# Patient Record
Sex: FEMALE | Race: BLACK OR AFRICAN AMERICAN | Employment: OTHER | URBAN - METROPOLITAN AREA
[De-identification: names, ages, dates, MRNs, and addresses within clinical notes are randomized per-mention and may not be internally consistent; named-entity substitution may affect disease eponyms.]

---

## 2017-01-08 ENCOUNTER — HOSPITAL ENCOUNTER (EMERGENCY)
Age: 72
Discharge: HOME | End: 2017-01-08
Attending: EMERGENCY MEDICINE
Payer: MEDICARE

## 2017-01-08 VITALS
WEIGHT: 209 LBS | SYSTOLIC BLOOD PRESSURE: 196 MMHG | HEART RATE: 77 BPM | DIASTOLIC BLOOD PRESSURE: 86 MMHG | BODY MASS INDEX: 32.8 KG/M2 | OXYGEN SATURATION: 100 % | HEIGHT: 67 IN | RESPIRATION RATE: 16 BRPM | TEMPERATURE: 98.7 F
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
## 2017-01-08 PROCEDURE — 99282 EMERGENCY DEPT VISIT SF MDM: CPT

## 2017-01-08 RX ORDER — TRIAMCINOLONE ACETONIDE 1 MG/ML
LOTION TOPICAL 3 TIMES DAILY
Qty: 60 ML | Refills: 0 | Status: SHIPPED | OUTPATIENT
Start: 2017-01-08 | End: 2017-01-24

## 2017-01-08 RX ORDER — PERMETHRIN 50 MG/G
CREAM TOPICAL
Qty: 60 G | Refills: 0 | Status: SHIPPED | OUTPATIENT
Start: 2017-01-08 | End: 2017-01-24 | Stop reason: ALTCHOICE

## 2017-01-08 NOTE — ED NOTES
Emergency Department Nursing Plan of Care       The Nursing Plan of Care is developed from the Nursing assessment and Emergency Department Attending provider initial evaluation. The plan of care may be reviewed in the ED Provider note. The Plan of Care was developed with the following considerations:   Patient / Family readiness to learn indicated by:verbalized understanding  Persons(s) to be included in education: patient and family  Barriers to Learning/Limitations:No    Signed     [facility]    1/8/2017   4:10 PM    Patient presents with potential insect bites to right posterior wrist and right breast since Saturday. There is redness and swelling to wrist with pain radiating up arm. Redness noted to right breast with itching. Patient states, \"it's just itching. \" States she thinks it was a spider but did not see the insect. Patient is alert and oriented x 4 and in no acute distress at this time. Respirations are at a regular rate, depth, and pattern. Patient updated on plan of care and has no questions or concerns at this time.

## 2017-01-08 NOTE — ED NOTES
Discharge instructions were given to the patient by KAVON Jefferson, Registered Nurse. .     The patient left the Emergency Department ambulatory, alert and oriented and in no acute distress with 2 prescription(s). The patient was encouraged to call or return to the ED for worsening symptoms or problems and was encouraged to schedule a follow up appointment for continuing care. Patient leaving ED accompanied by friend. The patient verbalized understanding of discharge instructions and prescriptions, all questions were answered. The patient has no further concerns at this time.

## 2017-01-08 NOTE — ED TRIAGE NOTES
Pt reports she was bit by an insect on right wrist and right breast yesterday. Right wrist is red, swollen and pruritic.   Pt BP is 225/117 in triage

## 2017-01-08 NOTE — ED PROVIDER NOTES
Patient is a 66 y.o. female presenting with Insect Bite. The history is provided by the patient. Insect Bite   This is a new (Pt reports a \"bite\" to R wrist yesterday. Denies seeing an insect. Endorses pruritus and multiple bites to R wrist and R breast. Denies fever, chills, abd pain, n/v, numbness, tingling.) problem. The current episode started yesterday. The problem occurs constantly. The problem has not changed since onset. Pertinent negatives include no chest pain, no abdominal pain, no headaches and no shortness of breath. Nothing aggravates the symptoms. Nothing relieves the symptoms. Treatments tried: Pt put alcohol on wrist. The treatment provided no relief. Past Medical History:   Diagnosis Date    Arthritis     Asthma     Essential hypertension     GERD (gastroesophageal reflux disease)     High cholesterol     HTN (hypertension)     Ill-defined condition      hyperlipidemia    Left ventricular hypertrophy due to hypertensive disease     Renal artery stenosis (HCC)        Past Surgical History:   Procedure Laterality Date    Hx gi       open inquinal hernia repair    Hx gyn       tubal ligation    Hx urological       Bilateral Renal stents. Family History:   Problem Relation Age of Onset    Cancer Mother     Heart Disease Mother     Heart Disease Father     Hypertension Father     Lung Disease Father     Hypertension Brother     Diabetes Brother     Heart Disease Brother     Kidney Disease Brother     Hypertension Brother        Social History     Social History    Marital status:      Spouse name: N/A    Number of children: N/A    Years of education: N/A     Occupational History    Not on file.      Social History Main Topics    Smoking status: Never Smoker    Smokeless tobacco: Never Used    Alcohol use 0.6 oz/week     1 Cans of beer per week      Comment: Drinks [employer] Drug use: No    Sexual activity: Yes     Partners: Male     Other Topics Concern    Not on file     Social History Narrative         ALLERGIES: Amlodipine; Clonidine; Ibuprofen; Levaquin [levofloxacin]; Lisinopril; Pcn [penicillins]; Pravastatin; and Sulfa (sulfonamide antibiotics)    Review of Systems   Constitutional: Negative. Negative for activity change, chills, fatigue and fever. HENT: Negative. Eyes: Negative. Negative for pain. Respiratory: Negative. Negative for cough and shortness of breath. Cardiovascular: Negative. Negative for chest pain. Gastrointestinal: Negative. Negative for abdominal pain, diarrhea, nausea and vomiting. Genitourinary: Negative. Negative for difficulty urinating and dysuria. Musculoskeletal: Negative. Negative for arthralgias and joint swelling. Skin: Positive for rash. Negative for color change, pallor and wound. Neurological: Negative. Negative for headaches. Psychiatric/Behavioral: Negative. Vitals:    01/08/17 1604 01/08/17 1607 01/08/17 1617   BP: (!) 225/116 (!) 228/117 196/86   Pulse: 77     Resp: 16     Temp: 98.7 °F (37.1 °C)     SpO2: 100%     Weight: 94.8 kg (209 lb)     Height: 5' 7\" (1.702 m)              Physical Exam   Constitutional: She is oriented to person, place, and time. She appears well-developed and well-nourished. No distress. HENT:   Head: Normocephalic and atraumatic. Right Ear: Hearing and external ear normal.   Left Ear: Hearing and external ear normal.   Nose: Nose normal.   Eyes: Conjunctivae and EOM are normal. Pupils are equal, round, and reactive to light. Neck: Normal range of motion. Cardiovascular: Normal rate, regular rhythm, normal heart sounds and intact distal pulses. Pulmonary/Chest: Effort normal. No respiratory distress. Musculoskeletal: Normal range of motion. She exhibits no edema, tenderness or deformity. Neurological: She is alert and oriented to person, place, and time. No cranial nerve deficit. Skin: Skin is warm and dry. Rash noted.  Rash is maculopapular. She is not diaphoretic. Multiple 1-2mm maculopapular erythematous \"bites\" to R anterior wrist and R breast above nipple. Scratch marks indicative of pruritus. Neg warmth, TTP, drainage, fluctuance, LROM. Psychiatric: She has a normal mood and affect. Her behavior is normal. Judgment and thought content normal.   Nursing note and vitals reviewed. MDM  Number of Diagnoses or Management Options  Bug bite, initial encounter:   Diagnosis management comments: DDx: scabies, bed bugs, spider bite    LABORATORY TESTS:  No results found for this or any previous visit (from the past 12 hour(s)). IMAGING RESULTS:  No orders to display    MEDICATIONS GIVEN:  Medications - No data to display    IMPRESSION:  Bug bite, initial encounter  (primary encounter diagnosis)    PLAN:  1. Current Discharge Medication List    START taking these medications    permethrin (ACTICIN) 5 % topical cream  Apply cream thoroughly over entire body from the neck down. Leave on for 12 hours overnight and repeat in 1 week. Qty: 60 g Refills: 0    triamcinolone (KENALOG) 0.1 % lotion  Apply  to affected area three (3) times daily.  use thin layer  Qty: 60 mL Refills: 0      CONTINUE these medications which have NOT CHANGED    isosorbide mononitrate ER (IMDUR) 60 mg CR tablet  TAKE 1 TABLET BY MOUTH EVERY DAY  Qty: 30 Tab Refills: 6    valsartan (DIOVAN) 320 mg tablet  TAKE 1 TABLET BY MOUTH EVERY DAY  Qty: 30 Tab Refills: 6    metoprolol succinate (TOPROL-XL) 25 mg XL tablet  TAKE 1 TABLET BY MOUTH ONCE DAILY  Qty: 30 Tab Refills: 3  Associated Diagnoses:Essential hypertension, malignant    hydrALAZINE (APRESOLINE) 50 mg tablet  TAKE 1 TABLET BY MOUTH THREE TIMES DAILY  Qty: 90 Tab Refills: 1    methyldopa (ALDOMET) 250 mg tablet  TAKE ONE-HALF TABLET BY MOUTH TWICE DAILY  Qty: 60 Tab Refills: 6    furosemide (LASIX) 40 mg tablet  TAKE 1 TABLET BY MOUTH ONCE DAILY  Qty: 30 Tab Refills: 6    potassium chloride SR (KLOR-CON 10) 10 mEq tablet  TAKE 1 TABLET BY MOUTH TWICE DAILY  Qty: 60 Tab Refills: 6    acetaminophen (TYLENOL) 500 mg tablet  Take 500 mg by mouth every six (6) hours as needed for Pain. albuterol-ipratropium (DUONEB) 2.5 mg-0.5 mg/3 ml nebulizer solution  3 mL by Nebulization route every six (6) hours as needed for Wheezing. Qty: 30 Vial Refills: 0    aspirin delayed-release 81 mg tablet  Take 81 mg by mouth daily. ranitidine (ZANTAC) 150 mg tablet  Take 150 mg by mouth two (2) times daily as needed. beclomethasone (QVAR) 80 mcg/actuation inhaler  Take 1 Puff by inhalation daily as needed. 2. Follow-up Information     Follow up With Details Comments [address] [place] [address] Drive, [address], [place] Schedule an appointment as soon as possible for a   visit in 1 week As needed, If symptoms worsen [address]  [phone]        Return to ED if worse               Patient Progress  Patient progress: stable    ED Course       Procedures      4:29 PM  I have discussed with patient their diagnosis, treatment, and follow up plan. The patient agrees to follow up as outlined in discharge paperwork and also to return to the ED with any worsening.  Amie Amezcua PA-C

## 2017-01-08 NOTE — DISCHARGE INSTRUCTIONS
Scabies: Care Instructions  Your Care Instructions  Scabies is a skin problem that can cause intense itching. It is caused by very tiny bugs called mites that dig just under the skin and lay eggs. An allergic reaction to the mites causes the itching. Scabies is usually spread by person-to-person contact. It is also possible, but not common, for scabies to spread through towels, clothes, and bedding. Everyone in your household should be treated. Scabies is treated with medicine. Itching may last for several weeks after treatment. Follow-up care is a key part of your treatment and safety. Be sure to make and go to all appointments, and call your doctor if you are having problems. It's also a good idea to know your test results and keep a list of the medicines you take. How can you care for yourself at home? · Use the lotion or cream your doctor recommends or prescribes. One treatment usually cures scabies. Do not use the cream again unless your doctor tells you to. · Wash all clothes, bedding, and towels that you used in the 3 days before you started treatment. Use hot water, and use the hot cycle in the dryer. Another option is to dry-clean these items. Or seal them in a plastic bag for 3 to 7 days. · Take an oral antihistamine, such as loratadine (Claritin) or diphenhydramine (Benadryl), to help stop itching. You also can use a nonprescription anti-itch cream. Read and follow all instructions on the label. · Do not have physical contact with other people or let anyone use your personal items until you have finished treatment. Do not use other people's personal items until your treatment is done. Tell people with whom you have close contact that they will need treatment if they have symptoms. · Take an oatmeal bath to help relieve itching. Add a handful of oatmeal (ground to a powder) to your bath. Or you can try an oatmeal bath product, such as Aveeno. When should you call for help?   Call your doctor now or seek immediate medical care if:  · You have signs of infection, such as:  ¨ Increased pain, swelling, warmth, or redness. ¨ Red streaks leading from the mite bites. ¨ Pus draining from a bite area. ¨ A fever. Watch closely for changes in your health, and be sure to contact your doctor if:  · Anyone else in your family has itching. · You do not get better within 2 weeks. Where can you learn more? Go to http://ondina-tariq.info/. Enter M741 in the search box to learn more about \"Scabies: Care Instructions. \"  Current as of: February 5, 2016  Content Version: 11.1  © 9247-4643 [employer]. Care instructions adapted under license by [employer] (which disclaims liability or warranty for this information). If you have questions about a medical condition or this instruction, always ask your healthcare professional. [address] any warranty or liability for your use of this information.

## 2017-01-09 ENCOUNTER — OFFICE VISIT (OUTPATIENT)
Dept: FAMILY MEDICINE CLINIC | Age: 72
End: 2017-01-09

## 2017-01-09 VITALS
HEIGHT: 67 IN | HEART RATE: 70 BPM | BODY MASS INDEX: 32.36 KG/M2 | TEMPERATURE: 97.5 F | SYSTOLIC BLOOD PRESSURE: 194 MMHG | OXYGEN SATURATION: 97 % | RESPIRATION RATE: 18 BRPM | WEIGHT: 206.2 LBS | DIASTOLIC BLOOD PRESSURE: 97 MMHG

## 2017-01-09 DIAGNOSIS — I10 ESSENTIAL HYPERTENSION: Primary | ICD-10-CM
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
## 2017-01-09 RX ORDER — CYCLOBENZAPRINE HCL 5 MG
TABLET ORAL
Refills: 0 | COMMUNITY
Start: 2016-11-20 | End: 2017-01-24 | Stop reason: ALTCHOICE

## 2017-01-09 RX ORDER — TRAMADOL HYDROCHLORIDE 50 MG/1
TABLET ORAL
Refills: 0 | COMMUNITY
Start: 2016-11-20 | End: 2017-01-24 | Stop reason: ALTCHOICE
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
## 2017-01-09 NOTE — MR AVS SNAPSHOT
Visit Information Date & Time Provider Department Dept. Phone Encounter #  
 1/9/2017  4:00 PM Shanique Doll MD [address] [facility] [phone] [id number] Your Appointments 1/24/2017  9:20 AM  
ESTABLISHED PATIENT with Willy Martins MD  
[address] [facility]) Appt Note: 6 MO. F/U  
 [address]  
[phone] [address] Upcoming Health Maintenance Date Due  
 GLAUCOMA SCREENING Q2Y 1/31/2009 Pneumococcal 65+ Low/Medium Risk (2 of 2 - PPSV23) 9/29/2017 MEDICARE YEARLY EXAM 9/30/2017 BREAST CANCER SCRN MAMMOGRAM 9/29/2018 COLONOSCOPY 9/14/2025 DTaP/Tdap/Td series (2 - Td) 9/29/2026 Allergies as of 1/9/2017  Review Complete On: 1/9/2017 By: Chema Bolanos LPN Severity Noted Reaction Type Reactions Amlodipine Medium 01/25/2016    Other (comments) Itching, Muscle cramps Clonidine  08/10/2015    Swelling Ibuprofen  01/11/2015    Swelling Levaquin [Levofloxacin]  08/10/2015    Unknown (comments) Lisinopril  04/09/2015    Angioedema Pcn [Penicillins]  01/11/2015    Swelling Pravastatin  08/10/2015    Myalgia Sulfa (Sulfonamide Antibiotics)  09/04/2013    Hives Current Immunizations  Reviewed on 9/29/2016 Name Date Influenza High Dose Vaccine PF 9/29/2016, 10/15/2015 Not reviewed this visit You Were Diagnosed With   
  
 Codes Comments Essential hypertension    -  Primary ICD-10-CM: I10 
ICD-9-CM: 401.9 Bug bite, subsequent encounter     ICD-10-CM: W57. [place] Harness ICD-9-CM: E906.4 Vitals BP Pulse Temp Resp Height(growth percentile) Weight(growth percentile) (!) 194/97 (BP 1 Location: Left arm, BP Patient Position: Sitting) 70 97.5 °F (36.4 °C) (Oral) 18 5' 7\" (1.702 m) 206 lb 3.2 oz (93.5 kg) SpO2 BMI OB Status Smoking Status 97% 32.3 kg/m2 Menopause Never Smoker Vitals History BMI and BSA Data Body Mass Index Body Surface Area  
 32.3 kg/m 2 2.1 m 2 Preferred Pharmacy Pharmacy Name Phone [facility], [address] [facility] [phone] Your Updated Medication List  
  
   
This list is accurate as of: 1/9/17  4:52 PM.  Always use your most recent med list.  
  
  
  
  
 acetaminophen 500 mg tablet Commonly known as:  TYLENOL Take 500 mg by mouth every six (6) hours as needed for Pain. albuterol-ipratropium 2.5 mg-0.5 mg/3 ml Nebu Commonly known as:  DUONEB  
3 mL by Nebulization route every six (6) hours as needed for Wheezing. aspirin delayed-release 81 mg tablet Take 81 mg by mouth daily. cyclobenzaprine 5 mg tablet Commonly known as:  FLEXERIL TK 1 T PO TID WC  
  
 furosemide 40 mg tablet Commonly known as:  LASIX TAKE 1 TABLET BY MOUTH ONCE DAILY  
  
 hydrALAZINE 50 mg tablet Commonly known as:  APRESOLINE  
TAKE 1 TABLET BY MOUTH THREE TIMES DAILY  
  
 isosorbide mononitrate ER 60 mg CR tablet Commonly known as:  IMDUR  
TAKE 1 TABLET BY MOUTH EVERY DAY  
  
 methyldopa 250 mg tablet Commonly known as:  ALDOMET  
TAKE ONE-HALF TABLET BY MOUTH TWICE DAILY  
  
 metoprolol succinate 25 mg XL tablet Commonly known as:  TOPROL-XL  
TAKE 1 TABLET BY MOUTH ONCE DAILY permethrin 5 % topical cream  
Commonly known as:  ACTICIN Apply cream thoroughly over entire body from the neck down. Leave on for 12 hours overnight and repeat in 1 week. potassium chloride SR 10 mEq tablet Commonly known as:  KLOR-CON 10  
TAKE 1 TABLET BY MOUTH TWICE DAILY QVAR 80 mcg/actuation inhaler Generic drug:  beclomethasone Take 1 Puff by inhalation daily as needed. raNITIdine 150 mg tablet Commonly known as:  ZANTAC Take 150 mg by mouth two (2) times daily as needed. traMADol 50 mg tablet Commonly known as:  ULTRAM  
  
 triamcinolone 0.1 % lotion Commonly known as:  KENALOG Apply  to affected area three (3) times daily. use thin layer  
  
 valsartan 320 mg tablet Commonly known as:  DIOVAN  
TAKE 1 TABLET BY MOUTH EVERY DAY Introducing [facility]! Latesha Noel introduces [employer] patient portal. Now you can access parts of your medical record, email your doctor's office, and request medication refills online. 1. In your internet browser, go to [email or web address]"[employer]. [employer]/"[employer] 2. Click on the First Time User? Click Here link in the Sign In box. You will see the New Member Sign Up page. 3. Enter your [employer] Access Code exactly as it appears below. You will not need to use this code after youve completed the sign-up process. If you do not sign up before the expiration date, you must request a new code. · [employer] Access Code: [email or web address] Expires: 4/8/2017  4:19 PM 
 
4. Enter the last four digits of your Social Security Number (xxxx) and Date of Birth (mm/dd/yyyy) as indicated and click Submit. You will be taken to the next sign-up page. 5. Create a [employer] ID. This will be your [employer] login ID and cannot be changed, so think of one that is secure and easy to remember. 6. Create a [employer] password. You can change your password at any time. 7. Enter your Password Reset Question and Answer. This can be used at a later time if you forget your password. 8. Enter your e-mail address. You will receive e-mail notification when new information is available in [address] 9. Click Sign Up. You can now view and download portions of your medical record. 10. Click the Download Summary menu link to download a portable copy of your medical information. If you have questions, please visit the Frequently Asked Questions section of the [employer] website. Remember, [employer] is NOT to be used for urgent needs. For medical emergencies, dial 911. Now available from your iPhone and Android! Please provide this summary of care documentation to your next provider. Your primary care clinician is listed as Marilyn Marks. If you have any questions after today's visit, please call [phone].

## 2017-01-13 RX ORDER — HYDRALAZINE HYDROCHLORIDE 50 MG/1
TABLET, FILM COATED ORAL
Qty: 90 TAB | Refills: 0 | Status: SHIPPED | OUTPATIENT
Start: 2017-01-13 | End: 2017-02-18 | Stop reason: SDUPTHER

## 2017-01-24 ENCOUNTER — OFFICE VISIT (OUTPATIENT)
Dept: CARDIOLOGY CLINIC | Age: 72
End: 2017-01-24

## 2017-01-24 VITALS
HEIGHT: 67 IN | BODY MASS INDEX: 32.24 KG/M2 | OXYGEN SATURATION: 100 % | DIASTOLIC BLOOD PRESSURE: 97 MMHG | SYSTOLIC BLOOD PRESSURE: 190 MMHG | WEIGHT: 205.4 LBS | HEART RATE: 59 BPM

## 2017-01-24 DIAGNOSIS — I10 ESSENTIAL HYPERTENSION, MALIGNANT: Primary | ICD-10-CM

## 2017-01-24 DIAGNOSIS — I70.1 BILATERAL RENAL ARTERY STENOSIS (HCC): ICD-10-CM

## 2017-01-24 DIAGNOSIS — I11.9 LEFT VENTRICULAR HYPERTROPHY DUE TO HYPERTENSIVE DISEASE, WITHOUT HEART FAILURE: ICD-10-CM

## 2017-01-24 RX ORDER — NIFEDIPINE 30 MG/1
30 TABLET, FILM COATED, EXTENDED RELEASE ORAL DAILY
Qty: 30 TAB | Refills: 6 | Status: SHIPPED | OUTPATIENT
Start: 2017-01-24 | End: 2017-07-25 | Stop reason: SDUPTHER

## 2017-01-24 NOTE — PROGRESS NOTES
[address] [facility]        [phone]                             NEW PATIENT HPI/FOLLOW-UP    NAME:  Charlotte Fonseca   :      MRN:   [id number]   PCP:  Heriberto Kennedy MD           Subjective: The patient is a 66y.o. year old female  who returns for a routine follow-up. Since the last visit, patient reports stopping metoprolol due to rash. BP running high. Feels contributed to by  who is invalid and difficult to manage. Has tried to find quiet time for herself. Denies change in exercise tolerance, chest pain, edema, medication intolerance, palpitations, shortness of breath, PND/orthopnea wheezing, sputum, syncope, dizziness or light headedness. Doing satisfactorily. Review of Systems  General: Pt denies excessive weight gain or loss. Pt is able to conduct ADL's. Respiratory: Denies shortness of breath, EGAN, wheezing or stridor.   Cardiovascular: Denies precordial pain, palpitations, edema or PND  Gastrointestinal: Denies poor appetite, indigestion, abdominal pain or blood in stool  Peripheral vascular: Denies claudication, leg cramps  Neuropsychiatric: Denies paresthesias,tingling,numbness,anxiety,depression,fatigue  Musculoskeletal: Denies pain,tenderness, soreness,swelling      Past Medical History   Diagnosis Date    Arthritis     Asthma     Essential hypertension     GERD (gastroesophageal reflux disease)     High cholesterol     HTN (hypertension)     Ill-defined condition      hyperlipidemia    Left ventricular hypertrophy due to hypertensive disease     Renal artery stenosis [facility])      Patient Active Problem List    Diagnosis Date Noted    History of tubal ligation 2015    Bilateral renal artery stenosis (HCC)--s/p PTA/stenting 7/10/15 [address] 2015    Stress at home 2015    Essential hypertension, malignant 2015     Class: Present on Admission    H/O gastroesophageal reflux (GERD) 2015    Obesity 09/04/2013    Edema of both legs--chronic venous insufficiency,amlodipine 09/04/2013    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 09/04/2013    High cholesterol     Left ventricular hypertrophy due to hypertensive disease       Past Surgical History   Procedure Laterality Date    Hx gi       open inquinal hernia repair    Hx gyn       tubal ligation    Hx urological       Bilateral Renal stents. Allergies   Allergen Reactions    Amlodipine Other (comments)     Itching,  Muscle cramps    Clonidine Swelling    Ibuprofen Swelling    Levaquin [Levofloxacin] Unknown (comments)    Lisinopril Angioedema    Pcn [Penicillins] Swelling    Pravastatin Myalgia    Sulfa (Sulfonamide Antibiotics) Hives      Family History   Problem Relation Age of Onset    Cancer Mother     Heart Disease Mother     Heart Disease Father     Hypertension Father     Lung Disease Father     Hypertension Brother     Diabetes Brother     Heart Disease Brother     Kidney Disease Brother     Hypertension Brother       Social History     Social History    Marital status:      Spouse name: N/A    Number of children: N/A    Years of education: N/A     Occupational History    Not on file.      Social History Main Topics    Smoking status: Never Smoker    Smokeless tobacco: Never Used    Alcohol use 0.6 oz/week     1 Cans of beer per week      Comment: Drinks [employer] Drug use: No    Sexual activity: Yes     Partners: Male     Other Topics Concern    Not on file     Social History Narrative      Current Outpatient Prescriptions   Medication Sig    hydrALAZINE (APRESOLINE) 50 mg tablet TAKE 1 TABLET BY MOUTH THREE TIMES DAILY    isosorbide mononitrate ER (IMDUR) 60 mg CR tablet TAKE 1 TABLET BY MOUTH EVERY DAY    valsartan (DIOVAN) 320 mg tablet TAKE 1 TABLET BY MOUTH EVERY DAY    metoprolol succinate (TOPROL-XL) 25 mg XL tablet TAKE 1 TABLET BY MOUTH ONCE DAILY    methyldopa (ALDOMET) 250 mg tablet TAKE ONE-HALF TABLET BY MOUTH TWICE DAILY    furosemide (LASIX) 40 mg tablet TAKE 1 TABLET BY MOUTH ONCE DAILY    potassium chloride SR (KLOR-CON 10) 10 mEq tablet TAKE 1 TABLET BY MOUTH TWICE DAILY    acetaminophen (TYLENOL) 500 mg tablet Take 500 mg by mouth every six (6) hours as needed for Pain.  albuterol-ipratropium (DUONEB) 2.5 mg-0.5 mg/3 ml nebulizer solution 3 mL by Nebulization route every six (6) hours as needed for Wheezing.  aspirin delayed-release 81 mg tablet Take 81 mg by mouth daily.  ranitidine (ZANTAC) 150 mg tablet Take 150 mg by mouth two (2) times daily as needed.  beclomethasone (QVAR) 80 mcg/actuation inhaler Take 1 Puff by inhalation daily as needed. No current facility-administered medications for this visit. I have reviewed the nurses notes, vitals, problem list, allergy list, medical history, family medical, social history and medications. Objective:     Physical Exam:     Vitals:    01/24/17 0934   BP: (!) 190/97   Pulse: (!) 59   SpO2: 100%   Weight: 205 lb 6.4 oz (93.2 kg)   Height: 5' 7\" (1.702 m)    Body mass index is 32.17 kg/(m^2). General: Well developed, in no acute distress. HEENT: No carotid bruits, no JVD, trach is midline. Heart:  Normal S1/S2 negative S3 or S4. Regular, no murmur, gallop or rub.   Respiratory: Clear bilaterally, no wheezing or rales  Abdomen:   Soft, non-tender, bowel sounds are active.   Extremities:  No edema, normal cap refill, no cyanosis. Neuro: A&Ox3, speech clear, gait stable. Skin: Skin color is normal. No rashes or lesions. No diaphoresis.   Vascular: 2+ pulses symmetric in all extremities        Data Review:       Cardiographics:    EKG: SB,minor non-specific ST-T wave changes    Cardiology Labs:    Results for orders placed or performed during the hospital encounter of 04/25/15   EKG, 12 LEAD, INITIAL   Result Value Ref Range    Ventricular Rate 84 BPM    Atrial Rate 84 BPM    P-R Interval 178 ms    QRS Duration 76 ms    Q-T Interval 358 ms    QTC Calculation (Bezet) 423 ms    Calculated P Axis 35 degrees    Calculated R Axis 7 degrees    Calculated T Axis 22 degrees    Diagnosis       Normal sinus rhythm  Nonspecific T wave abnormality  When compared with ECG of 03-FEB-2010 18:45,  No significant change  Confirmed by Sharon Arriaza ([id number]) on 4/26/2015 9:49:46 AM         Lab Results   Component Value Date/Time    Cholesterol, total 222 04/02/2015 09:06 AM    HDL Cholesterol 53 04/02/2015 09:06 AM    LDL, calculated 146 04/02/2015 09:06 AM    Triglyceride 114 04/02/2015 09:06 AM       Lab Results   Component Value Date/Time    Sodium 141 11/01/2016 11:22 AM    Potassium 4.2 11/01/2016 11:22 AM    Chloride 101 11/01/2016 11:22 AM    CO2 24 11/01/2016 11:22 AM    Anion gap 9 08/11/2015 04:37 AM    Glucose 91 11/01/2016 11:22 AM    BUN 17 11/01/2016 11:22 AM    Creatinine 0.93 11/01/2016 11:22 AM    BUN/Creatinine ratio 18 11/01/2016 11:22 AM    GFR est AA 71 11/01/2016 11:22 AM    GFR est non-AA 62 11/01/2016 11:22 AM    Calcium 9.7 11/01/2016 11:22 AM    Bilirubin, total 0.3 09/29/2016 12:10 PM    ALT 11 09/29/2016 12:10 PM    AST 17 09/29/2016 12:10 PM    Alk. phosphatase 260 09/29/2016 12:10 PM    Protein, total 7.0 09/29/2016 12:10 PM    Albumin 4.3 09/29/2016 12:10 PM    Globulin 3.8 04/25/2015 09:58 PM    A-G Ratio 1.6 09/29/2016 12:10 PM          Assessment:       ICD-10-CM ICD-9-CM    1. Essential hypertension, malignant I10 401.0 AMB POC EKG ROUTINE W/ 12 LEADS, INTER & REP   2. Left ventricular hypertrophy due to hypertensive disease, without heart failure I11.9 402.90    3. Bilateral renal artery stenosis (HCC)--s/p PTA/stenting 7/10/15 [facility] I70.1 440.1          Discussion: Patient presents at this time with uncontrolled BP in part due to stopping BB due to rash side effect and stressors within family. Will add Nifedipine XL 30 mg every day and have call in 2 weeks with response.       Plan: 1.Continue same meds. Patient stopped BB due to rash. Will start Nifedipine XL 30 mgs every day. Call with BP 2 weeks. Lipid profile and labs followed by PCP. 2.Encouraged to exercise to tolerance, lose weight and follow low fat, low cholesterol, low sodium predominantly Plant-based (consider Mediterranean) diet. Call with questions or concerns. Will follow up any test results by phone and/or f/u here in office if needed. Asa Dejesus 3.Follow up: 6 months    I have discussed the diagnosis with the patient and the intended plan as seen in the above orders. The patient has received an after-visit summary and questions were answered concerning future plans. I have discussed any concerning medication side effects and warnings with the patient as well.     Vazquez Bar MD  1/24/2017

## 2017-01-24 NOTE — MR AVS SNAPSHOT
Visit Information Date & Time Provider Department Dept. Phone Encounter #  
 1/24/2017  9:20 AM MD Marita [facility] [id number] Your Appointments 7/25/2017  9:20 AM  
ESTABLISHED PATIENT with MD Marita Castro [facility]) Appt Note: 6 Mo. F/U  
 [address]  
[phone] [address] Upcoming Health Maintenance Date Due  
 GLAUCOMA SCREENING Q2Y 1/31/2009 Pneumococcal 65+ Low/Medium Risk (2 of 2 - PPSV23) 9/29/2017 MEDICARE YEARLY EXAM 9/30/2017 BREAST CANCER SCRN MAMMOGRAM 9/29/2018 COLONOSCOPY 9/14/2025 DTaP/Tdap/Td series (2 - Td) 9/29/2026 Allergies as of 1/24/2017  Review Complete On: 1/9/2017 By: Enzo Madison LPN Severity Noted Reaction Type Reactions Amlodipine Medium 01/25/2016    Other (comments) Itching, Muscle cramps Clonidine  08/10/2015    Swelling Ibuprofen  01/11/2015    Swelling Levaquin [Levofloxacin]  08/10/2015    Unknown (comments) Lisinopril  04/09/2015    Angioedema Pcn [Penicillins]  01/11/2015    Swelling Pravastatin  08/10/2015    Myalgia Sulfa (Sulfonamide Antibiotics)  09/04/2013    Hives Current Immunizations  Reviewed on 9/29/2016 Name Date Influenza High Dose Vaccine PF 9/29/2016, 10/15/2015 Not reviewed this visit You Were Diagnosed With   
  
 Codes Comments Essential hypertension, malignant    -  Primary ICD-10-CM: I10 
ICD-9-CM: 401.0 Left ventricular hypertrophy due to hypertensive disease, without heart failure     ICD-10-CM: I11.9 ICD-9-CM: 402.90 Bilateral renal artery stenosis (HCC)     ICD-10-CM: I70.1 ICD-9-CM: 440. 1 Vitals BP Pulse Height(growth percentile) Weight(growth percentile) SpO2 BMI (!) 190/97 (!) 59 5' 7\" (1.702 m) 205 lb 6.4 oz (93.2 kg) 100% 32.17 kg/m2 OB Status Smoking Status Menopause Never Smoker Vitals History BMI and BSA Data Body Mass Index Body Surface Area  
 32.17 kg/m 2 2.1 m 2 Preferred Pharmacy Pharmacy Name Phone [address] [facility] [address] [facility] [phone] Your Updated Medication List  
  
   
This list is accurate as of: 1/24/17 10:37 AM.  Always use your most recent med list.  
  
  
  
  
 acetaminophen 500 mg tablet Commonly known as:  TYLENOL Take 500 mg by mouth every six (6) hours as needed for Pain. albuterol-ipratropium 2.5 mg-0.5 mg/3 ml Nebu Commonly known as:  DUONEB  
3 mL by Nebulization route every six (6) hours as needed for Wheezing. aspirin delayed-release 81 mg tablet Take 81 mg by mouth daily. furosemide 40 mg tablet Commonly known as:  LASIX TAKE 1 TABLET BY MOUTH ONCE DAILY  
  
 hydrALAZINE 50 mg tablet Commonly known as:  APRESOLINE  
TAKE 1 TABLET BY MOUTH THREE TIMES DAILY  
  
 isosorbide mononitrate ER 60 mg CR tablet Commonly known as:  IMDUR  
TAKE 1 TABLET BY MOUTH EVERY DAY  
  
 methyldopa 250 mg tablet Commonly known as:  ALDOMET  
TAKE ONE-HALF TABLET BY MOUTH TWICE DAILY potassium chloride SR 10 mEq tablet Commonly known as:  KLOR-CON 10  
TAKE 1 TABLET BY MOUTH TWICE DAILY QVAR 80 mcg/actuation inhaler Generic drug:  beclomethasone Take 1 Puff by inhalation daily as needed. raNITIdine 150 mg tablet Commonly known as:  ZANTAC Take 150 mg by mouth two (2) times daily as needed. valsartan 320 mg tablet Commonly known as:  DIOVAN  
TAKE 1 TABLET BY MOUTH EVERY DAY We Performed the Following AMB POC EKG ROUTINE W/ 12 LEADS, INTER & REP [87340 CPT(R)] Introducing \[facility] [place] [facility]\"" & HEALTH SERVICES! Gabrielle Fuentes introduces [employer] patient portal. Now you can access parts of your medical record, email your doctor's office, and request medication refills online. 1. In your internet browser, go to [email or web address] 2. Click on the First Time User? Click Here link in the Sign In box. You will see the New Member Sign Up page. 3. Enter your [employer] Access Code exactly as it appears below. You will not need to use this code after youve completed the sign-up process. If you do not sign up before the expiration date, you must request a new code. · [employer] Access Code: [email or web address] Expires: 4/8/2017  4:19 PM 
 
4. Enter the last four digits of your Social Security Number (xxxx) and Date of Birth (mm/dd/yyyy) as indicated and click Submit. You will be taken to the next sign-up page. 5. Create a [employer] ID. This will be your [employer] login ID and cannot be changed, so think of one that is secure and easy to remember. 6. Create a [employer] password. You can change your password at any time. 7. Enter your Password Reset Question and Answer. This can be used at a later time if you forget your password. 8. Enter your e-mail address. You will receive e-mail notification when new information is available in [address] 9. Click Sign Up. You can now view and download portions of your medical record. 10. Click the Download Summary menu link to download a portable copy of your medical information. If you have questions, please visit the Frequently Asked Questions section of the [employer] website. Remember, [employer] is NOT to be used for urgent needs. For medical emergencies, dial 911. Now available from your iPhone and Android! Please provide this summary of care documentation to your next provider. Your primary care clinician is listed as Mo Barros. If you have any questions after today's visit, please call [phone].

## 2017-02-09 RX ORDER — FUROSEMIDE 40 MG/1
TABLET ORAL
Qty: 30 TAB | Refills: 0 | Status: SHIPPED | OUTPATIENT
Start: 2017-02-09 | End: 2017-03-20 | Stop reason: SDUPTHER

## 2017-03-20 RX ORDER — FUROSEMIDE 40 MG/1
TABLET ORAL
Qty: 30 TAB | Refills: 4 | Status: SHIPPED | OUTPATIENT
Start: 2017-03-20 | End: 2017-11-14 | Stop reason: SDUPTHER

## 2017-04-17 RX ORDER — POTASSIUM CHLORIDE 750 MG/1
TABLET, FILM COATED, EXTENDED RELEASE ORAL
Qty: 60 TAB | Refills: 2 | Status: SHIPPED | OUTPATIENT
Start: 2017-04-17 | End: 2018-04-15 | Stop reason: SDUPTHER

## 2017-06-19 RX ORDER — VALSARTAN 320 MG/1
TABLET ORAL
Qty: 30 TAB | Refills: 2 | Status: SHIPPED | OUTPATIENT
Start: 2017-06-19 | End: 2017-09-06 | Stop reason: SDUPTHER

## 2017-07-13 RX ORDER — ISOSORBIDE MONONITRATE 60 MG/1
TABLET, EXTENDED RELEASE ORAL
Qty: 30 TAB | Refills: 0 | Status: SHIPPED | OUTPATIENT
Start: 2017-07-13 | End: 2017-08-14 | Stop reason: SDUPTHER

## 2017-07-25 ENCOUNTER — OFFICE VISIT (OUTPATIENT)
Dept: CARDIOLOGY CLINIC | Age: 72
End: 2017-07-25

## 2017-07-25 VITALS
DIASTOLIC BLOOD PRESSURE: 100 MMHG | TEMPERATURE: 98.2 F | RESPIRATION RATE: 15 BRPM | BODY MASS INDEX: 31.27 KG/M2 | OXYGEN SATURATION: 99 % | SYSTOLIC BLOOD PRESSURE: 160 MMHG | WEIGHT: 199.2 LBS | HEIGHT: 67 IN | HEART RATE: 67 BPM

## 2017-07-25 DIAGNOSIS — I70.1 BILATERAL RENAL ARTERY STENOSIS (HCC): ICD-10-CM

## 2017-07-25 DIAGNOSIS — R06.83 SNORES: ICD-10-CM

## 2017-07-25 DIAGNOSIS — R60.0 EDEMA OF BOTH LEGS: ICD-10-CM

## 2017-07-25 DIAGNOSIS — Z87.19 H/O GASTROESOPHAGEAL REFLUX (GERD): ICD-10-CM

## 2017-07-25 DIAGNOSIS — I10 ESSENTIAL HYPERTENSION, MALIGNANT: Primary | ICD-10-CM

## 2017-07-25 RX ORDER — NIFEDIPINE 30 MG/1
30 TABLET, FILM COATED, EXTENDED RELEASE ORAL DAILY
Qty: 30 TAB | Refills: 6 | Status: SHIPPED | OUTPATIENT
Start: 2017-07-25 | End: 2018-04-15 | Stop reason: SDUPTHER

## 2017-07-25 NOTE — MR AVS SNAPSHOT
Visit Information Date & Time Provider Department Dept. Phone Encounter #  
 7/25/2017  9:20 AM Noemi Castillo MD [facility] at [facility] 1 [id number] Upcoming Health Maintenance Date Due  
 GLAUCOMA SCREENING Q2Y 1/31/2009 INFLUENZA AGE 9 TO ADULT 8/1/2017 Pneumococcal 65+ Low/Medium Risk (2 of 2 - PPSV23) 9/29/2017 MEDICARE YEARLY EXAM 9/30/2017 BREAST CANCER SCRN MAMMOGRAM 9/29/2018 COLONOSCOPY 9/14/2025 DTaP/Tdap/Td series (2 - Td) 9/29/2026 Allergies as of 7/25/2017  Review Complete On: 7/25/2017 By: Anastasia Jones LPN Severity Noted Reaction Type Reactions Amlodipine Medium 01/25/2016    Other (comments) Itching, Muscle cramps Clonidine  08/10/2015    Swelling Ibuprofen  01/11/2015    Swelling Levaquin [Levofloxacin]  08/10/2015    Unknown (comments) Lisinopril  04/09/2015    Angioedema Pcn [Penicillins]  01/11/2015    Swelling Pravastatin  08/10/2015    Myalgia Sulfa (Sulfonamide Antibiotics)  09/04/2013    Hives Current Immunizations  Reviewed on 9/29/2016 Name Date Influenza High Dose Vaccine PF 9/29/2016, 10/15/2015 Not reviewed this visit You Were Diagnosed With   
  
 Codes Comments Essential hypertension, malignant    -  Primary ICD-10-CM: I10 
ICD-9-CM: 401.0 Edema of both legs     ICD-10-CM: R60.0 ICD-9-CM: 953. 3 Bilateral renal artery stenosis (HCC)     ICD-10-CM: I70.1 ICD-9-CM: 440.1 Snores     ICD-10-CM: R06.83 
ICD-9-CM: 786.09   
 H/O gastroesophageal reflux (GERD)     ICD-10-CM: X66.53 ICD-9-CM: V12.79 Vitals BP Pulse Temp Resp Height(growth percentile) Weight(growth percentile) (!) 160/100 (BP 1 Location: Right arm, BP Patient Position: Sitting) 67 98.2 °F (36.8 °C) (Oral) 15 5' 7\" (1.702 m) 199 lb 3.2 oz (90.4 kg) SpO2 BMI OB Status Smoking Status 99% 31.2 kg/m2 Menopause Never Smoker Vitals History BMI and BSA Data Body Mass Index Body Surface Area  
 31.2 kg/m 2 2.07 m 2 Preferred Pharmacy Pharmacy Name Phone Ricardo Hirsch [facility] [address] [facility] [phone] Your Updated Medication List  
  
   
This list is accurate as of: 7/25/17 10:52 AM.  Always use your most recent med list.  
  
  
  
  
 acetaminophen 500 mg tablet Commonly known as:  TYLENOL Take 500 mg by mouth every six (6) hours as needed for Pain. albuterol-ipratropium 2.5 mg-0.5 mg/3 ml Nebu Commonly known as:  DUONEB  
3 mL by Nebulization route every six (6) hours as needed for Wheezing. aspirin delayed-release 81 mg tablet Take 81 mg by mouth daily. furosemide 40 mg tablet Commonly known as:  LASIX TAKE 1 TABLET BY MOUTH EVERY DAY  
  
 hydrALAZINE 50 mg tablet Commonly known as:  APRESOLINE  
TAKE 1 TABLET BY MOUTH THREE TIMES DAILY  
  
 isosorbide mononitrate ER 60 mg CR tablet Commonly known as:  IMDUR  
TAKE 1 TABLET BY MOUTH EVERY DAY  
  
 methyldopa 250 mg tablet Commonly known as:  ALDOMET  
TAKE ONE-HALF TABLET BY MOUTH TWICE DAILY  
  
 NIFEdipine ER 30 mg ER tablet Commonly known as:  ADALAT CC Take 1 Tab by mouth daily. potassium chloride SR 10 mEq tablet Commonly known as:  KLOR-CON 10  
TAKE 1 TABLET BY MOUTH TWICE DAILY QVAR 80 mcg/actuation [employer] Generic drug:  beclomethasone Take 1 Puff by inhalation daily as needed. raNITIdine 150 mg tablet Commonly known as:  ZANTAC Take 150 mg by mouth two (2) times daily as needed. valsartan 320 mg tablet Commonly known as:  DIOVAN  
TAKE 1 TABLET BY MOUTH EVERY DAY We Performed the Following AMB POC EKG ROUTINE W/ 12 LEADS, INTER & REP [47602 CPT(R)] Patient Instructions 1. Dr. Bharathi Grayson [address] [place] [address], [address] [facility] [phone] For Primary Care 
 -- follow up on breathing/wheezing Low Sodium Diet (2,000 Milligram): Care Instructions Your Care Instructions Too much sodium causes your body to hold on to extra water. This can raise your blood pressure and force your heart and kidneys to work harder. In very serious cases, this could cause you to be put in the hospital. It might even be life-threatening. By limiting sodium, you will feel better and lower your risk of serious problems. The most common source of sodium is salt. People get most of the salt in their diet from canned, prepared, and packaged foods. Fast food and restaurant meals also are very high in sodium. Your doctor will probably limit your sodium to less than 2,000 milligrams (mg) a day. This limit counts all the sodium in prepared and packaged foods and any salt you add to your food. Follow-up care is a key part of your treatment and safety. Be sure to make and go to all appointments, and call your doctor if you are having problems. It's also a good idea to know your test results and keep a list of the medicines you take. How can you care for yourself at home? Read food labels · Read labels on cans and food packages. The labels tell you how much sodium is in each serving. Make sure that you look at the serving size. If you eat more than the serving size, you have eaten more sodium. · Food labels also tell you the Percent Daily Value for sodium. Choose products with low Percent Daily Values for sodium. · Be aware that sodium can come in forms other than salt, including monosodium glutamate (MSG), sodium citrate, and sodium bicarbonate (baking soda). MSG is often added to Asian food. When you eat out, you can sometimes ask for food without MSG or added salt. Buy low-sodium foods · Buy foods that are labeled \"unsalted\" (no salt added), \"sodium-free\" (less than 5 mg of sodium per serving), or \"low-sodium\" (less than 140 mg of sodium per serving). Foods labeled \"reduced-sodium\" and \"light sodium\" may still have too much sodium. Be sure to read the label to see how much sodium you are getting. · Buy fresh vegetables, or frozen vegetables without added sauces. Buy low-sodium versions of canned vegetables, soups, and other canned goods. Prepare low-sodium meals · Cut back on the amount of salt you use in cooking. This will help you adjust to the taste. Do not add salt after cooking. One teaspoon of salt has about 2,300 mg of sodium. · Take the salt shaker off the table. · Flavor your food with garlic, lemon juice, onion, vinegar, herbs, and spices. Do not use soy sauce, lite soy sauce, steak sauce, onion salt, garlic salt, celery salt, mustard, or ketchup on your food. · Use low-sodium salad dressings, sauces, and ketchup. Or make your own salad dressings and sauces without adding salt. · Use less salt (or none) when recipes call for it. You can often use half the salt a recipe calls for without losing flavor. Other foods such as rice, pasta, and grains do not need added salt. · Rinse canned vegetables, and cook them in fresh water. This removes somebut not allof the salt. · Avoid water that is naturally high in sodium or that has been treated with water softeners, which add sodium. Call your local water company to find out the sodium content of your water supply. If you buy bottled water, read the label and choose a sodium-free brand. Avoid high-sodium foods · Avoid eating: ¨ Smoked, cured, salted, and canned meat, fish, and poultry. ¨ Ham, larios, hot dogs, and luncheon meats. ¨ Regular, hard, and processed cheese and regular peanut butter. ¨ Crackers with salted tops, and other salted snack foods such as pretzels, chips, and salted popcorn. ¨ Frozen prepared meals, unless labeled low-sodium. ¨ Canned and dried soups, broths, and bouillon, unless labeled sodium-free or low-sodium. ¨ Canned vegetables, unless labeled sodium-free or low-sodium. ¨ [place] fries, pizza, tacos, and other fast foods. ¨ Pickles, olives, ketchup, and other condiments, especially soy sauce, unless labeled sodium-free or low-sodium. Where can you learn more? Go to http://ondina-tariq.info/. Enter O373 in the search box to learn more about \"Low Sodium Diet (2,000 Milligram): Care Instructions. \" Current as of: July 26, 2016 Content Version: 11.3 © 5436-2690 [employer]. Care instructions adapted under license by [employer] (which disclaims liability or warranty for this information). If you have questions about a medical condition or this instruction, always ask your healthcare professional. [address] any warranty or liability for your use of this information. Introducing [facility]! Crista Puri introduces YR Free patient portal. Now you can access parts of your medical record, email your doctor's office, and request medication refills online. 1. In your internet browser, go to [email or web address] 2. Click on the First Time User? Click Here link in the Sign In box. You will see the New Member Sign Up page. 3. Enter your YR Free Access Code exactly as it appears below. You will not need to use this code after youve completed the sign-up process. If you do not sign up before the expiration date, you must request a new code. · YR Free Access Code: [email or web address] Expires: 10/23/2017 10:47 AM 
 
4. Enter the last four digits of your Social Security Number (xxxx) and Date of Birth (mm/dd/yyyy) as indicated and click Submit. You will be taken to the next sign-up page. 5. Create a [employer] ID. This will be your YR Free login ID and cannot be changed, so think of one that is secure and easy to remember. 6. Create a [employer] password. You can change your password at any time. 7. Enter your Password Reset Question and Answer. This can be used at a later time if you forget your password. 8. Enter your e-mail address. You will receive e-mail notification when new information is available in [address] 9. Click Sign Up. You can now view and download portions of your medical record. 10. Click the Download Summary menu link to download a portable copy of your medical information. If you have questions, please visit the Frequently Asked Questions section of the [employer] website. Remember, [employer] is NOT to be used for urgent needs. For medical emergencies, dial 911. Now available from your iPhone and Android! Please provide this summary of care documentation to your next provider. Your primary care clinician is listed as Nasir Robles. If you have any questions after today's visit, please call [phone].

## 2017-07-25 NOTE — PROGRESS NOTES
[facility]   [address] [facility], [address] [place] [address]                                          NEW PATIENT HPI/FOLLOW-UP      NAME:  Naif Gallegos   :   1945   MRN:   [id number]   PCP:  Kendell Powers MD           Subjective: The patient is a 67y.o. year old female  who returns for a routine follow-up for HTN. Since the last visit, patient reports not taking her BP meds as prescribed. She stopped Methyldopa in 2017 d/t nightmares. She stopped taking Nifedipine last February because: \"The pharmacy didn't have it in stock\" -- and it was apparently a hassle to deal with them to refill it, per pt. She is taking hydralazine at most twice a day. She states it is hard to remember the afternoon dose because she is caring for her invalid . However, she states her BPs at home are \"fine\"; she does not have recordings with her today. She mostly watches her salt, though she reports binges with family BBQs, etc.     She is asking for refills on QVar and Vit D replacement because she does not have a current PCP. She has been to many providers but continues to change them. She has not seen a pulmonologist in a couple years. She received a letter from Dr. Tayo Parrish office requesting that she follow up on the bilateral renal artery stents but she seems confused about how to set up this appointment. Denies change in exercise tolerance, chest pain, edema, medication intolerance, palpitations, shortness of breath, PND/orthopnea wheezing, sputum, syncope, dizziness or light headedness. Doing satisfactorily. Review of Systems  General: Pt denies excessive weight gain or loss. Pt is able to conduct ADL's. Respiratory: Denies shortness of breath, EGAN, wheezing or stridor.   Cardiovascular: Denies precordial pain, palpitations, edema or PND  Gastrointestinal: Denies poor appetite, indigestion, abdominal pain or blood in stool  Peripheral vascular: Denies claudication, leg cramps  Neuropsychiatric: Denies paresthesias,tingling,numbness,anxiety,depression,fatigue  Musculoskeletal: Denies pain,tenderness, soreness,swelling      Past Medical History:   Diagnosis Date    Arthritis     Asthma     Essential hypertension     GERD (gastroesophageal reflux disease)     High cholesterol     HTN (hypertension)     Ill-defined condition     hyperlipidemia    Left ventricular hypertrophy due to hypertensive disease     Renal artery stenosis [facility])      Patient Active Problem List    Diagnosis Date Noted    History of tubal ligation 09/14/2015    Bilateral renal artery stenosis (HCC)--s/p PTA/stenting 7/10/15 [facility] 08/09/2015    Stress at home 06/13/2015    Essential hypertension, malignant 04/26/2015     Class: Present on Admission    H/O gastroesophageal reflux (GERD) 03/12/2015    Obesity 09/04/2013    Edema of both legs--chronic venous insufficiency,amlodipine 09/04/2013    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      Allergies   Allergen Reactions    Amlodipine Other (comments)     Itching,  Muscle cramps    Clonidine Swelling    Ibuprofen Swelling    Levaquin [Levofloxacin] Unknown (comments)    Lisinopril Angioedema    Pcn [Penicillins] Swelling    Pravastatin Myalgia    Sulfa (Sulfonamide Antibiotics) Hives      Family History   Problem Relation Age of Onset    Cancer Mother     Heart Disease Mother     Heart Disease Father     Hypertension Father     Lung Disease Father     Hypertension Brother     Diabetes Brother     Heart Disease Brother     Kidney Disease Brother     Hypertension Brother       Social History     Social History    Marital status:      Spouse name: N/A    Number of children: N/A    Years of education: N/A     Occupational History    Not on file. Social History Main Topics    Smoking status: Never Smoker    Smokeless tobacco: Never Used    Alcohol use 0.6 oz/week     1 Cans of beer per week      Comment: Drinks [employer] Drug use: No    Sexual activity: Yes     Partners: Male     Other Topics Concern    Not on file     Social History Narrative      Current Outpatient Prescriptions   Medication Sig    isosorbide mononitrate ER (IMDUR) 60 mg CR tablet TAKE 1 TABLET BY MOUTH EVERY DAY    valsartan (DIOVAN) 320 mg tablet TAKE 1 TABLET BY MOUTH EVERY DAY    potassium chloride SR (KLOR-CON 10) 10 mEq tablet TAKE 1 TABLET BY MOUTH TWICE DAILY    furosemide (LASIX) 40 mg tablet TAKE 1 TABLET BY MOUTH EVERY DAY    hydrALAZINE (APRESOLINE) 50 mg tablet TAKE 1 TABLET BY MOUTH THREE TIMES DAILY (Patient taking differently: Taking 1 a day)    acetaminophen (TYLENOL) 500 mg tablet Take 500 mg by mouth every six (6) hours as needed for Pain.  aspirin delayed-release 81 mg tablet Take 81 mg by mouth daily.  ranitidine (ZANTAC) 150 mg tablet Take 150 mg by mouth two (2) times daily as needed.  beclomethasone (QVAR) 80 mcg/actuation inhaler Take 1 Puff by inhalation daily as needed.  NIFEdipine ER (ADALAT CC) 30 mg ER tablet Take 1 Tab by mouth daily.  methyldopa (ALDOMET) 250 mg tablet TAKE ONE-HALF TABLET BY MOUTH TWICE DAILY    albuterol-ipratropium (DUONEB) 2.5 mg-0.5 mg/3 ml nebulizer solution 3 mL by Nebulization route every six (6) hours as needed for Wheezing. No current facility-administered medications for this visit. I have reviewed the nurses notes, vitals, problem list, allergy list, medical history, family medical, social history and medications.         Objective:     Physical Exam:     Vitals:    07/25/17 1000   BP: (!) 160/100   Pulse: 67   Resp: 15   Temp: 98.2 °F (36.8 °C)   TempSrc: Oral   SpO2: 99% Weight: 199 lb 3.2 oz (90.4 kg)   Height: 5' 7\" (1.702 m)    Body mass index is 31.2 kg/(m^2). General: Well developed, in no acute distress. Full affect. HEENT: No carotid bruits, no JVD, trach is midline. Heart:  Normal S1/S2 negative S3 or S4. Regular, no murmur, gallop or rub.   Respiratory: Clear bilaterally, no wheezing or rales  Abdomen:   Soft, non-tender, bowel sounds are active.   Extremities:  No edema, normal cap refill, no cyanosis. Neuro: A&Ox3, speech clear, gait stable. Skin: Skin color is normal. No rashes or lesions. No diaphoresis.   Vascular: 2+ pulses symmetric in all extremities        Data Review:       Cardiographics:    EKG: NSR though ectopic atrial rhythm not excluded,minor non-specific   ST-T wave changes     Cardiology Labs:    Results for orders placed or performed during the hospital encounter of 04/25/15   EKG, 12 LEAD, INITIAL   Result Value Ref Range    Ventricular Rate 84 BPM    Atrial Rate 84 BPM    P-R Interval 178 ms    QRS Duration 76 ms    Q-T Interval 358 ms    QTC Calculation (Bezet) 423 ms    Calculated P Axis 35 degrees    Calculated R Axis 7 degrees    Calculated T Axis 22 degrees    Diagnosis       Normal sinus rhythm  Nonspecific T wave abnormality  When compared with ECG of 03-FEB-2010 18:45,  No significant change  Confirmed by Ai Robert ([id number]) on 4/26/2015 9:49:46 AM         Lab Results   Component Value Date/Time    Cholesterol, total 222 04/02/2015 09:06 AM    HDL Cholesterol 53 04/02/2015 09:06 AM    LDL, calculated 146 04/02/2015 09:06 AM    Triglyceride 114 04/02/2015 09:06 AM       Lab Results   Component Value Date/Time    Sodium 141 11/01/2016 11:22 AM    Potassium 4.2 11/01/2016 11:22 AM    Chloride 101 11/01/2016 11:22 AM    CO2 24 11/01/2016 11:22 AM    Anion gap 9 08/11/2015 04:37 AM    Glucose 91 11/01/2016 11:22 AM    BUN 17 11/01/2016 11:22 AM    Creatinine 0.93 11/01/2016 11:22 AM    BUN/Creatinine ratio 18 11/01/2016 11:22 AM GFR est AA 71 11/01/2016 11:22 AM    GFR est non-AA 62 11/01/2016 11:22 AM    Calcium 9.7 11/01/2016 11:22 AM    Bilirubin, total 0.3 09/29/2016 12:10 PM    AST (SGOT) 17 09/29/2016 12:10 PM    Alk. phosphatase 260 09/29/2016 12:10 PM    Protein, total 7.0 09/29/2016 12:10 PM    Albumin 4.3 09/29/2016 12:10 PM    Globulin 3.8 04/25/2015 09:58 PM    A-G Ratio 1.6 09/29/2016 12:10 PM    ALT (SGPT) 11 09/29/2016 12:10 PM          Assessment:       ICD-10-CM ICD-9-CM    1. Essential hypertension, malignant I10 401.0 AMB POC EKG ROUTINE W/ 12 LEADS, INTER & REP      NIFEdipine ER (ADALAT CC) 30 mg ER tablet   2. Edema of both legs--chronic venous insufficiency,amlodipine R60.0 782.3 NIFEdipine ER (ADALAT CC) 30 mg ER tablet   3. Bilateral renal artery stenosis (HCC)--s/p PTA/stenting 7/10/15 [facility] I70.1 440.1 NIFEdipine ER (ADALAT CC) 30 mg ER tablet   4. 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 H/O gastroesophageal reflux (GERD) Z87.19 V12.79 NIFEdipine ER (ADALAT CC) 30 mg ER tablet         Discussion: Patient presents at this time stable from a cardiac perspective but with uncontrolled hypertension d/t poor medication compliance and dietary indiscretion. She is obviously overwhelmed as caregiver to her . Seems to have difficulty establishing care with PCP primarily d/t personality differences with multiple doctors. All of these factors point to poor long term outcome with her chronic diseases. Plan: 1. Stop Methyldopa. -- Pt encouraged to take hydralazine TID but if she can only managed BID that would be better than nothing   -- Nifedipine reordered    2. Encouraged to exercise to tolerance, continue to lose weight, and follow low fat, low cholesterol, low sodium! predominantly Plant-based (consider Mediterranean) diet. 3.Follow up: 2 weeks by phone; then 6 months   -- Call with questions or concerns.     -- Will follow up any test results by phone and/or f/u here in office if needed. .      I have discussed the diagnosis with the patient and the intended plan as seen in the above orders. The patient has received an after-visit summary and questions were answered concerning future plans. I have discussed any concerning medication side effects and warnings with the patient as well. Patient seen and examined. All pertinent data reviewed. I have reviewed detailed note as outlined by Marian Topete. Case discussed with Nursing/medical assistant staff and Marian Topete. Patient with poorly controlled BP due to diet and med non-compliance. Plans as outlined.       Sonia Prater PA-C  7/25/2017     PRIMO [facility]

## 2017-07-25 NOTE — PROGRESS NOTES
Chief Complaint   Patient presents with    Follow-up     6 mos     1. Have you been to the ER, urgent care clinic since your last visit? Hospitalized since your last visit? No    2. Have you seen or consulted any other health care providers outside of the [address] [place] [address] since your last visit? Include any pap smears or colon screening.  No

## 2017-07-25 NOTE — PATIENT INSTRUCTIONS
1. Dr. Laura Rivas  [address]  [phone]     For Primary Care   -- follow up on breathing/wheezing           Low Sodium Diet (2,000 Milligram): Care Instructions  Your Care Instructions  Too much sodium causes your body to hold on to extra water. This can raise your blood pressure and force your heart and kidneys to work harder. In very serious cases, this could cause you to be put in the hospital. It might even be life-threatening. By limiting sodium, you will feel better and lower your risk of serious problems. The most common source of sodium is salt. People get most of the salt in their diet from canned, prepared, and packaged foods. Fast food and restaurant meals also are very high in sodium. Your doctor will probably limit your sodium to less than 2,000 milligrams (mg) a day. This limit counts all the sodium in prepared and packaged foods and any salt you add to your food. Follow-up care is a key part of your treatment and safety. Be sure to make and go to all appointments, and call your doctor if you are having problems. It's also a good idea to know your test results and keep a list of the medicines you take. How can you care for yourself at home? Read food labels  · Read labels on cans and food packages. The labels tell you how much sodium is in each serving. Make sure that you look at the serving size. If you eat more than the serving size, you have eaten more sodium. · Food labels also tell you the Percent Daily Value for sodium. Choose products with low Percent Daily Values for sodium. · Be aware that sodium can come in forms other than salt, including monosodium glutamate (MSG), sodium citrate, and sodium bicarbonate (baking soda). MSG is often added to Asian food. When you eat out, you can sometimes ask for food without MSG or added salt.   Buy low-sodium foods  · Buy foods that are labeled \"unsalted\" (no salt added), \"sodium-free\" (less than 5 mg of sodium per serving), or \"low-sodium\" (less than 140 mg of sodium per serving). Foods labeled \"reduced-sodium\" and \"light sodium\" may still have too much sodium. Be sure to read the label to see how much sodium you are getting. · Buy fresh vegetables, or frozen vegetables without added sauces. Buy low-sodium versions of canned vegetables, soups, and other canned goods. Prepare low-sodium meals  · Cut back on the amount of salt you use in cooking. This will help you adjust to the taste. Do not add salt after cooking. One teaspoon of salt has about 2,300 mg of sodium. · Take the salt shaker off the table. · Flavor your food with garlic, lemon juice, onion, vinegar, herbs, and spices. Do not use soy sauce, lite soy sauce, steak sauce, onion salt, garlic salt, celery salt, mustard, or ketchup on your food. · Use low-sodium salad dressings, sauces, and ketchup. Or make your own salad dressings and sauces without adding salt. · Use less salt (or none) when recipes call for it. You can often use half the salt a recipe calls for without losing flavor. Other foods such as rice, pasta, and grains do not need added salt. · Rinse canned vegetables, and cook them in fresh water. This removes some--but not all--of the salt. · Avoid water that is naturally high in sodium or that has been treated with water softeners, which add sodium. Call your local water company to find out the sodium content of your water supply. If you buy bottled water, read the label and choose a sodium-free brand. Avoid high-sodium foods  · Avoid eating:  ¨ Smoked, cured, salted, and canned meat, fish, and poultry. ¨ Ham, larios, hot dogs, and luncheon meats. ¨ Regular, hard, and processed cheese and regular peanut butter. ¨ Crackers with salted tops, and other salted snack foods such as pretzels, chips, and salted popcorn. ¨ Frozen prepared meals, unless labeled low-sodium.   ¨ Canned and dried soups, broths, and bouillon, unless labeled sodium-free or low-sodium. ¨ Canned vegetables, unless labeled sodium-free or low-sodium. ¨ [place] fries, pizza, tacos, and other fast foods. ¨ Pickles, olives, ketchup, and other condiments, especially soy sauce, unless labeled sodium-free or low-sodium. Where can you learn more? Go to http://ondina-tariq.info/. Enter C436 in the search box to learn more about \"Low Sodium Diet (2,000 Milligram): Care Instructions. \"  Current as of: July 26, 2016  Content Version: 11.3  © 9841-6887 [employer]. Care instructions adapted under license by [employer] (which disclaims liability or warranty for this information). If you have questions about a medical condition or this instruction, always ask your healthcare professional. [address] any warranty or liability for your use of this information.

## 2017-08-15 RX ORDER — ISOSORBIDE MONONITRATE 60 MG/1
TABLET, EXTENDED RELEASE ORAL
Qty: 30 TAB | Refills: 6 | Status: SHIPPED | OUTPATIENT
Start: 2017-08-15 | End: 2018-03-14 | Stop reason: SDUPTHER

## 2017-09-06 RX ORDER — VALSARTAN 320 MG/1
TABLET ORAL
Qty: 30 TAB | Refills: 11 | Status: SHIPPED | OUTPATIENT
Start: 2017-09-06 | End: 2018-07-17 | Stop reason: SINTOL

## 2017-11-14 RX ORDER — FUROSEMIDE 40 MG/1
TABLET ORAL
Qty: 30 TAB | Refills: 0 | Status: SHIPPED | OUTPATIENT
Start: 2017-11-14 | End: 2017-12-14 | Stop reason: SDUPTHER

## 2017-11-30 ENCOUNTER — OFFICE VISIT (OUTPATIENT)
Dept: INTERNAL MEDICINE CLINIC | Age: 72
End: 2017-11-30

## 2017-11-30 VITALS
TEMPERATURE: 97.9 F | WEIGHT: 196 LBS | DIASTOLIC BLOOD PRESSURE: 74 MMHG | SYSTOLIC BLOOD PRESSURE: 167 MMHG | HEIGHT: 67 IN | OXYGEN SATURATION: 100 % | BODY MASS INDEX: 30.76 KG/M2 | HEART RATE: 73 BPM

## 2017-11-30 DIAGNOSIS — J45.909 MODERATE ASTHMA, UNSPECIFIED WHETHER COMPLICATED, UNSPECIFIED WHETHER PERSISTENT: ICD-10-CM

## 2017-11-30 DIAGNOSIS — Z12.39 BREAST SCREENING: ICD-10-CM

## 2017-11-30 DIAGNOSIS — E78.00 PURE HYPERCHOLESTEROLEMIA: ICD-10-CM

## 2017-11-30 DIAGNOSIS — I10 ESSENTIAL HYPERTENSION: Primary | ICD-10-CM

## 2017-11-30 DIAGNOSIS — R09.89 BRUIT OF RIGHT CAROTID ARTERY: ICD-10-CM

## 2017-11-30 DIAGNOSIS — Z12.11 COLON CANCER SCREENING: ICD-10-CM

## 2017-11-30 DIAGNOSIS — Z23 ENCOUNTER FOR IMMUNIZATION: ICD-10-CM

## 2017-11-30 NOTE — PROGRESS NOTES
HISTORY OF PRESENT ILLNESS  Vincent Hilton is a 67 y.o. female. HPI     Pt here to establish care--former PCP Dr Seema Castro. Cardiologist is Dr Dee Navarrete for HTN  BP has been difficult to control  Saw Dr Xavi Handley s/p placement b/l PTA renal artery stents--8/15  Intolerant of statins  ? If had pneumonia shots in the past  Has not had screening colonoscopy unable to complete d/t anatomy of colon per pt--will consider cologuiard  Lives with  who is on [de-identified]  Grandson live with her      Patient Active Problem List    Diagnosis Date Noted    History of tubal ligation 09/14/2015    Bilateral renal artery stenosis (HCC)--s/p PTA/stenting 7/10/15 [facility] 08/09/2015    Stress at home 06/13/2015    Essential hypertension, malignant 04/26/2015     Class: Present on Admission    H/O gastroesophageal reflux (GERD) 03/12/2015    Obesity 09/04/2013    Edema of both legs--chronic venous insufficiency,amlodipine 09/04/2013    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 1 Inhaler 11    furosemide (LASIX) 40 mg tablet TAKE 1 TABLET BY MOUTH ONCE EVERY DAY 30 Tab 0    valsartan (DIOVAN) 320 mg tablet TAKE 1 TABLET BY MOUTH EVERY DAY 30 Tab 11    isosorbide mononitrate ER (IMDUR) 60 mg CR tablet TAKE 1 TABLET BY MOUTH EVERY DAY 30 Tab 6    NIFEdipine ER (ADALAT CC) 30 mg ER tablet Take 1 Tab by mouth daily.  30 Tab 6    potassium chloride SR (KLOR-CON 10) 10 mEq tablet TAKE 1 TABLET BY MOUTH TWICE DAILY 60 Tab 2    hydrALAZINE (APRESOLINE) 50 mg tablet TAKE 1 TABLET BY MOUTH THREE TIMES DAILY (Patient taking differently: Taking 1 a day) 90 Tab 3    albuterol-ipratropium (DUONEB) 2.5 mg-0.5 mg/3 ml nebulizer solution 3 mL by Nebulization route every six (6) hours as needed for Wheezing. 30 Vial 0    aspirin delayed-release 81 mg tablet Take 81 mg by mouth daily.  ranitidine (ZANTAC) 150 mg tablet Take 150 mg by mouth two (2) times daily as needed.  acetaminophen (TYLENOL) 500 mg tablet Take 500 mg by mouth every six (6) hours as needed for Pain. Allergies   Allergen Reactions    Amlodipine Other (comments)     Itching,  Muscle cramps    Clonidine Swelling    Ibuprofen Swelling    Levaquin [Levofloxacin] Unknown (comments)    Lisinopril Angioedema    Pcn [Penicillins] Swelling    Pravastatin Myalgia    Sulfa (Sulfonamide Antibiotics) Hives      Lab Results  Component Value Date/Time   Cholesterol, total 222 04/02/2015 09:06 AM   HDL Cholesterol 53 04/02/2015 09:06 AM   LDL, calculated 146 04/02/2015 09:06 AM   Triglyceride 114 04/02/2015 09:06 AM     Lab Results  Component Value Date/Time   GFR est non-AA 62 11/01/2016 11:22 AM   GFR est AA 71 11/01/2016 11:22 AM   Creatinine 0.93 11/01/2016 11:22 AM   BUN 17 11/01/2016 11:22 AM   Sodium 141 11/01/2016 11:22 AM   Potassium 4.2 11/01/2016 11:22 AM   Chloride 101 11/01/2016 11:22 AM   CO2 24 11/01/2016 11:22 AM   Magnesium 1.7 02/07/2010 03:30 AM        Review of Systems   Constitutional: Negative for chills, fever, malaise/fatigue and weight loss. Eyes: Negative for blurred vision and double vision. Respiratory: Negative for cough and shortness of breath. Cardiovascular: Negative for chest pain and palpitations. Gastrointestinal: Negative for abdominal pain, blood in stool, constipation, diarrhea, melena, nausea and vomiting. Genitourinary: Negative for dysuria, frequency, hematuria and urgency. Musculoskeletal: Negative for back pain, falls, joint pain and myalgias. Neurological: Negative for dizziness, tremors and headaches. Physical Exam   Constitutional: She appears well-developed and well-nourished.    Appears stated age   HENT:   Mouth/Throat: Oropharynx is clear and moist. Eyes: Pupils are equal, round, and reactive to light. Neck: No JVD present. No tracheal deviation present. No thyromegaly present. right carotid bruit   Cardiovascular: Normal rate, regular rhythm and normal heart sounds. Exam reveals no gallop and no friction rub. No murmur heard. Pulmonary/Chest: Effort normal and breath sounds normal. No respiratory distress. She has no wheezes. She has no rales. She exhibits no tenderness. Abdominal: Soft. Bowel sounds are normal. She exhibits no distension and no mass. There is no tenderness. There is no rebound and no guarding. Musculoskeletal: She exhibits no edema. Lymphadenopathy:     She has no cervical adenopathy. Neurological: She is alert. No cranial nerve deficit. Skin: Skin is warm. Psychiatric: She has a normal mood and affect. Nursing note and vitals reviewed. ASSESSMENT and PLAN  Diagnoses and all orders for this visit:    1. Essential hypertension  -     METABOLIC PANEL, BASIC  -     CBC W/O DIFF  -     LIPID PANEL  -     CBC W/O DIFF  -     METABOLIC PANEL, BASIC   Mod sbp elevation--continue medicines, low sodium , fu cardiologist    2. Encounter for immunization  -     [place] IMMUNIZ ADMIN,1 SINGLE/COMB VAC/TOXOID  -     Influenza virus vaccine (FLUZONE HIGH-DOSE) 65 years and older    3. Moderate asthma, unspecified whether complicated, unspecified whether persistent   Refilled qvar, use nebs prn  4. Pure hypercholesterolemia  -     LIPID PANEL  -     LIPID PANEL  -     METABOLIC PANEL, BASIC   May need another trial of statin especially if has carotid stenosis--discussed    5. Breast screening  -     TIFFANY MAMMO BI SCREENING INCL CAD; Future    6. Colon cancer screening   cologuard ordered today  7. Bruit of right carotid artery  -     DUPLEX CAROTID BILATERAL; Future   Continue aspirin  Other orders  -     beclomethasone (QVAR) 80 mcg/actuation aero; Take 1 Puff by inhalation daily as needed.     8. Preventive   High dose flu shot and prevnar 13 administered today  Follow-up Disposition:  Return in about 4 months (around 3/30/2018) for htn hld carotid bruit wellness.

## 2017-11-30 NOTE — MR AVS SNAPSHOT
Visit Information Date & Time Provider Department Dept. Phone Encounter #  
 11/30/2017 10:00 AM Katherin Gooden, [address] [place] [address],[address] [phone] [id number] Follow-up Instructions Return in about 4 months (around 3/30/2018) for htn hld carotid bruit wellness. Your Appointments 1/24/2018  9:15 AM  
ESTABLISHED PATIENT with Ann Koengi MD  
[facility]) Appt Note: 6 MO. F/U  
 [address]  
[phone] [address] Upcoming Health Maintenance Date Due  
 GLAUCOMA SCREENING Q2Y 1/31/2009 Influenza Age 5 to Adult 8/1/2017 Pneumococcal 65+ Low/Medium Risk (2 of 2 - PPSV23) 9/29/2017 MEDICARE YEARLY EXAM 9/30/2017 BREAST CANCER SCRN MAMMOGRAM 9/29/2018 COLONOSCOPY 9/14/2025 DTaP/Tdap/Td series (2 - Td) 9/29/2026 Allergies as of 11/30/2017  Review Complete On: 11/30/2017 By: Katherin Gooden MD  
  
 Severity Noted Reaction Type Reactions Amlodipine Medium 01/25/2016    Other (comments) Itching, Muscle cramps Clonidine  08/10/2015    Swelling Ibuprofen  01/11/2015    Swelling Levaquin [Levofloxacin]  08/10/2015    Unknown (comments) Lisinopril  04/09/2015    Angioedema Pcn [Penicillins]  01/11/2015    Swelling Pravastatin  08/10/2015    Myalgia Sulfa (Sulfonamide Antibiotics)  09/04/2013    Hives Current Immunizations  Reviewed on 9/29/2016 Name Date Influenza High Dose Vaccine PF  Incomplete, 9/29/2016, 10/15/2015 Not reviewed this visit You Were Diagnosed With   
  
 Codes Comments Encounter for immunization    -  Primary ICD-10-CM: H05 ICD-9-CM: V03.89 Essential hypertension     ICD-10-CM: I10 
ICD-9-CM: 401.9 Moderate asthma, unspecified whether complicated, unspecified whether persistent     ICD-10-CM: J45.909 ICD-9-CM: 493.90   
 Pure hypercholesterolemia     ICD-10-CM: E78.00 ICD-9-CM: 272.0 Breast screening     ICD-10-CM: Z12.31 
ICD-9-CM: V76.10 Colon cancer screening     ICD-10-CM: Z12.11 ICD-9-CM: V76.51 Bruit of right carotid artery     ICD-10-CM: R09.89 ICD-9-CM: 983. 9 Vitals BP Pulse Temp Height(growth percentile) Weight(growth percentile) SpO2  
 167/74 (BP 1 Location: Left arm, BP Patient Position: Sitting) 73 97.9 °F (36.6 °C) (Oral) 5' 7\" (1.702 m) 196 lb (88.9 kg) 100% BMI OB Status Smoking Status 30.7 kg/m2 Menopause Never Smoker BMI and BSA Data Body Mass Index Body Surface Area 30.7 kg/m 2 2.05 m 2 [facility], [address] [facility] [[email or web address]] [phone] Your Updated Medication List  
  
   
This list is accurate as of: 11/30/17 11:10 AM.  Always use your most recent med list.  
  
  
  
  
 acetaminophen 500 mg tablet Commonly known as:  TYLENOL Take 500 mg by mouth every six (6) hours as needed for Pain. albuterol-ipratropium 2.5 mg-0.5 mg/3 ml Nebu Commonly known as:  DUONEB  
3 mL by Nebulization route every six (6) hours as needed for Wheezing. aspirin delayed-release 81 mg tablet Take 81 mg by mouth daily. beclomethasone 80 mcg/actuation [place] Commonly known as:  QVAR Take 1 Puff by inhalation daily as needed. furosemide 40 mg tablet Commonly known as:  LASIX TAKE 1 TABLET BY MOUTH ONCE EVERY DAY  
  
 hydrALAZINE 50 mg tablet Commonly known as:  APRESOLINE  
TAKE 1 TABLET BY MOUTH THREE TIMES DAILY  
  
 isosorbide mononitrate ER 60 mg CR tablet Commonly known as:  IMDUR  
TAKE 1 TABLET BY MOUTH EVERY DAY  
  
 NIFEdipine ER 30 mg ER tablet Commonly known as:  ADALAT CC Take 1 Tab by mouth daily. potassium chloride SR 10 mEq tablet Commonly known as:  KLOR-CON 10  
 TAKE 1 TABLET BY MOUTH TWICE DAILY  
  
 raNITIdine 150 mg tablet Commonly known as:  ZANTAC Take 150 mg by mouth two (2) times daily as needed. valsartan 320 mg tablet Commonly known as:  DIOVAN  
TAKE 1 TABLET BY MOUTH EVERY DAY Prescriptions Sent to Pharmacy Refills  
 beclomethasone (QVAR) 80 mcg/actuation aero 11 Sig: Take 1 Puff by inhalation daily as needed. Class: Normal  
 Pharmacy: [address] [place] [address] [address] AT [address] [facility] #: [phone] Route: Inhalation We Performed the Following CBC W/O DIFF [87949 CPT(R)] INFLUENZA VIRUS VACCINE, HIGH DOSE SEASONAL, PRESERVATIVE FREE [06367 CPT(R)] LIPID PANEL [42180 CPT(R)] METABOLIC PANEL, BASIC [59115 CPT(R)] [place] IMMUNIZ ADMIN,1 SINGLE/COMB VAC/TOXOID [id number] CPT(R)] Follow-up Instructions Return in about 4 months (around 3/30/2018) for htn hld carotid bruit wellness. To-Do List   
 12/04/2017 Imaging:  DUPLEX CAROTID BILATERAL   
  
 12/08/2017 Imaging:  TIFFANY MAMMO BI SCREENING INCL CAD Introducing [facility]! [employer] introduces [employer]form patient portal. Now you can access parts of your medical record, email your doctor's office, and request medication refills online. 1. In your internet browser, go to [email or web address] 2. Click on the First Time User? Click Here link in the Sign In box. You will see the New Member Sign Up page. 3. Enter your [employer]form Access Code exactly as it appears below. You will not need to use this code after youve completed the sign-up process. If you do not sign up before the expiration date, you must request a new code. · [employer]form Access Code: [email or web address] Expires: 2/28/2018 10:40 AM 
 
4. Enter the last four digits of your Social Security Number (xxxx) and Date of Birth (mm/dd/yyyy) as indicated and click Submit.  You will be taken to the next sign-up page. 5. Create a PandoDaily ID. This will be your PandoDaily login ID and cannot be changed, so think of one that is secure and easy to remember. 6. Create a PandoDaily password. You can change your password at any time. 7. Enter your Password Reset Question and Answer. This can be used at a later time if you forget your password. 8. Enter your e-mail address. You will receive e-mail notification when new information is available in [address]. 9. Click Sign Up. You can now view and download portions of your medical record. 10. Click the Download Summary menu link to download a portable copy of your medical information. If you have questions, please visit the Frequently Asked Questions section of the PandoDaily website. Remember, PandoDaily is NOT to be used for urgent needs. For medical emergencies, dial 911. Now available from your iPhone and Android! Please provide this summary of care documentation to your next provider. Your primary care clinician is listed as Siddhartha SCOTT. If you have any questions after today's visit, please call [phone].

## 2017-12-05 ENCOUNTER — HOSPITAL ENCOUNTER (OUTPATIENT)
Dept: VASCULAR SURGERY | Age: 72
Discharge: HOME | End: 2017-12-05
Attending: INTERNAL MEDICINE
Payer: MEDICARE

## 2017-12-05 ENCOUNTER — HOSPITAL ENCOUNTER (OUTPATIENT)
Dept: MAMMOGRAPHY | Age: 72
Discharge: HOME | End: 2017-12-05
Attending: INTERNAL MEDICINE
Payer: MEDICARE

## 2017-12-05 DIAGNOSIS — Z12.39 BREAST SCREENING: ICD-10-CM

## 2017-12-05 DIAGNOSIS — I65.23 BILATERAL CAROTID ARTERY STENOSIS: ICD-10-CM

## 2017-12-05 DIAGNOSIS — R09.89 BRUIT OF RIGHT CAROTID ARTERY: ICD-10-CM

## 2017-12-05 PROCEDURE — 93880 EXTRACRANIAL BILAT STUDY: CPT

## 2017-12-05 PROCEDURE — 77067 SCR MAMMO BI INCL CAD: CPT

## 2017-12-05 NOTE — PROCEDURES
[facility]  *** FINAL REPORT ***    Name: Jonn Zendejas  MRN: [id number]    Outpatient  : 1945  HIS Order #: [id number]  [address] [facility] Visit #: [id number]  Date: 05 Dec 2017    TYPE OF TEST: Cerebrovascular Duplex    REASON FOR TEST  Carotid bruit    Right Carotid:-             Proximal               Mid                 Distal  cm/s  Systolic  Diastolic  Systolic  Diastolic  Systolic  Diastolic  CCA:     52.9       8.0                            45.0       7.0  Bulb:  ECA:     50.0       3.0  ICA:     47.0      13.0       54.0      10.0       45.0      11.0  ICA/CCA:  1.0       1.9    ICA Stenosis: <50%    Right Vertebral:-  Finding: Antegrade  Sys:       28.0  Dolores:        5.0    Right Subclavian: Normal    Left Carotid:-            Proximal                Mid                 Distal  cm/s  Systolic  Diastolic  Systolic  Diastolic  Systolic  Diastolic  CCA:     20.6       8.0                            53.0      11.0  Bulb:  ECA:     44.0       0.0  ICA:     42.0      10.0       53.0      15.0       48.0      13.0  ICA/CCA:  0.8       0.9    ICA Stenosis: <50%    Left Vertebral:-  Finding: Antegrade  Sys:       42.0  Dolores:        8.0    Left Subclavian: Normal    INTERPRETATION/FINDINGS  PROCEDURE:  Carotid Duplex Examination using B-mode, color and  spectral Doppler of the extracranial cerebrovascular arteries. 1. Bilateral <50% stenosis of the internal carotid arteries. 2. No significant stenosis in the external carotid arteries  bilaterally. 3. Antegrade flow in both vertebral arteries. 4. Normal flow in both subclavian arteries. Plaque Morphology:  1. Heterogeneous plaque in the bulb and right ICA. 2. Homogeneous plaque in the bulb and left ICA. ADDITIONAL COMMENTS    I have personally reviewed the data relevant to the interpretation of  this  study. TECHNOLOGIST: Yaquelin Haro RVT  Signed: 2017 01:22 PM    PHYSICIAN: Edgardo Cross.  Stefany Bond MD  Signed: 2017 05:46 PM

## 2017-12-06 NOTE — PROGRESS NOTES
stephany zapata has mild carotid stenosis b/l/ had audible bruit but only mild dz. Take aspirin daily. Had mild HLD in 2015.    I recommend starting statin such as pravastatin 20 mg every day--call in if wiling to take take and can get repeat lipids at next visit

## 2017-12-08 RX ORDER — ROSUVASTATIN CALCIUM 5 MG/1
5 TABLET, COATED ORAL EVERY OTHER DAY
Qty: 15 TAB | Refills: 3 | Status: SHIPPED | OUTPATIENT
Start: 2017-12-08 | End: 2018-04-15 | Stop reason: SDUPTHER

## 2017-12-08 NOTE — PROGRESS NOTES
Discussed results--try crestor 5 mg every other day--escribed. Get labs in 4-6 weeks.  Pt verbalized understanding and agreement

## 2017-12-11 NOTE — PROGRESS NOTES
Spoke with patient after 2 patient identifiers being note and advised per Dr. Nehemiah Mckinney pnly has mild carotid stenosis b/l/ had audible bruit but only mild dz. Take aspirin daily. Had mild HLD in 2015. I recommend starting statin such as pravastatin 20 mg every day--call in if wiling to take take and can get repeat lipids at next visit Patient expressed understanding and has no further questions at this time.

## 2017-12-21 ENCOUNTER — TELEPHONE (OUTPATIENT)
Dept: INTERNAL MEDICINE CLINIC | Age: 72
End: 2017-12-21

## 2017-12-21 NOTE — TELEPHONE ENCOUNTER
Unable to reach patient LVM to return call. Want to tell patient pr Dr. Matt Black that colo guard test is negative and to repeat in 3 years.

## 2018-01-16 RX ORDER — HYDRALAZINE HYDROCHLORIDE 50 MG/1
TABLET, FILM COATED ORAL
Qty: 90 TAB | Refills: 3 | Status: SHIPPED | OUTPATIENT
Start: 2018-01-16 | End: 2019-02-09 | Stop reason: SDUPTHER

## 2018-01-24 ENCOUNTER — OFFICE VISIT (OUTPATIENT)
Dept: CARDIOLOGY CLINIC | Age: 73
End: 2018-01-24

## 2018-01-24 VITALS
DIASTOLIC BLOOD PRESSURE: 79 MMHG | SYSTOLIC BLOOD PRESSURE: 141 MMHG | HEART RATE: 72 BPM | WEIGHT: 194 LBS | OXYGEN SATURATION: 96 % | RESPIRATION RATE: 12 BRPM | HEIGHT: 67 IN | BODY MASS INDEX: 30.45 KG/M2

## 2018-01-24 DIAGNOSIS — E78.00 HIGH CHOLESTEROL: ICD-10-CM

## 2018-01-24 DIAGNOSIS — I70.1 BILATERAL RENAL ARTERY STENOSIS (HCC): ICD-10-CM

## 2018-01-24 DIAGNOSIS — I10 ESSENTIAL HYPERTENSION, MALIGNANT: Primary | ICD-10-CM

## 2018-01-24 DIAGNOSIS — I11.9 HYPERTENSIVE LEFT VENTRICULAR HYPERTROPHY, WITHOUT HEART FAILURE: ICD-10-CM

## 2018-01-24 DIAGNOSIS — I65.23 BILATERAL CAROTID ARTERY STENOSIS: ICD-10-CM

## 2018-01-24 NOTE — PATIENT INSTRUCTIONS
-- Very pleased with your current progress  -- Keep up the good work! -- watch your home blood pressures, call with readings over 140/90    -- Please make a 6 month follow up appointment     Heart-Healthy Diet: Care Instructions  Your Care Instructions    A heart-healthy diet has lots of vegetables, fruits, nuts, beans, and whole grains, and is low in salt. It limits foods that are high in saturated fat, such as meats, cheeses, and fried foods. It may be hard to change your diet, but even small changes can lower your risk of heart attack and heart disease. Follow-up care is a key part of your treatment and safety. Be sure to make and go to all appointments, and call your doctor if you are having problems. It's also a good idea to know your test results and keep a list of the medicines you take. How can you care for yourself at home? Watch your portions  · Learn what a serving is. A \"serving\" and a \"portion\" are not always the same thing. Make sure that you are not eating larger portions than are recommended. For example, a serving of pasta is ½ cup. A serving size of meat is 2 to 3 ounces. A 3-ounce serving is about the size of a deck of cards. Measure serving sizes until you are good at [place]" them. Keep in mind that restaurants often serve portions that are 2 or 3 times the size of one serving. · To keep your energy level up and keep you from feeling hungry, eat often but in smaller portions. · Eat only the number of calories you need to stay at a healthy weight. If you need to lose weight, eat fewer calories than your body burns (through exercise and other physical activity). Eat more fruits and vegetables  · Eat a variety of fruit and vegetables every day. Dark green, deep orange, red, or yellow fruits and vegetables are especially good for you. Examples include spinach, carrots, peaches, and berries. · Keep carrots, celery, and other veggies handy for snacks.  Buy fruit that is in season and store it where you can see it so that you will be tempted to eat it. · Cook dishes that have a lot of veggies in them, such as stir-fries and soups. Limit saturated and trans fat  · Read food labels, and try to avoid saturated and trans fats. They increase your risk of heart disease. Trans fat is found in many processed foods such as cookies and crackers. · Use olive or canola oil when you cook. Try cholesterol-lowering spreads, such as Benecol or Take Control. · Bake, broil, grill, or steam foods instead of frying them. · Choose lean meats instead of high-fat meats such as hot dogs and sausages. Cut off all visible fat when you prepare meat. · Eat fish, skinless poultry, and meat alternatives such as soy products instead of high-fat meats. Soy products, such as tofu, may be especially good for your heart. · Choose low-fat or fat-free milk and dairy products. Eat fish  · Eat at least two servings of fish a week. Certain fish, such as salmon and tuna, contain omega-3 fatty acids, which may help reduce your risk of heart attack. Eat foods high in fiber  · Eat a variety of grain products every day. Include whole-grain foods that have lots of fiber and nutrients. Examples of whole-grain foods include oats, whole wheat bread, and brown rice. · Buy whole-grain breads and cereals, instead of white bread or pastries. Limit salt and sodium  · Limit how much salt and sodium you eat to help lower your blood pressure. · Taste food before you salt it. Add only a little salt when you think you need it. With time, your taste buds will adjust to less salt. · Eat fewer snack items, fast foods, and other high-salt, processed foods. Check food labels for the amount of sodium in packaged foods. · Choose low-sodium versions of canned goods (such as soups, vegetables, and beans). Limit sugar  · Limit drinks and foods with added sugar. These include candy, desserts, and soda pop.   Limit alcohol  · Limit alcohol to no more than 2 drinks a day for men and 1 drink a day for women. Too much alcohol can cause health problems. When should you call for help? Watch closely for changes in your health, and be sure to contact your doctor if:  ? · You would like help planning heart-healthy meals. Where can you learn more? Go to http://ondina-tariq.info/. Enter V137 in the search box to learn more about \"Heart-Healthy Diet: Care Instructions. \"  Current as of: September 21, 2016  Content Version: 11.4  © 2735-3507 [employer]. Care instructions adapted under license by [employer] (which disclaims liability or warranty for this information). If you have questions about a medical condition or this instruction, always ask your healthcare professional. [address] any warranty or liability for your use of this information.

## 2018-01-24 NOTE — MR AVS SNAPSHOT
[facility] 
 
 
 [address] 
[phone] Patient: Amy Schultz MRN: [id number] DWJ:[id number] Visit Information Date & Time Provider Department Dept. Phone Encounter #  
 1/24/2018  9:15 AM Juani Whiteside MD [facility] [place] [address] [id number] Your Appointments 3/29/2018  9:15 AM  
ROUTINE CARE with Alysha Aquino, [address] [facility]) Appt Note: 4 month follow up for htn hld carotid bruit wellness [phone]  
[address] [place] [address] Upcoming Health Maintenance Date Due  
 GLAUCOMA SCREENING Q2Y 1/31/2009 MEDICARE YEARLY EXAM 9/30/2017 BREAST CANCER SCRN MAMMOGRAM 12/5/2019 DTaP/Tdap/Td series (2 - Td) 9/29/2026 Allergies as of 1/24/2018  Review Complete On: 12/5/2017 By: Viridiana President Severity Noted Reaction Type Reactions Amlodipine Medium 01/25/2016    Other (comments) Itching, Muscle cramps Clonidine  08/10/2015    Swelling Ibuprofen  01/11/2015    Swelling Levaquin [Levofloxacin]  08/10/2015    Unknown (comments) Lisinopril  04/09/2015    Angioedema Pcn [Penicillins]  01/11/2015    Swelling Pravastatin  08/10/2015    Myalgia Sulfa (Sulfonamide Antibiotics)  09/04/2013    Hives Current Immunizations  Reviewed on 9/29/2016 Name Date Influenza High Dose Vaccine PF 11/30/2017, 9/29/2016, 10/15/2015 Not reviewed this visit You Were Diagnosed With   
  
 Codes Comments Essential hypertension, malignant    -  Primary ICD-10-CM: I10 
ICD-9-CM: 401.0 High cholesterol     ICD-10-CM: E78.00 ICD-9-CM: 272.0 Bilateral carotid artery stenosis     ICD-10-CM: I65.23 ICD-9-CM: 433.10, 433.30 Bilateral renal artery stenosis (HCC)     ICD-10-CM: I70.1 ICD-9-CM: 440.1 Hypertensive left ventricular hypertrophy, without heart failure     ICD-10-CM: I11.9 ICD-9-CM: 402.90 Vitals BP Pulse Resp Height(growth percentile) Weight(growth percentile) SpO2  
 141/79 (BP 1 Location: Right arm, BP Patient Position: Sitting) 72 12 5' 7\" (1.702 m) 194 lb (88 kg) 96% BMI OB Status Smoking Status 30.38 kg/m2 Menopause Never Smoker Vitals History BMI and BSA Data Body Mass Index Body Surface Area  
 30.38 kg/m 2 2.04 m 2 Preferred Pharmacy Pharmacy Name Phone [address] [facility], [address] [phone] Your Updated Medication List  
  
   
This list is accurate as of: 1/24/18 10:38 AM.  Always use your most recent med list.  
  
  
  
  
 acetaminophen 500 mg tablet Commonly known as:  TYLENOL Take 500 mg by mouth every six (6) hours as needed for Pain. albuterol-ipratropium 2.5 mg-0.5 mg/3 ml Nebu Commonly known as:  DUONEB  
3 mL by Nebulization route every six (6) hours as needed for Wheezing. aspirin delayed-release 81 mg tablet Take 81 mg by mouth daily. beclomethasone 80 mcg/actuation [employer] Commonly known as:  QVAR Take 1 Puff by inhalation daily as needed. furosemide 40 mg tablet Commonly known as:  LASIX TAKE 1 TABLET BY MOUTH ONCE EVERY DAY  
  
 hydrALAZINE 50 mg tablet Commonly known as:  APRESOLINE  
TAKE 1 TABLET BY MOUTH THREE TIMES DAILY  
  
 isosorbide mononitrate ER 60 mg CR tablet Commonly known as:  IMDUR  
TAKE 1 TABLET BY MOUTH EVERY DAY  
  
 NIFEdipine ER 30 mg ER tablet Commonly known as:  ADALAT CC Take 1 Tab by mouth daily. potassium chloride SR 10 mEq tablet Commonly known as:  KLOR-CON 10  
TAKE 1 TABLET BY MOUTH TWICE DAILY  
  
 raNITIdine 150 mg tablet Commonly known as:  ZANTAC Take 150 mg by mouth two (2) times daily as needed. rosuvastatin 5 mg tablet Commonly known as:  CRESTOR Take 1 Tab by mouth every other day. valsartan 320 mg tablet Commonly known as:  DIOVAN  
TAKE 1 TABLET BY MOUTH EVERY DAY We Performed the Following AMB POC EKG ROUTINE W/ 12 LEADS, INTER & REP [11109 CPT(R)] Patient Instructions -- Very pleased with your current progress -- Keep up the good work! -- watch your home blood pressures, call with readings over 140/90 
 
-- Please make a 6 month follow up appointment Heart-Healthy Diet: Care Instructions Your Care Instructions A heart-healthy diet has lots of vegetables, fruits, nuts, beans, and whole grains, and is low in salt. It limits foods that are high in saturated fat, such as meats, cheeses, and fried foods. It may be hard to change your diet, but even small changes can lower your risk of heart attack and heart disease. Follow-up care is a key part of your treatment and safety. Be sure to make and go to all appointments, and call your doctor if you are having problems. It's also a good idea to know your test results and keep a list of the medicines you take. How can you care for yourself at home? Watch your portions · Learn what a serving is. A \"serving\" and a \"portion\" are not always the same thing. Make sure that you are not eating larger portions than are recommended. For example, a serving of pasta is ½ cup. A serving size of meat is 2 to 3 ounces. A 3-ounce serving is about the size of a deck of cards. Measure serving sizes until you are good at [place]" them. Keep in mind that restaurants often serve portions that are 2 or 3 times the size of one serving. · To keep your energy level up and keep you from feeling hungry, eat often but in smaller portions. · Eat only the number of calories you need to stay at a healthy weight. If you need to lose weight, eat fewer calories than your body burns (through exercise and other physical activity). Eat more fruits and vegetables · Eat a variety of fruit and vegetables every day. Dark green, deep orange, red, or yellow fruits and vegetables are especially good for you. Examples include spinach, carrots, peaches, and berries. · Keep carrots, celery, and other veggies handy for snacks. Buy fruit that is in season and store it where you can see it so that you will be tempted to eat it. · Cook dishes that have a lot of veggies in them, such as stir-fries and soups. Limit saturated and trans fat · Read food labels, and try to avoid saturated and trans fats. They increase your risk of heart disease. Trans fat is found in many processed foods such as cookies and crackers. · Use olive or canola oil when you cook. Try cholesterol-lowering spreads, such as Benecol or Take Control. · Bake, broil, grill, or steam foods instead of frying them. · Choose lean meats instead of high-fat meats such as hot dogs and sausages. Cut off all visible fat when you prepare meat. · Eat fish, skinless poultry, and meat alternatives such as soy products instead of high-fat meats. Soy products, such as tofu, may be especially good for your heart. · Choose low-fat or fat-free milk and dairy products. Eat fish · Eat at least two servings of fish a week. Certain fish, such as salmon and tuna, contain omega-3 fatty acids, which may help reduce your risk of heart attack. Eat foods high in fiber · Eat a variety of grain products every day. Include whole-grain foods that have lots of fiber and nutrients. Examples of whole-grain foods include oats, whole wheat bread, and brown rice. · Buy whole-grain breads and cereals, instead of white bread or pastries. Limit salt and sodium · Limit how much salt and sodium you eat to help lower your blood pressure. · Taste food before you salt it. Add only a little salt when you think you need it. With time, your taste buds will adjust to less salt. · Eat fewer snack items, fast foods, and other high-salt, processed foods. Check food labels for the amount of sodium in packaged foods. · Choose low-sodium versions of canned goods (such as soups, vegetables, and beans). Limit sugar · Limit drinks and foods with added sugar. These include candy, desserts, and soda pop. Limit alcohol · Limit alcohol to no more than 2 drinks a day for men and 1 drink a day for women. Too much alcohol can cause health problems. When should you call for help? Watch closely for changes in your health, and be sure to contact your doctor if: 
? · You would like help planning heart-healthy meals. Where can you learn more? Go to http://ondina-tariq.info/. Enter V137 in the search box to learn more about \"Heart-Healthy Diet: Care Instructions. \" Current as of: September 21, 2016 Content Version: 11.4 © 0424-4708 [employer]. Care instructions adapted under license by [employer] (which disclaims liability or warranty for this information). If you have questions about a medical condition or this instruction, always ask your healthcare professional. [place] [address] any warranty or liability for your use of this information. Introducing [facility]! [employer] introduces Digitwhiz patient portal. Now you can access parts of your medical record, email your doctor's office, and request medication refills online. 1. In your internet browser, go to [email or web address] 2. Click on the First Time User? Click Here link in the Sign In box. You will see the New Member Sign Up page. 3. Enter your Digitwhiz Access Code exactly as it appears below. You will not need to use this code after youve completed the sign-up process. If you do not sign up before the expiration date, you must request a new code. · Digitwhiz Access Code: [email or web address] Expires: 2/28/2018 10:40 AM 
 
 4. Enter the last four digits of your Social Security Number (xxxx) and Date of Birth (mm/dd/yyyy) as indicated and click Submit. You will be taken to the next sign-up page. 5. Create a [employer]te ID. This will be your [employer] login ID and cannot be changed, so think of one that is secure and easy to remember. 6. Create a [employer]te password. You can change your password at any time. 7. Enter your Password Reset Question and Answer. This can be used at a later time if you forget your password. 8. Enter your e-mail address. You will receive e-mail notification when new information is available in [address] 9. Click Sign Up. You can now view and download portions of your medical record. 10. Click the Download Summary menu link to download a portable copy of your medical information. If you have questions, please visit the Frequently Asked Questions section of the [employer] website. Remember, Max[employer]te is NOT to be used for urgent needs. For medical emergencies, dial 911. Now available from your iPhone and Android! Please provide this summary of care documentation to your next provider. Your primary care clinician is listed as Mando SCOTT. If you have any questions after today's visit, please call [phone].

## 2018-01-24 NOTE — PROGRESS NOTES
Chief Complaint   Patient presents with   [address]     no cardiac concerns. 1. Have you been to the ER, urgent care clinic since your last visit? Hospitalized since your last visit? No    2. Have you seen or consulted any other health care providers outside of the [address] [place] [address] since your last visit? Include any pap smears or colon screening.  No

## 2018-01-24 NOTE — PROGRESS NOTES
[facility]   [address] [facility], [address] [place] [address]                                          NEW PATIENT HPI/FOLLOW-UP      NAME:  Nlia Pace   :   1945   MRN:   [id number]   PCP:  Ritesh Taylor MD           Subjective: The patient is a 67y.o. year old female with h/o hypertensive heart disease, LE edema, bilateral carotid artery disease/stenosis, bilateral renal artery stenosis s/p stenting who returns for a routine follow-up. Since the last visit, patient reports a change in lifestyle. She is walking and doing light upper body weight training, has removed salt from her diet and is compliant on her medications. Reports she is on statin every other day for carotid artery disease. Her LE edema has improved. She feels more energetic. 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 She reports no new symptoms. Denies change in exercise tolerance, chest pain, edema, medication intolerance, palpitations, shortness of breath, PND/orthopnea, wheezing, sputum, syncope, dizziness or light headedness. Doing satisfactorily. Review of Systems  General: Pt denies excessive weight gain or loss. Pt is able to conduct ADL's. Respiratory: Denies shortness of breath, EGAN, wheezing or stridor.   Cardiovascular: Denies precordial pain, palpitations, edema or PND  Gastrointestinal: Denies poor appetite, indigestion, abdominal pain or blood in stool  Peripheral vascular: Denies claudication, leg cramps  Neuropsychiatric: Denies paresthesias,tingling,numbness,anxiety,depression,fatigue  Musculoskeletal: Denies pain,tenderness, soreness,swelling      Past Medical History:   Diagnosis Date    Arthritis     Asthma     Essential hypertension     GERD (gastroesophageal reflux disease)     High cholesterol     HTN (hypertension)     Ill-defined condition     hyperlipidemia    Left ventricular hypertrophy due to hypertensive disease     Renal artery stenosis [facility])      Patient Active Problem List    Diagnosis Date Noted    Carotid bruit present 12/05/2017    Bilateral carotid artery stenosis 12/05/2017    History of tubal ligation 09/14/2015    Bilateral renal artery stenosis (HCC)--s/p PTA/stenting 7/10/15 [facility] 08/09/2015    Stress at home 06/13/2015    Essential hypertension, malignant 04/26/2015     Class: Present on Admission    H/O gastroesophageal reflux (GERD) 03/12/2015    Obesity 09/04/2013    Edema of both legs--chronic venous insufficiency,amlodipine 09/04/2013    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 09/04/2013    High cholesterol     Left ventricular hypertrophy due to hypertensive disease       Past Surgical History:   Procedure Laterality Date    HX BREAST BIOPSY Left 20 years ago    negative    HX GI      open inquinal hernia repair    HX GYN      tubal ligation    HX UROLOGICAL      Bilateral Renal stents. Allergies   Allergen Reactions    Amlodipine Other (comments)     Itching,  Muscle cramps    Clonidine Swelling    Ibuprofen Swelling    Levaquin [Levofloxacin] Unknown (comments)    Lisinopril Angioedema    Pcn [Penicillins] Swelling    Pravastatin Myalgia    Sulfa (Sulfonamide Antibiotics) Hives      Family History   Problem Relation Age of Onset    Cancer Mother     Heart Disease Mother     Heart Disease Father     Hypertension Father     Lung Disease Father     Hypertension Brother     Diabetes Brother     Heart Disease Brother     Kidney Disease Brother     Hypertension Brother       Social History     Social History    Marital status:      Spouse name: N/A    Number of children: N/A    Years of education: N/A     Occupational History    Not on file.      Social History Main Topics    Smoking status: Never Smoker    Smokeless tobacco: Never Used    Alcohol use 0.6 oz/week     1 Cans of beer per week      Comment: Drinks [employer] Drug use: Yes     Special: Prescription, OTC    Sexual activity: Not Currently     Partners: Male     Other Topics Concern    Not on file     Social History Narrative      Current Outpatient Prescriptions   Medication Sig    hydrALAZINE (APRESOLINE) 50 mg tablet TAKE 1 TABLET BY MOUTH THREE TIMES DAILY    furosemide (LASIX) 40 mg tablet TAKE 1 TABLET BY MOUTH ONCE EVERY DAY    rosuvastatin (CRESTOR) 5 mg tablet Take 1 Tab by mouth every other day.  beclomethasone (QVAR) 80 mcg/actuation aero Take 1 Puff by inhalation daily as needed.  valsartan (DIOVAN) 320 mg tablet TAKE 1 TABLET BY MOUTH EVERY DAY    isosorbide mononitrate ER (IMDUR) 60 mg CR tablet TAKE 1 TABLET BY MOUTH EVERY DAY    NIFEdipine ER (ADALAT CC) 30 mg ER tablet Take 1 Tab by mouth daily.  potassium chloride SR (KLOR-CON 10) 10 mEq tablet TAKE 1 TABLET BY MOUTH TWICE DAILY    acetaminophen (TYLENOL) 500 mg tablet Take 500 mg by mouth every six (6) hours as needed for Pain.  aspirin delayed-release 81 mg tablet Take 81 mg by mouth daily.  ranitidine (ZANTAC) 150 mg tablet Take 150 mg by mouth two (2) times daily as needed.  albuterol-ipratropium (DUONEB) 2.5 mg-0.5 mg/3 ml nebulizer solution 3 mL by Nebulization route every six (6) hours as needed for Wheezing. No current facility-administered medications for this visit. I have reviewed the nurses notes, vitals, problem list, allergy list, medical history, family medical, social history and medications. Objective:     Physical Exam:     Vitals:    01/24/18 0947 01/24/18 0951   BP: (!) 179/95 141/79   Pulse: 72    Resp: 12    SpO2: 96%    Weight: 194 lb (88 kg)    Height: 5' 7\" (1.702 m)     Body mass index is 30.38 kg/(m^2). General: WDWN adult female, in no acute distress. Pleasant affect. HEENT: No carotid bruits, no JVD, trach is midline. Heart:  Normal S1/S2 negative S3 or S4.  Regular, no murmur, gallop or rub.   Respiratory: Clear bilaterally, no wheezing or rales  Abdomen:   Soft, non-tender, bowel sounds are active.   Extremities:  No edema, normal cap refill, no cyanosis. Neuro: A&Ox3, speech clear, gait stable. Skin: Skin color is normal. No rashes or lesions. No diaphoresis. Vascular: 2+ pulses symmetric in all extremities        Data Review:       Cardiographics:    EKG interpretation:  Rhythm: normal sinus rhythm; and regular . Rate (approx.): 63; Axis: normal; P wave: abnormal P axis in inferior leads; QRS interval: normal ; ST/T wave: diffuse non-specific changes; This EKG was interpreted by Kesha Resendiz PA-C.     Cardiology Labs:    Results for orders placed or performed during the hospital encounter of 04/25/15   EKG, 12 LEAD, INITIAL   Result Value Ref Range    Ventricular Rate 84 BPM    Atrial Rate 84 BPM    P-R Interval 178 ms    QRS Duration 76 ms    Q-T Interval 358 ms    QTC Calculation (Bezet) 423 ms    Calculated P Axis 35 degrees    Calculated R Axis 7 degrees    Calculated T Axis 22 degrees    Diagnosis       Normal sinus rhythm  Nonspecific T wave abnormality  When compared with ECG of 03-FEB-2010 18:45,  No significant change  Confirmed by Isma Edwards ([id number]) on 4/26/2015 9:49:46 AM         Lab Results   Component Value Date/Time    Cholesterol, total 222 04/02/2015 09:06 AM    HDL Cholesterol 53 04/02/2015 09:06 AM    LDL, calculated 146 04/02/2015 09:06 AM    Triglyceride 114 04/02/2015 09:06 AM       Lab Results   Component Value Date/Time    Sodium 141 11/01/2016 11:22 AM    Potassium 4.2 11/01/2016 11:22 AM    Chloride 101 11/01/2016 11:22 AM    CO2 24 11/01/2016 11:22 AM    Anion gap 9 08/11/2015 04:37 AM    Glucose 91 11/01/2016 11:22 AM    BUN 17 11/01/2016 11:22 AM    Creatinine 0.93 11/01/2016 11:22 AM    BUN/Creatinine ratio 18 11/01/2016 11:22 AM    GFR est AA 71 11/01/2016 11:22 AM    GFR est non-AA 62 11/01/2016 11:22 AM    Calcium 9.7 11/01/2016 11:22 AM    Bilirubin, total 0.3 09/29/2016 12:10 PM    AST (SGOT) 17 2016 12:10 PM    Alk. phosphatase 260 2016 12:10 PM    Protein, total 7.0 2016 12:10 PM    Albumin 4.3 2016 12:10 PM    Globulin 3.8 2015 09:58 PM    A-G Ratio 1.6 2016 12:10 PM    ALT (SGPT) 11 2016 12:10 PM          Assessment:       ICD-10-CM ICD-9-CM    1. Essential hypertension, malignant I10 401.0    2. High cholesterol E78.00 272.0 AMB POC EKG ROUTINE W/ 12 LEADS, INTER & REP   3. Bilateral carotid artery stenosis I65.23 433.10      433.30    4. Bilateral renal [facility]    5. Hypertensive left ventricular hypertrophy, without heart failure I11.9 402.90          Discussion: Patient presents at this time stable from a cardiac perspective. BP was well controlled. Pt has made a great effort in adhering to lifestyle changes and medication compliance. Very pleased with present status. Discussed/seen with Dr. Josee Graves: 1. Continue same meds. -- Needs to get labs drawn   -- Call with BP > 140/90    2. Lipid profile and labs followed by PCP    2. Encouraged to exercise to tolerance, lose weight, and follow low fat, low cholesterol, low sodium predominantly Plant-based (consider Mediterranean) diet. 3.Follow up: 6 months   --  Call with questions or concerns. -- Will follow up any test results by phone and/or f/u here in office if needed. .        I have discussed the diagnosis with the patient and the intended plan as seen in the above orders. The patient has received an after-visit summary and questions were answered concerning future plans. I have discussed any concerning medication side effects and warnings with the patient as well. Patient seen and examined. All pertinent data reviewed. I have reviewed detailed note as outlined by Kaley Flores. Case discussed with Nursing/medical assistant staff and Kaley Flores. Patient presents cardiac stable.  BP at home and as outpatient at goal <140/90 due to lifestyle changes. Plans as outlined.       Jazmin Lemus PA-C  1/24/2018     ARCHER Severo Spry

## 2018-02-16 ENCOUNTER — HOSPITAL ENCOUNTER (OUTPATIENT)
Dept: LAB | Age: 73
Discharge: HOME | End: 2018-02-16
Payer: MEDICARE

## 2018-02-16 PROCEDURE — 85027 COMPLETE CBC AUTOMATED: CPT

## 2018-02-16 PROCEDURE — 80048 BASIC METABOLIC PNL TOTAL CA: CPT

## 2018-02-16 PROCEDURE — 80061 LIPID PANEL: CPT

## 2018-02-16 PROCEDURE — 36415 COLL VENOUS BLD VENIPUNCTURE: CPT

## 2018-02-17 LAB
BUN SERPL-MCNC: 24 MG/DL (ref 8–27)
BUN/CREAT SERPL: 21 (ref 12–28)
CALCIUM SERPL-MCNC: 9.5 MG/DL (ref 8.7–10.3)
CHLORIDE SERPL-SCNC: 101 MMOL/L (ref 96–106)
CHOLEST SERPL-MCNC: 185 MG/DL (ref 100–199)
CO2 SERPL-SCNC: 24 MMOL/L (ref 18–29)
CREAT SERPL-MCNC: 1.15 MG/DL (ref 0.57–1)
ERYTHROCYTE [DISTWIDTH] IN BLOOD BY AUTOMATED COUNT: 13 % (ref 12.3–15.4)
GFR SERPLBLD CREATININE-BSD FMLA CKD-EPI: 47 ML/MIN/1.73
GFR SERPLBLD CREATININE-BSD FMLA CKD-EPI: 54 ML/MIN/1.73
GLUCOSE SERPL-MCNC: 103 MG/DL (ref 65–99)
HCT VFR BLD AUTO: 36.1 % (ref 34–46.6)
HDLC SERPL-MCNC: 68 MG/DL
HGB BLD-MCNC: 11.8 G/DL (ref 11.1–15.9)
LDLC SERPL CALC-MCNC: 101 MG/DL (ref 0–99)
MCH RBC QN AUTO: 30.5 PG (ref 26.6–33)
MCHC RBC AUTO-ENTMCNC: 32.7 G/DL (ref 31.5–35.7)
MCV RBC AUTO: 93 FL (ref 79–97)
PLATELET # BLD AUTO: 301 X10E3/UL (ref 150–379)
POTASSIUM SERPL-SCNC: 4.6 MMOL/L (ref 3.5–5.2)
RBC # BLD AUTO: 3.87 X10E6/UL (ref 3.77–5.28)
SODIUM SERPL-SCNC: 143 MMOL/L (ref 134–144)
TRIGL SERPL-MCNC: 79 MG/DL (ref 0–149)
VLDLC SERPL CALC-MCNC: 16 MG/DL (ref 5–40)
WBC # BLD AUTO: 4.3 X10E3/UL (ref 3.4–10.8)

## 2018-02-17 NOTE — PROGRESS NOTES
Discussed labs--continue crestor every other day-tolerating.   Pt inquiring about cologuard results from a few weeks ago---will ask staff to get the cologuard results and let me know

## 2018-02-19 NOTE — PROGRESS NOTES
Spoke with brandi and they advised the test was negative. Spoke with patient after 2 patient identifiers being note and advised per Dr. Gomez Langley that cologuanicholas test was negative. Patient expressed understanding and has no further questions at this time.

## 2018-03-14 RX ORDER — ISOSORBIDE MONONITRATE 60 MG/1
TABLET, EXTENDED RELEASE ORAL
Qty: 30 TAB | Refills: 6 | Status: SHIPPED | OUTPATIENT
Start: 2018-03-14 | End: 2018-12-11

## 2018-03-29 ENCOUNTER — OFFICE VISIT (OUTPATIENT)
Dept: INTERNAL MEDICINE CLINIC | Age: 73
End: 2018-03-29

## 2018-03-29 VITALS
TEMPERATURE: 97.8 F | HEIGHT: 67 IN | HEART RATE: 63 BPM | BODY MASS INDEX: 30.13 KG/M2 | SYSTOLIC BLOOD PRESSURE: 180 MMHG | OXYGEN SATURATION: 99 % | DIASTOLIC BLOOD PRESSURE: 72 MMHG | WEIGHT: 192 LBS

## 2018-03-29 DIAGNOSIS — E78.00 HIGH CHOLESTEROL: ICD-10-CM

## 2018-03-29 DIAGNOSIS — I65.23 BILATERAL CAROTID ARTERY STENOSIS: ICD-10-CM

## 2018-03-29 DIAGNOSIS — Z00.00 MEDICARE ANNUAL WELLNESS VISIT, SUBSEQUENT: ICD-10-CM

## 2018-03-29 DIAGNOSIS — I10 ESSENTIAL HYPERTENSION, MALIGNANT: Primary | ICD-10-CM

## 2018-03-29 DIAGNOSIS — Z13.5 GLAUCOMA SCREENING: ICD-10-CM

## 2018-03-29 NOTE — PATIENT INSTRUCTIONS

## 2018-03-29 NOTE — MR AVS SNAPSHOT
[address] [facility] 
[phone] Patient: Kevin Parks MRN: [id number] [id number] Visit Information Date & Time Provider Department Dept. Phone Encounter #  
 3/29/2018  9:15 AM Samantha Canales, [address] [phone] [id number] Follow-up Instructions Return in about 6 months (around 9/29/2018) for htn. Your Appointments 7/25/2018 10:00 AM  
ESTABLISHED PATIENT with Nandini Ashley MD  
[facility] [place]) Appt Note: 6 MO. F/U  
 [address]  
[phone] [address] [facility] Upcoming Health Maintenance Date Due  
 GLAUCOMA SCREENING Q2Y 1/31/2009 MEDICARE YEARLY EXAM 3/14/2018 BREAST CANCER SCRN MAMMOGRAM 12/5/2019 DTaP/Tdap/Td series (2 - Td) 9/29/2026 Allergies as of 3/29/2018  Review Complete On: 1/24/2018 By: Nandini Ashley MD  
  
 Severity Noted Reaction Type Reactions Amlodipine Medium 01/25/2016    Other (comments) Itching, Muscle cramps Clonidine  08/10/2015    Swelling Ibuprofen  01/11/2015    Swelling Levaquin [Levofloxacin]  08/10/2015    Unknown (comments) Lisinopril  04/09/2015    Angioedema Pcn [Penicillins]  01/11/2015    Swelling Pravastatin  08/10/2015    Myalgia Sulfa (Sulfonamide Antibiotics)  09/04/2013    Hives Current Immunizations  Reviewed on 9/29/2016 Name Date Influenza High Dose Vaccine PF 11/30/2017, 9/29/2016, 10/15/2015 Not reviewed this visit You Were Diagnosed With   
  
 Codes Comments Essential hypertension, malignant    -  Primary ICD-10-CM: I10 
ICD-9-CM: 401.0 Bilateral carotid artery stenosis     ICD-10-CM: I65.23 ICD-9-CM: 433.10, 433.30 High cholesterol     ICD-10-CM: E78.00 ICD-9-CM: 272.0 Glaucoma screening     ICD-10-CM: Z13.5 ICD-9-CM: V80.1 Vitals BP Pulse Temp Height(growth percentile) Weight(growth percentile) SpO2  
 180/72 (BP 1 Location: Left arm, BP Patient Position: Sitting) 63 97.8 °F (36.6 °C) (Oral) 5' 7\" (1.702 m) 192 lb (87.1 kg) 99% BMI OB Status Smoking Status 30.07 kg/m2 Menopause Never Smoker BMI and BSA Data Body Mass Index Body Surface Area 30.07 kg/m 2 2.03 m 2 Preferred Pharmacy Pharmacy Name Phone [address] [facility], [address] [phone] Your Updated Medication List  
  
   
This list is accurate as of 3/29/18  9:59 AM.  Always use your most recent med list.  
  
  
  
  
 acetaminophen 500 mg tablet Commonly known as:  TYLENOL Take 500 mg by mouth every six (6) hours as needed for Pain. albuterol-ipratropium 2.5 mg-0.5 mg/3 ml Nebu Commonly known as:  DUONEB  
3 mL by Nebulization route every six (6) hours as needed for Wheezing. aspirin delayed-release 81 mg tablet Take 81 mg by mouth daily. beclomethasone 80 mcg/actuation [employer] Commonly known as:  QVAR Take 1 Puff by inhalation daily as needed. furosemide 40 mg tablet Commonly known as:  LASIX TAKE 1 TABLET BY MOUTH ONCE EVERY DAY  
  
 hydrALAZINE 50 mg tablet Commonly known as:  APRESOLINE  
TAKE 1 TABLET BY MOUTH THREE TIMES DAILY  
  
 isosorbide mononitrate ER 60 mg CR tablet Commonly known as:  IMDUR  
TAKE 1 TABLET BY MOUTH EVERY DAY  
  
 NIFEdipine ER 30 mg ER tablet Commonly known as:  ADALAT CC Take 1 Tab by mouth daily. potassium chloride SR 10 mEq tablet Commonly known as:  KLOR-CON 10  
TAKE 1 TABLET BY MOUTH TWICE DAILY  
  
 raNITIdine 150 mg tablet Commonly known as:  ZANTAC Take 150 mg by mouth two (2) times daily as needed. rosuvastatin 5 mg tablet Commonly known as:  CRESTOR  
 Take 1 Tab by mouth every other day. valsartan 320 mg tablet Commonly known as:  DIOVAN  
TAKE 1 TABLET BY MOUTH EVERY DAY We Performed the Following REFERRAL TO OPHTHALMOLOGY [REF57 Custom] Follow-up Instructions Return in about 6 months (around 9/29/2018) for htn. Referral Information Referral ID Referred By Referred To  
  
 [id number] Keiko Cole [facility] [address] [place] Sh[address] Brice [address] [place], [address] [facility] Visits Status Start Date End Date 1 New Request 3/29/18 3/29/19 If your referral has a status of pending review or denied, additional information will be sent to support the outcome of this decision. Patient Instructions Medicare Wellness Visit, Female The best way to live healthy is to have a healthy lifestyle by eating a well-balanced diet, exercising regularly, limiting alcohol and stopping smoking. Regular physical exams and screening tests are another way to keep healthy. Preventive exams provided by your health care provider can find health problems before they become diseases or illnesses. Preventive services including immunizations, screening tests, monitoring and exams can help you take care of your own health. All people over age 72 should have a pneumovax  and and a prevnar shot to prevent pneumonia. These are once in a lifetime unless you and your provider decide differently. All people over 65 should have a yearly flu shot and a tetanus vaccine every 10 years. A bone mass density to screen for osteoporosis or thinning of the bones should be done every 2 years after 65. Screening for diabetes mellitus with a blood sugar test should be done every year.  
 
Glaucoma is a disease of the eye due to increased ocular pressure that can lead to blindness and it should be done every year by an eye professional. 
 
Cardiovascular screening tests that check for elevated lipids (fatty part of blood) which can lead to heart disease and strokes should be done every 5 years. Colorectal screening that evaluates for blood or polyps in your colon should be done yearly as a stool test or every five years as a flexible sigmoidoscope or every 10 years as a colonoscopy up to age 76. Breast cancer screening with a mammogram is recommended biennially  for women age 54-69. Screening for cervical cancer with a pap smear and pelvic exam is recommended for women after age 72 years every 2 years up to age 79 or when the provider and patient decide to stop. If there is a history of cervical abnormalities or other increased risk for cancer then the test is recommended yearly. Hepatitis C screening is also recommended for anyone born between [birth year] through [address]. A shingles vaccine is also recommended once in a lifetime after age 61. Your Medicare Wellness Exam is recommended annually. Here is a list of your current Health Maintenance items with a due date: 
Health Maintenance Due Topic Date Due  Glaucoma Screening   01/31/2009 Camila Esquivel Annual Well Visit  03/14/2018 Introducing [facility]! [facility] introduces [employer] patient portal. Now you can access parts of your medical record, email your doctor's office, and request medication refills online. 1. In your internet browser, go to [email or web address] 2. Click on the First Time User? Click Here link in the Sign In box. You will see the New Member Sign Up page. 3. Enter your [employer] Access Code exactly as it appears below. You will not need to use this code after youve completed the sign-up process. If you do not sign up before the expiration date, you must request a new code. · [employer] Access Code: [email or web address] Expires: 6/27/2018  9:55 AM 
 
4. Enter the last four digits of your Social Security Number (xxxx) and Date of Birth (mm/dd/yyyy) as indicated and click Submit.  You will be taken to the next sign-up page. 5. Create a [employer] ID. This will be your [employer] login ID and cannot be changed, so think of one that is secure and easy to remember. 6. Create a [employer] password. You can change your password at any time. 7. Enter your Password Reset Question and Answer. This can be used at a later time if you forget your password. 8. Enter your e-mail address. You will receive e-mail notification when new information is available in [address] 9. Click Sign Up. You can now view and download portions of your medical record. 10. Click the Download Summary menu link to download a portable copy of your medical information. If you have questions, please visit the Frequently Asked Questions section of the [employer] website. Remember, [employer] is NOT to be used for urgent needs. For medical emergencies, dial 911. Now available from your iPhone and Android! Please provide this summary of care documentation to your next provider. Your primary care clinician is listed as Rollene Leyden LEE. If you have any questions after today's visit, please call [phone].

## 2018-03-29 NOTE — PROGRESS NOTES
Chief Complaint   Patient presents with   [facility] Annual Wellness Visit     yearly    Hypertension     4 month follow up    Cholesterol Problem     4 month follow up    Labs     fasting     Health Maintenance   Topic Date Due    GLAUCOMA SCREENING Q2Y  01/31/2009    MEDICARE YEARLY EXAM  03/14/2018    BREAST CANCER SCRN MAMMOGRAM  12/05/2019    DTaP/Tdap/Td series (2 - Td) 09/29/2026    Bone Densitometry (Dexa) Screening  Completed    ZOSTER VACCINE AGE 60>  Addressed    Pneumococcal 65+ Low/Medium Risk  Addressed    Influenza Age 5 to Adult  Completed     Health Maintenance Review

## 2018-03-29 NOTE — PROGRESS NOTES
HISTORY OF PRESENT ILLNESS  Matt Mancilla is a 75 y.o. female. HPI    f/u htn hld carotid artery stenosis-mild   Now on crestor 5mg every other day, tolerating- last month  Taking hydralazine only 1 every day  bp was6/76 today at home per pt  cologuard was negative    Last visit:  Pt here to establish care--former PCP Dr Doreen Anguiano. Cardiologist is Dr Garcia for HTN  BP has been difficult to control  Saw Dr Yuliya Cooper s/p placement b/l PTA renal artery stents--8/15  Intolerant of statins  ? If had pneumonia shots in the past  Has not had screening colonoscopy unable to complete d/t anatomy of colon per pt--will consider cologuiard  Lives with  who is on [de-identified]  Grandson live with her       Patient Active Problem List    Diagnosis Date Noted    Carotid bruit present 12/05/2017    Bilateral carotid artery stenosis 12/05/2017    History of tubal ligation 09/14/2015    Bilateral renal artery stenosis (HCC)--s/p PTA/stenting 7/10/15 [facility] 08/09/2015    Stress at home 06/13/2015    Essential hypertension, malignant 04/26/2015     Class: Present on Admission    H/O gastroesophageal reflux (GERD) 03/12/2015    Obesity 09/04/2013    Edema of both legs--chronic venous insufficiency,amlodipine 09/04/2013    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 09/04/2013    High cholesterol     Left ventricular hypertrophy due to hypertensive disease      Current Outpatient Prescriptions   Medication Sig Dispense Refill    isosorbide mononitrate ER (IMDUR) 60 mg CR tablet TAKE 1 TABLET BY MOUTH EVERY DAY 30 Tab 6    hydrALAZINE (APRESOLINE) 50 mg tablet TAKE 1 TABLET BY MOUTH THREE TIMES DAILY 90 Tab 3    furosemide (LASIX) 40 mg tablet TAKE 1 TABLET BY MOUTH ONCE EVERY DAY 30 Tab 6    rosuvastatin (CRESTOR) 5 mg tablet Take 1 Tab by mouth every other day. 15 Tab 3    beclomethasone (QVAR) 80 mcg/actuation aero Take 1 Puff by inhalation daily as needed.  1 Inhaler 11    valsartan (DIOVAN) 320 mg tablet TAKE 1 TABLET BY MOUTH EVERY DAY 30 Tab 11    NIFEdipine ER (ADALAT CC) 30 mg ER tablet Take 1 Tab by mouth daily. 30 Tab 6    potassium chloride SR (KLOR-CON 10) 10 mEq tablet TAKE 1 TABLET BY MOUTH TWICE DAILY 60 Tab 2    acetaminophen (TYLENOL) 500 mg tablet Take 500 mg by mouth every six (6) hours as needed for Pain.  albuterol-ipratropium (DUONEB) 2.5 mg-0.5 mg/3 ml nebulizer solution 3 mL by Nebulization route every six (6) hours as needed for Wheezing. 30 Vial 0    aspirin delayed-release 81 mg tablet Take 81 mg by mouth daily.  ranitidine (ZANTAC) 150 mg tablet Take 150 mg by mouth two (2) times daily as needed. Allergies   Allergen Reactions    Amlodipine Other (comments)     Itching,  Muscle cramps    Clonidine Swelling    Ibuprofen Swelling    Levaquin [Levofloxacin] Unknown (comments)    Lisinopril Angioedema    Pcn [Penicillins] Swelling    Pravastatin Myalgia    Sulfa (Sulfonamide Antibiotics) Hives      Lab Results  Component Value Date/Time   Glucose 103 (H) 02/16/2018 09:29 AM   Glucose (POC) 96 04/27/2015 09:57 PM   Microalb/Creat ratio (ug/mg creat.) 62.5 (H) 04/02/2015 09:06 AM   LDL, calculated 101 (H) 02/16/2018 09:29 AM   Creatinine 1.15 (H) 02/16/2018 09:29 AM      Lab Results  Component Value Date/Time   Cholesterol, total 185 02/16/2018 09:29 AM   HDL Cholesterol 68 02/16/2018 09:29 AM   LDL, calculated 101 (H) 02/16/2018 09:29 AM   Triglyceride 79 02/16/2018 09:29 AM     Lab Results  Component Value Date/Time   GFR est non-AA 47 (L) 02/16/2018 09:29 AM   GFR est AA 54 (L) 02/16/2018 09:29 AM   Creatinine 1.15 (H) 02/16/2018 09:29 AM   BUN 24 02/16/2018 09:29 AM   Sodium 143 02/16/2018 09:29 AM   Potassium 4.6 02/16/2018 09:29 AM   Chloride 101 02/16/2018 09:29 AM   CO2 24 02/16/2018 09:29 AM   Magnesium 1.7 02/07/2010 03:30 AM        ROS    Physical Exam   Constitutional: She appears well-developed and well-nourished.    Appears stated age Cardiovascular: Normal rate, regular rhythm and normal heart sounds. Exam reveals no gallop and no friction rub. No murmur heard. Pulmonary/Chest: Effort normal and breath sounds normal. No respiratory distress. She has no wheezes. Abdominal: Soft. Bowel sounds are normal.   Musculoskeletal: She exhibits no edema. Neurological: She is alert. Psychiatric: She has a normal mood and affect. Nursing note and vitals reviewed. ASSESSMENT and PLAN  Diagnoses and all orders for this visit:    1. Essential hypertension, malignant    2. Bilateral carotid artery stenosis    3. High cholesterol    4. Glaucoma screening  -     REFERRAL TO OPHTHALMOLOGY    5. Medicare annual wellness visit, subsequent      Follow-up Disposition:  Return in about 6 months (around 9/29/2018) for htn. This is the Subsequent Medicare Annual Wellness Exam, performed 12 months or more after the Initial AWV or the last Subsequent AWV    I have reviewed the patient's medical history in detail and updated the computerized patient record. History     Past Medical History:   Diagnosis Date    Arthritis     Asthma     Essential hypertension     GERD (gastroesophageal reflux disease)     High cholesterol     HTN (hypertension)     Ill-defined condition     hyperlipidemia    Left ventricular hypertrophy due to hypertensive disease     Renal artery stenosis (HCC)       Past Surgical History:   Procedure Laterality Date    HX BREAST BIOPSY Left 20 years ago    negative    HX GI      open inquinal hernia repair    HX GYN      tubal ligation    HX UROLOGICAL      Bilateral Renal stents.      Current Outpatient Prescriptions   Medication Sig Dispense Refill    isosorbide mononitrate ER (IMDUR) 60 mg CR tablet TAKE 1 TABLET BY MOUTH EVERY DAY 30 Tab 6    hydrALAZINE (APRESOLINE) 50 mg tablet TAKE 1 TABLET BY MOUTH THREE TIMES DAILY 90 Tab 3    furosemide (LASIX) 40 mg tablet TAKE 1 TABLET BY MOUTH ONCE EVERY DAY 30 Tab 6    rosuvastatin (CRESTOR) 5 mg tablet Take 1 Tab by mouth every other day. 15 Tab 3    beclomethasone (QVAR) 80 mcg/actuation aero Take 1 Puff by inhalation daily as needed. 1 Inhaler 11    valsartan (DIOVAN) 320 mg tablet TAKE 1 TABLET BY MOUTH EVERY DAY 30 Tab 11    NIFEdipine ER (ADALAT CC) 30 mg ER tablet Take 1 Tab by mouth daily. 30 Tab 6    potassium chloride SR (KLOR-CON 10) 10 mEq tablet TAKE 1 TABLET BY MOUTH TWICE DAILY 60 Tab 2    acetaminophen (TYLENOL) 500 mg tablet Take 500 mg by mouth every six (6) hours as needed for Pain.  albuterol-ipratropium (DUONEB) 2.5 mg-0.5 mg/3 ml nebulizer solution 3 mL by Nebulization route every six (6) hours as needed for Wheezing. 30 Vial 0    aspirin delayed-release 81 mg tablet Take 81 mg by mouth daily.  ranitidine (ZANTAC) 150 mg tablet Take 150 mg by mouth two (2) times daily as needed.        Allergies   Allergen Reactions    Amlodipine Other (comments)     Itching,  Muscle cramps    Clonidine Swelling    Ibuprofen Swelling    Levaquin [Levofloxacin] Unknown (comments)    Lisinopril Angioedema    Pcn [Penicillins] Swelling    Pravastatin Myalgia    Sulfa (Sulfonamide Antibiotics) Hives     Family History   Problem Relation Age of Onset    Cancer Mother     Heart Disease Mother     Heart Disease Father     Hypertension Father     Lung Disease Father     Hypertension Brother     Diabetes Brother     Heart Disease Brother     Kidney Disease Brother     Hypertension Brother      Social History   Substance Use Topics    Smoking status: Never Smoker    Smokeless tobacco: Never Used    Alcohol use 0.6 oz/week     1 Cans of beer per week      Comment: Drinks [employer]     Patient Active Problem List   Diagnosis Code    High cholesterol E78.00    Left ventricular hypertrophy due to hypertensive disease I11.9    Obesity E66.9    Edema of both legs--chronic venous insufficiency,amlodipine R60.0    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 R06.83    H/O gastroesophageal reflux (GERD) Z87.19    Essential hypertension, malignant I10    Stress at home F43.9    Bilateral renal artery stenosis (HCC)--s/p PTA/stenting 7/10/15 [facility] I70.1    History of tubal ligation Z98.51    Carotid bruit present R09.89    Bilateral carotid artery stenosis I65.23       Depression Risk Factor Screening:     PHQ over the last two weeks 11/30/2017   Little interest or pleasure in doing things Not at all   Feeling down, depressed or hopeless Not at all   Total Score PHQ 2 0     Alcohol Risk Factor Screening: You do not drink alcohol or very rarely. Functional Ability and Level of Safety:   Hearing Loss  Hearing is good. Activities of Daily Living  The home contains: handrails  Patient does total self care    Fall Risk  Fall Risk Assessment, last 12 mths 11/30/2017   Able to walk? Yes   Fall in past 12 months? No   Fall with injury? -   Number of falls in past 12 months -   Fall Risk Score -       Abuse Screen  Patient is not abused    Cognitive Screening   Evaluation of Cognitive Function:  Has your family/caregiver stated any concerns about your memory: no  Normal    Patient Care Team   Patient Care Team:  Abdulaziz Brown MD as PCP - General (Internal Medicine)  Bam Dwyer MD as Physician (Cardiology)  Ruth Malone RN as Nurse Navigator  KALIA Ramos as Nurse Practitioner (Nurse Practitioner)    Assessment/Plan   Education and counseling provided:  Are appropriate based on today's review and evaluation  End-of-Life planning (with patient's consent)-see ACP note  Screening for glaucoma-refer to eye MD    Diagnoses and all orders for this visit:    1. Essential hypertension, malignant   Moderate elevation   Lower home readings   Needs to take hydralazine tid-discussed   Nurse visit in 3-4 weeks for bp check  2. Bilateral carotid artery stenosis    3.  High cholesterol   Improved on crestor 5 mg qod      Health Maintenance Due   Topic Date Due    GLAUCOMA SCREENING Q2Y  01/31/2009    MEDICARE YEARLY EXAM  03/14/2018

## 2018-04-15 DIAGNOSIS — I70.1 BILATERAL RENAL ARTERY STENOSIS (HCC): ICD-10-CM

## 2018-04-15 DIAGNOSIS — R60.0 EDEMA OF BOTH LEGS: ICD-10-CM

## 2018-04-15 DIAGNOSIS — R06.83 SNORES: ICD-10-CM

## 2018-04-15 DIAGNOSIS — I10 ESSENTIAL HYPERTENSION, MALIGNANT: ICD-10-CM

## 2018-04-15 DIAGNOSIS — Z87.19 H/O GASTROESOPHAGEAL REFLUX (GERD): ICD-10-CM

## 2018-04-15 RX ORDER — POTASSIUM CHLORIDE 750 MG/1
TABLET, FILM COATED, EXTENDED RELEASE ORAL
Qty: 60 TAB | Refills: 0 | Status: SHIPPED | OUTPATIENT
Start: 2018-04-15 | End: 2018-07-10 | Stop reason: SDUPTHER

## 2018-04-16 RX ORDER — NIFEDIPINE 30 MG/1
TABLET, FILM COATED, EXTENDED RELEASE ORAL
Qty: 30 TAB | Refills: 6 | Status: SHIPPED | OUTPATIENT
Start: 2018-04-16 | End: 2018-12-11

## 2018-04-26 ENCOUNTER — CLINICAL SUPPORT (OUTPATIENT)
Dept: INTERNAL MEDICINE CLINIC | Age: 73
End: 2018-04-26

## 2018-04-26 DIAGNOSIS — Z01.30 BLOOD PRESSURE CHECK: Primary | ICD-10-CM

## 2018-04-26 NOTE — PROGRESS NOTES
Patient is here today for nurse visit only blood pressure check. Patient was seen 3/29/18 office visit and her BP was  180/72. Per Dr. True Knowles patient will need to follow up in month for  BP check which is today. Left arm 151/71 and Right arm  143/68. Patient was also advised to take Apresoline 5 mg Bid on her last visit. Patient shared with me she sometimes just   take 1 daily  of the Apresoline 5 mg not BID. Patient BP is better but advised to follow up in September.

## 2018-05-21 ENCOUNTER — TELEPHONE (OUTPATIENT)
Dept: INTERNAL MEDICINE CLINIC | Age: 73
End: 2018-05-21

## 2018-05-21 NOTE — TELEPHONE ENCOUNTER
Spoke with patient after 2 patient identifiers being note and advised that all info on the referral was correct, we went over the form and found that it is correct. Patient expressed understanding and has no further questions at this time.

## 2018-05-21 NOTE — TELEPHONE ENCOUNTER
Patient said her 's  is listed on her referral form. She need to see a eye doctor for glaucoma and want to make sure she give them the correct information. Please call the patient.  Thanks

## 2018-06-29 NOTE — PERIOP NOTES
[facility]  Ambulatory Surgery Unit  Pre-operative Instructions    Surgery/Procedure Date  7/5/18      Tentative Arrival Time 0815      1. On the day of your surgery/procedure, please report to the Ambulatory Surgery Unit Registration Desk and sign in at your designated time. [facility] side of the [facility] across from the [address] [place] [address]. Please have all of your health insurance cards and a photo ID. 2. You must have someone with you to drive you home, as you should not drive a car for 24 hours following anesthesia. Please make arrangements for a responsible adult friend or family member to stay with you for at least the first 24 hours after your surgery. 3. Do not have anything to eat or drink (including water, gum, mints, coffee, juice) after midnight   7/4/18. This may not apply to medications prescribed by your physician. (Please note below the special instructions with medications to take the morning of surgery, if applicable.)    4. We recommend you do not drink any alcoholic beverages for 24 hours before and after your surgery. 5. Contact your surgeons office for instructions on the following medications: non-steroidal anti-inflammatory drugs (i.e. Advil, Aleve), vitamins, and supplements. (Some surgeons will want you to stop these medications prior to surgery and others may allow you to take them)   **If you are currently taking Plavix, Coumadin, Aspirin and/or other blood-thinning agents, contact your surgeon for instructions. ** Your surgeon will partner with the physician prescribing these medications to determine if it is safe to stop or if you need to continue taking. Please do not stop taking these medications without instructions from your surgeon.     6. In an effort to help prevent surgical site infection, we ask that you shower with an anti-bacterial soap (i.e. Dial or Safeguard) on the morning of surgery, using a fresh towel after each shower. (Please begin this process with fresh bed linens.) Do not apply any lotions, powders, or deodorants after the shower on the day of your procedure. If applicable, please do not shave the operative site for 48 hours prior to surgery. 7. Wear comfortable clothes. Wear glasses instead of contacts. Do not bring any jewelry or money (other than copays or fees as instructed). Do not wear make-up, particularly mascara, the morning of your surgery. Do not wear nail polish, particularly if you are having foot /hand surgery. Wear your hair loose or down, no ponytails, buns, [place] pins or clips. All body piercings must be removed. 8. You should understand that if you do not follow these instructions your surgery may be cancelled. If your physical condition changes (i.e. fever, cold or flu) please contact your surgeon as soon as possible. 9. It is important that you be on time. If a situation occurs where you may be late, or if you have any questions or problems, please call [phone].    10. Your surgery time may be subject to change. You will receive a phone call the day prior to surgery to confirm your arrival time. Special Instructions: Take all medications and inhalers, as prescribed, on the morning of surgery with a sip of water EXCEPT: none    Patient reports she will contact Dr. Lizbeth Nunez for instructions on taking aspirin. Insulin Dependent Diabetic patients: Take your diabetic medications as prescribed the day before surgery. Hold all diabetic medications the day of surgery. If you are scheduled to arrive for surgery after 8:00 AM, and your AM blood sugar is >200, please call Ambulatory Surgery. I understand a pre-operative phone call will be made to verify my surgery time. In the event that I am not available, I give permission for a message to be left on my answering service and/or with another person?       YES [id number]    The above note was reviewed with patient during PAT phone call on 6/29/2018, patient verbalized understanding.         ___________________      ___________________      ________________  (Signature of Patient)          (Witness)                   (Date and Time)

## 2018-07-02 ENCOUNTER — TELEPHONE (OUTPATIENT)
Dept: CARDIOLOGY CLINIC | Age: 73
End: 2018-07-02

## 2018-07-02 NOTE — TELEPHONE ENCOUNTER
Spoke with patient. Verified patient with two patient identifiers. Advised to hold ASA 5 days prior to cataract surgery. Resume after. Patient verbalized understanding.

## 2018-07-02 NOTE — TELEPHONE ENCOUNTER
PATIENT WOULD PLEASE LIKE A RETURN CALL BACK CONCERNING SHOULD SHE STOP TAKING THE  ASPIRIN OR ANY OTHER MEDS THE DAY OF HER CATARACT SURGERY RIGHT EYE ON 7/5/18 THX.

## 2018-07-03 ENCOUNTER — ANESTHESIA EVENT (OUTPATIENT)
Dept: SURGERY | Age: 73
End: 2018-07-03
Payer: MEDICARE

## 2018-07-03 NOTE — PERIOP NOTES
RN noted patient's last cardiology visit was 1/24/2018 with instructions to followup in 6 mon, patient has appt scheduled for 7/25/18. Per Dr. Catalan Console ok to proceed.

## 2018-07-05 ENCOUNTER — HOSPITAL ENCOUNTER (OUTPATIENT)
Age: 73
Setting detail: OUTPATIENT SURGERY
Discharge: HOME | End: 2018-07-05
Attending: SPECIALIST | Admitting: SPECIALIST
Payer: MEDICARE

## 2018-07-05 ENCOUNTER — ANESTHESIA (OUTPATIENT)
Dept: SURGERY | Age: 73
End: 2018-07-05
Payer: MEDICARE

## 2018-07-05 VITALS
SYSTOLIC BLOOD PRESSURE: 166 MMHG | BODY MASS INDEX: 30.29 KG/M2 | HEIGHT: 67 IN | WEIGHT: 193 LBS | TEMPERATURE: 98 F | DIASTOLIC BLOOD PRESSURE: 75 MMHG | HEART RATE: 54 BPM | OXYGEN SATURATION: 97 % | RESPIRATION RATE: 16 BRPM

## 2018-07-05 PROCEDURE — V2632 POST CHMBR INTRAOCULAR LENS: HCPCS | Performed by: SPECIALIST

## 2018-07-05 PROCEDURE — 74011250636 HC RX REV CODE- 250/636: Performed by: SPECIALIST

## 2018-07-05 PROCEDURE — 74011250636 HC RX REV CODE- 250/636

## 2018-07-05 PROCEDURE — 76060000061 HC AMB SURG ANES 0.5 TO 1 HR: Performed by: SPECIALIST

## 2018-07-05 PROCEDURE — 76210000046 HC AMBSU PH II REC FIRST 0.5 HR: Performed by: SPECIALIST

## 2018-07-05 PROCEDURE — 77030018846 HC SOL IRR STRL H20 ICUM -A: Performed by: SPECIALIST

## 2018-07-05 PROCEDURE — 77030021352 HC CBL LD SYS DISP COVD -B: Performed by: SPECIALIST

## 2018-07-05 PROCEDURE — 76210000040 HC AMBSU PH I REC FIRST 0.5 HR: Performed by: SPECIALIST

## 2018-07-05 PROCEDURE — 74011000250 HC RX REV CODE- 250

## 2018-07-05 PROCEDURE — 74011000250 HC RX REV CODE- 250: Performed by: SPECIALIST

## 2018-07-05 PROCEDURE — 76030000000 HC AMB SURG OR TIME 0.5 TO 1: Performed by: SPECIALIST

## 2018-07-05 DEVICE — LENS IOL POST 1-PC 6X13 23.5 -- ACRYSOF: Type: IMPLANTABLE DEVICE | Site: EYE | Status: FUNCTIONAL

## 2018-07-05 RX ORDER — LIDOCAINE HYDROCHLORIDE 10 MG/ML
0.1 INJECTION, SOLUTION EPIDURAL; INFILTRATION; INTRACAUDAL; PERINEURAL AS NEEDED
Status: DISCONTINUED | OUTPATIENT
Start: 2018-07-05 | End: 2018-07-05 | Stop reason: HOSPADM

## 2018-07-05 RX ORDER — PILOCARPINE HYDROCHLORIDE 20 MG/ML
SOLUTION/ DROPS OPHTHALMIC AS NEEDED
Status: DISCONTINUED | OUTPATIENT
Start: 2018-07-05 | End: 2018-07-05 | Stop reason: HOSPADM

## 2018-07-05 RX ORDER — NEOMYCIN SULFATE, POLYMYXIN B SULFATE AND DEXAMETHASONE 3.5; 10000; 1 MG/ML; [USP'U]/ML; MG/ML
SUSPENSION/ DROPS OPHTHALMIC AS NEEDED
Status: DISCONTINUED | OUTPATIENT
Start: 2018-07-05 | End: 2018-07-05 | Stop reason: HOSPADM

## 2018-07-05 RX ORDER — CYCLOPENTOLATE HYDROCHLORIDE 20 MG/ML
SOLUTION/ DROPS OPHTHALMIC
Status: COMPLETED
Start: 2018-07-05 | End: 2018-07-05

## 2018-07-05 RX ORDER — SODIUM CHLORIDE 0.9 % (FLUSH) 0.9 %
5-10 SYRINGE (ML) INJECTION AS NEEDED
Status: DISCONTINUED | OUTPATIENT
Start: 2018-07-05 | End: 2018-07-05 | Stop reason: HOSPADM

## 2018-07-05 RX ORDER — FENTANYL CITRATE 50 UG/ML
25 INJECTION, SOLUTION INTRAMUSCULAR; INTRAVENOUS
Status: DISCONTINUED | OUTPATIENT
Start: 2018-07-05 | End: 2018-07-05 | Stop reason: HOSPADM

## 2018-07-05 RX ORDER — FENTANYL CITRATE 50 UG/ML
INJECTION, SOLUTION INTRAMUSCULAR; INTRAVENOUS AS NEEDED
Status: DISCONTINUED | OUTPATIENT
Start: 2018-07-05 | End: 2018-07-05 | Stop reason: HOSPADM

## 2018-07-05 RX ORDER — DIPHENHYDRAMINE HYDROCHLORIDE 50 MG/ML
12.5 INJECTION, SOLUTION INTRAMUSCULAR; INTRAVENOUS AS NEEDED
Status: DISCONTINUED | OUTPATIENT
Start: 2018-07-05 | End: 2018-07-05 | Stop reason: HOSPADM

## 2018-07-05 RX ORDER — ONDANSETRON 2 MG/ML
4 INJECTION INTRAMUSCULAR; INTRAVENOUS AS NEEDED
Status: DISCONTINUED | OUTPATIENT
Start: 2018-07-05 | End: 2018-07-05 | Stop reason: HOSPADM

## 2018-07-05 RX ORDER — LIDOCAINE HYDROCHLORIDE 20 MG/ML
INJECTION, SOLUTION EPIDURAL; INFILTRATION; INTRACAUDAL; PERINEURAL AS NEEDED
Status: DISCONTINUED | OUTPATIENT
Start: 2018-07-05 | End: 2018-07-05 | Stop reason: HOSPADM

## 2018-07-05 RX ORDER — SODIUM CHLORIDE, SODIUM LACTATE, POTASSIUM CHLORIDE, CALCIUM CHLORIDE 600; 310; 30; 20 MG/100ML; MG/100ML; MG/100ML; MG/100ML
25 INJECTION, SOLUTION INTRAVENOUS CONTINUOUS
Status: DISCONTINUED | OUTPATIENT
Start: 2018-07-05 | End: 2018-07-05 | Stop reason: HOSPADM

## 2018-07-05 RX ORDER — SODIUM CHLORIDE 9 MG/ML
25 INJECTION, SOLUTION INTRAVENOUS CONTINUOUS
Status: DISCONTINUED | OUTPATIENT
Start: 2018-07-05 | End: 2018-07-05 | Stop reason: HOSPADM

## 2018-07-05 RX ORDER — TROPICAMIDE 10 MG/ML
1 SOLUTION/ DROPS OPHTHALMIC
Status: COMPLETED | OUTPATIENT
Start: 2018-07-05 | End: 2018-07-05

## 2018-07-05 RX ORDER — ACETAMINOPHEN 10 MG/ML
INJECTION, SOLUTION INTRAVENOUS AS NEEDED
Status: DISCONTINUED | OUTPATIENT
Start: 2018-07-05 | End: 2018-07-05 | Stop reason: HOSPADM

## 2018-07-05 RX ORDER — CYCLOPENTOLATE HYDROCHLORIDE 20 MG/ML
1 SOLUTION/ DROPS OPHTHALMIC
Status: COMPLETED | OUTPATIENT
Start: 2018-07-05 | End: 2018-07-05

## 2018-07-05 RX ORDER — OXYCODONE AND ACETAMINOPHEN 5; 325 MG/1; MG/1
1 TABLET ORAL
Status: DISCONTINUED | OUTPATIENT
Start: 2018-07-05 | End: 2018-07-05 | Stop reason: HOSPADM

## 2018-07-05 RX ORDER — TROPICAMIDE 10 MG/ML
SOLUTION/ DROPS OPHTHALMIC
Status: COMPLETED
Start: 2018-07-05 | End: 2018-07-05

## 2018-07-05 RX ORDER — TETRACAINE HYDROCHLORIDE 5 MG/ML
SOLUTION OPHTHALMIC AS NEEDED
Status: DISCONTINUED | OUTPATIENT
Start: 2018-07-05 | End: 2018-07-05 | Stop reason: HOSPADM

## 2018-07-05 RX ORDER — PHENYLEPHRINE HYDROCHLORIDE 100 MG/ML
1 SOLUTION/ DROPS OPHTHALMIC
Status: COMPLETED | OUTPATIENT
Start: 2018-07-05 | End: 2018-07-05

## 2018-07-05 RX ORDER — LIDOCAINE HYDROCHLORIDE 10 MG/ML
INJECTION, SOLUTION EPIDURAL; INFILTRATION; INTRACAUDAL; PERINEURAL AS NEEDED
Status: DISCONTINUED | OUTPATIENT
Start: 2018-07-05 | End: 2018-07-05 | Stop reason: HOSPADM

## 2018-07-05 RX ORDER — ONDANSETRON 2 MG/ML
INJECTION INTRAMUSCULAR; INTRAVENOUS AS NEEDED
Status: DISCONTINUED | OUTPATIENT
Start: 2018-07-05 | End: 2018-07-05 | Stop reason: HOSPADM

## 2018-07-05 RX ORDER — MIDAZOLAM HYDROCHLORIDE 1 MG/ML
INJECTION, SOLUTION INTRAMUSCULAR; INTRAVENOUS AS NEEDED
Status: DISCONTINUED | OUTPATIENT
Start: 2018-07-05 | End: 2018-07-05 | Stop reason: HOSPADM

## 2018-07-05 RX ORDER — PROPARACAINE HYDROCHLORIDE 5 MG/ML
1 SOLUTION/ DROPS OPHTHALMIC
Status: COMPLETED | OUTPATIENT
Start: 2018-07-05 | End: 2018-07-05

## 2018-07-05 RX ORDER — SODIUM CHLORIDE 0.9 % (FLUSH) 0.9 %
5-10 SYRINGE (ML) INJECTION EVERY 8 HOURS
Status: DISCONTINUED | OUTPATIENT
Start: 2018-07-05 | End: 2018-07-05 | Stop reason: HOSPADM

## 2018-07-05 RX ADMIN — MIDAZOLAM HYDROCHLORIDE 1 MG: 1 INJECTION, SOLUTION INTRAMUSCULAR; INTRAVENOUS at 10:07

## 2018-07-05 RX ADMIN — CYCLOPENTOLATE HYDROCHLORIDE 1 DROP: 20 SOLUTION/ DROPS OPHTHALMIC at 09:00

## 2018-07-05 RX ADMIN — FENTANYL CITRATE 25 MCG: 50 INJECTION, SOLUTION INTRAMUSCULAR; INTRAVENOUS at 09:49

## 2018-07-05 RX ADMIN — TROPICAMIDE 1 DROP: 10 SOLUTION/ DROPS OPHTHALMIC at 09:01

## 2018-07-05 RX ADMIN — PHENYLEPHRINE HYDROCHLORIDE 1 DROP: 100 SOLUTION/ DROPS OPHTHALMIC at 09:08

## 2018-07-05 RX ADMIN — FENTANYL CITRATE 50 MCG: 50 INJECTION, SOLUTION INTRAMUSCULAR; INTRAVENOUS at 09:55

## 2018-07-05 RX ADMIN — TROPICAMIDE 1 DROP: 10 SOLUTION/ DROPS OPHTHALMIC at 09:08

## 2018-07-05 RX ADMIN — PROPARACAINE HYDROCHLORIDE 1 DROP: 5 SOLUTION/ DROPS OPHTHALMIC at 09:01

## 2018-07-05 RX ADMIN — CYCLOPENTOLATE HYDROCHLORIDE 1 DROP: 20 SOLUTION/ DROPS OPHTHALMIC at 09:07

## 2018-07-05 RX ADMIN — ACETAMINOPHEN 1000 MG: 10 INJECTION, SOLUTION INTRAVENOUS at 10:24

## 2018-07-05 RX ADMIN — PHENYLEPHRINE HYDROCHLORIDE 1 DROP: 100 SOLUTION/ DROPS OPHTHALMIC at 09:12

## 2018-07-05 RX ADMIN — MIDAZOLAM HYDROCHLORIDE 1 MG: 1 INJECTION, SOLUTION INTRAMUSCULAR; INTRAVENOUS at 09:34

## 2018-07-05 RX ADMIN — TROPICAMIDE 1 DROP: 10 SOLUTION/ DROPS OPHTHALMIC at 09:12

## 2018-07-05 RX ADMIN — CYCLOPENTOLATE HYDROCHLORIDE 1 DROP: 20 SOLUTION/ DROPS OPHTHALMIC at 09:12

## 2018-07-05 RX ADMIN — PHENYLEPHRINE HYDROCHLORIDE 1 DROP: 100 SOLUTION/ DROPS OPHTHALMIC at 09:01

## 2018-07-05 RX ADMIN — FENTANYL CITRATE 25 MCG: 50 INJECTION, SOLUTION INTRAMUSCULAR; INTRAVENOUS at 09:42

## 2018-07-05 RX ADMIN — ONDANSETRON 4 MG: 2 INJECTION INTRAMUSCULAR; INTRAVENOUS at 10:07

## 2018-07-05 RX ADMIN — SODIUM CHLORIDE 25 ML/HR: 900 INJECTION, SOLUTION INTRAVENOUS at 08:57

## 2018-07-05 RX ADMIN — MIDAZOLAM HYDROCHLORIDE 1 MG: 1 INJECTION, SOLUTION INTRAMUSCULAR; INTRAVENOUS at 09:39

## 2018-07-05 NOTE — PERIOP NOTES
Permission received to review discharge instructions and discuss private health information with Hank Hernandesld, daughter.

## 2018-07-05 NOTE — ANESTHESIA POSTPROCEDURE EVALUATION
Post-Anesthesia Evaluation and Assessment    Patient: Inderjit Rodriguez MRN: [id number]  SSN: [id number]    YOB: 1945  Age: 75 y.o. Sex: female       Cardiovascular Function/Vital Signs  Visit Vitals    /75    Pulse (!) 54    Temp 36.7 °C (98 °F)    Resp 16    Ht 5' 7\" (1.702 m)    Wt 87.5 kg (193 lb)    SpO2 97%    BMI 30.23 kg/m2       Patient is status post MAC anesthesia for Procedure(s):  CATARACT EXTRACTION WITH INTRA OCULAR LENS IMPLANT RIGHT EYE . Nausea/Vomiting: None    Postoperative hydration reviewed and adequate. Pain:  Pain Scale 1: Numeric (0 - 10) (07/05/18 1045)  Pain Intensity 1: 0 (07/05/18 1045)   Managed    Neurological Status:   Neuro (WDL): Within Defined Limits (07/05/18 1045)  Neuro  Neurologic State: Drowsy (07/05/18 1032)  LUE Motor Response: Spontaneous  (07/05/18 1045)  LLE Motor Response: Spontaneous  (07/05/18 1045)  RUE Motor Response: Spontaneous  (07/05/18 1045)  RLE Motor Response: Spontaneous  (07/05/18 1045)   At baseline    Mental Status and Level of Consciousness: Arousable    Pulmonary Status:   O2 Device: Room air (07/05/18 1032)   Adequate oxygenation and airway patent    Complications related to anesthesia: None    Post-anesthesia assessment completed.  No concerns    Signed By: Samantha Houser MD     July 5, 2018

## 2018-07-05 NOTE — PERIOP NOTES
Pt. Alert. Denies pain or chill. Discharge instructions reviewed with caregiver and patient. Allowed and answered questions. Tolerating PO fluids. Both state ready for discharge.  1051 Pt discharged to car without incident

## 2018-07-05 NOTE — PERIOP NOTES
Charanjit Garber  1945  [id number]    Situation:  Verbal report given from: Lisseth Quintero CRNA and Félix Duron RN  Procedure: Procedure(s):  CATARACT EXTRACTION WITH INTRA OCULAR LENS IMPLANT RIGHT EYE     Background:    Preoperative diagnosis: CATARACT RIGHT EYE    Postoperative diagnosis: CATARACT RIGHT EYE    :  Dr. Mariana Hernandez    Assistant(s): Circ-1: Travis Bradford RN  Scrub Tech-1: Ellin Rubinstein    Specimens: * No specimens in log *    Assessment:  Intra-procedure medications         Anesthesia gave intra-procedure sedation and medications, see anesthesia flow sheet     Intravenous fluids: LR@ KVO     Vital signs stable.  Pt denies pain or chill      Recommendation:    Permission to share finding with daughter

## 2018-07-05 NOTE — OP NOTES
[address]  [phone]      REPORT OF OPERATION  - Monofocal Implant w/ Cataract Surgery    Patient: Oleg Huertas  :       MRN:     [id number]    Date:     2018      PREOPERATIVE DIAGNOSIS:    CATARACT, right eye. POSTOPERATIVE DIAGNOSIS:   SAME. OPERATION:  PHACOEMULSIFICATION OF CATARACT THROUGH A CLEAR CORNEAL INCISION USING A TEMPORAL APPROACH AND IMPLANTATION OF ORAL NATURAL LENS, right eye. SURGEON:  Andre Brown M.D. ANESTHESIA:  TOPICAL/IV SEDATION    FINDINGS AND PROCEDURE:  The patient was taken to the Operating Room and correctly identified, whereupon intravenous lines and cardiac monitor were established. In the Operative Area, the patient was given a series of topical anesthetic drops consisting of Marcaine 0.75%, Tetracaine, and Lidocaine 1%, preservative free. The eye was prepared and draped in the usual sterile ophthalmic fashion. A wire lid speculum was inserted between the conjunctival fornices of the eye. Using the microscope, a temporal approach bed was developed. Using an angled Pathfinder clear corneal blade measuring 2.5 millimeters, a shelved corneal incision was made at the level of the corneal margin slightly anterior to the conjunctival peripherovascular arcade. This was followed by a paracentesis at the 5 o'clock position. Viscoelastic was infused within the anterior chamber. An anterior capsulotomy was performed with a fashioned cystotome. Hydrodissection and hydrodelineation were achieved with introduction of a cannula into the lens nucleus at the 5, 6 and 7 o'clock positions, and lamellar formation of the nucleus was achieved. Phacoemulsification was performed with central sculpting. After the lens was sculpted approximately three-quarters depth, Viscoelastic was infused into the anterior chamber.   Using a nuclear lens cracker, the nucleus was cracked in a vertical direction. Hydrodissection was performed. The lens nucleus was rotated, and additional sculpting was performed, followed by cracking, creating four quadrants of nuclei. With the phacoemulsification handpiece in the capture mode, each separate quadrant of lens nucleus was carefully removed without complication. Noting the posterior capsule to be intact, the remaining cortical material was irrigated and aspirated from the eye in the maximum irrigation/aspiration mode. Viscoelastic was infused into the anterior chamber, and the angled Pathfinder clear corneal blade was reintroduced through the corneal incision, which was extended to approximately 3 millimeters. The Jonel natural intraocular lens implant was coated with Viscoelastic and carefully placed within the capsular bag with the . The corneal flap was noted to be sealed and intact. The wound was tested and there was no evidence of leak; therefore, no suture was required. Topical Maxitrol,Pilocarpine 2% were applied to the eye along with Betadine eyedrops was instilled into the inferior fornix. The patient was discharged home in good condition.     Christiano Montiel., MD    7/5/2018    CLP/  d&t:

## 2018-07-05 NOTE — ANESTHESIA PREPROCEDURE EVALUATION
Anesthetic History   No history of anesthetic complications            Review of Systems / Medical History  Patient summary reviewed and pertinent labs reviewed    Pulmonary            Asthma (affected by humidity, o/w controlled)        Neuro/Psych              Cardiovascular    Hypertension          PAD (has renal artery stents) and hyperlipidemia    Exercise tolerance: >4 METS     GI/Hepatic/Renal     GERD (no problem now)           Endo/Other        Morbid obesity and arthritis     Other Findings            Physical Exam    Airway  Mallampati: III  TM Distance: 4 - 6 cm  Neck ROM: normal range of motion   Mouth opening: Normal     Cardiovascular    Rhythm: regular  Rate: normal         Dental    Dentition: Full upper dentures     Pulmonary  Breath sounds clear to auscultation               Abdominal  GI exam deferred       Other Findings            Anesthetic Plan    ASA: 2  Anesthesia type: MAC          Induction: Intravenous  Anesthetic plan and risks discussed with: Patient

## 2018-07-05 NOTE — IP AVS SNAPSHOT
[address] [facility] 
[phone] Patient: Quentin Christian MRN: [id number] AII:[id number] About your hospitalization You were admitted on:  July 5, 2018 You last received care in the:  [facility] PACU You were discharged on:  July 5, 2018 Why you were hospitalized Your primary diagnosis was:  Not on File Follow-up Information Follow up With Details Comments Contact Info Lam Sin MD   [address] [facility] [address] [facility] 
[phone] Your Scheduled Appointments Wednesday July 25, 2018 10:00 AM EDT  
ESTABLISHED PATIENT with Padma Li MD  
[facility]) [address]  
[phone] Discharge Orders None A check jaqui indicates which time of day the medication should be taken. My Medications ASK your doctor about these medications Instructions Each Dose to Equal  
 Morning Noon Evening Bedtime  
 acetaminophen 500 mg tablet Commonly known as:  TYLENOL Notes to Patient:  Do not take until 4:15 pm today. Take 500 mg by mouth every six (6) hours as needed for Pain. 500 mg  
    
   
   
   
  
 albuterol-ipratropium 2.5 mg-0.5 mg/3 ml Nebu Commonly known as:  DUONEB  
   
 3 mL by Nebulization route every six (6) hours as needed for Wheezing. 3 mL  
    
   
   
   
  
 aspirin delayed-release 81 mg tablet Take 81 mg by mouth daily. 81 mg  
    
   
   
   
  
 beclomethasone 80 mcg/actuation Aero Commonly known as:  QVAR Take 1 Puff by inhalation daily as needed. 1 Puff  
    
   
   
   
  
 furosemide 40 mg tablet Commonly known as:  LASIX TAKE 1 TABLET BY MOUTH ONCE EVERY DAY  
     
   
   
   
  
 hydrALAZINE 50 mg tablet Commonly known as:  APRESOLINE  
   
 TAKE 1 TABLET BY MOUTH THREE TIMES DAILY  
     
   
   
   
  
 isosorbide mononitrate ER 60 mg CR tablet Commonly known as:  IMDUR  
   
 TAKE 1 TABLET BY MOUTH EVERY DAY  
     
   
   
   
  
 NIFEdipine ER 30 mg ER tablet Commonly known as:  ADALAT CC  
   
 TAKE 1 TABLET BY MOUTH ONCE DAILY potassium chloride SR 10 mEq tablet Commonly known as:  KLOR-CON 10  
   
 TAKE 1 TABLET BY MOUTH TWICE DAILY  
     
   
   
   
  
 raNITIdine 150 mg tablet Commonly known as:  ZANTAC Take 150 mg by mouth two (2) times daily as needed. 150 mg  
    
   
   
   
  
 rosuvastatin 5 mg tablet Commonly known as:  CRESTOR  
   
 TAKE 1 TABLET BY MOUTH EVERY OTHER DAY  
     
   
   
   
  
 valsartan 320 mg tablet Commonly known as:  DIOVAN  
   
 TAKE 1 TABLET BY MOUTH EVERY DAY Discharge Instructions Discharge Instructions Post Cataract Surgery [facility]/ Dr. Sybil Camarillo Right [address] [facility] [address], [address] [place], [address] [place] [address] Phone: [phone]  Fax: [phone] Call for Emergency ONLY: [phone] 1. Limited activity until seen by Dr. aDvid Torres tomorrow. 2.   Be careful walking, especially on steps or curbs, the eye patch may effect your depth perception. 3.   Keep the [facility] in place until seen by Dr. David Torres tomorrow. 4.   When sleeping please sleep on LEFT side. 5.   NO shower or bath until seen by Dr. David Torres. 6.   You will receive further instructions at your post-op appointment tomorrow   regarding removal of eye shield & beginning post-op eye drops as well as returning to normal activities and showering. 7.   Please have someone drive you to your postop appointment. 8.   Please take Tylenol, Ibuprofen, or Aleve for discomfort. 9.   Call Dr. Patel Malone office for severe pain unrelieved by the Tylenol, Ibuprofen, or Aleve or nausea, or call his cell phone at [phone] if after hours. >>>You received an IV form of Tylenol 1000mg during your surgery, you may take tylenol (or pain medication containing Tylenol or Acetaminophen) in 6 hours at 4:15 pm.<<< 
 
 
DO NOT TAKE SLEEPING MEDICATIONS OR ANTIANXIETY MEDICATIONS WHILE TAKING NARCOTIC PAIN MEDICATIONS,  ESPECIALLY THE NIGHT OF ANESTHESIA. CPAP PATIENTS BE SURE TO WEAR MACHINE WHENEVER NAPPING OR SLEEPING. DISCHARGE SUMMARY from Nurse The following personal items collected during your admission are returned to you:  
Dental Appliance: Dental Appliances: Lowers, Uppers (daughter) Vision: Visual Aid: Glasses, With patient Hearing Aid:   
Jewelry: Jewelry: None Clothing: Clothing: With patient Other Valuables: Other Valuables: Franny (daughter) Valuables sent to safe:   
 
PATIENT INSTRUCTIONS: 
 
After general anesthesia or intravenous sedation, for 24 hours or while taking prescription Narcotics: · Someone should be with you for the next 24 hours. · For your own safety, a responsible adult must drive you home. · Limit your activities · Recommended activity: rest today, up with assistance today. Do not climb stairs or shower unattended for the next 24 hours. · Please start with a soft bland diet and advance as tolerated (no nausea) to regular diet. · Do not drive and operate hazardous machinery · Do not make important personal or business decisions · Do  not drink alcoholic beverages · If you have not urinated within 8 hours after discharge, please contact your surgeon on call. Report the following to your surgeon: 
· Excessive pain, swelling, redness or odor of or around the surgical area · Temperature over 100.5 · Nausea and vomiting lasting longer than 4 hours or if unable to take medications · Any signs of decreased circulation or nerve impairment to extremity: change in color, persistent  numbness, tingling, coldness or increase pain ·  
·  
· You will receive a Post Operative Call from one of the Recovery Room Nurses on the day after your surgery to check on you. It is very important for us to know how you are recovering after your surgery. · You may receive an e-mail or letter in the mail from [facility] regarding your experience with us in the Ambulatory Surgery Unit. Your feedback is valuable to us and we appreciate your participation in the survey. ·  
 
·  
· If the above instructions are not adequate, please contact Haile Diop RN, Radha anesthesia Nurse Manager or our Anesthesiologist, at [phone]. If you are having problems after your surgery, call the physician at his office number. ·  
· We wish youre a speedy recovery ? What to do at Home: *  Please give a list of your current medications to your Primary Care Provider. *  Please update this list whenever your medications are discontinued, doses are 
    changed, or new medications (including over-the-counter products) are added. *  Please carry medication information at all times in case of emergency situations. These are general instructions for a healthy lifestyle: No smoking/ No tobacco products/ Avoid exposure to second hand smoke Surgeon General's Warning:  Quitting smoking now greatly reduces serious risk to your health. Obesity, smoking, and sedentary lifestyle greatly increases your risk for illness A healthy diet, regular physical exercise & weight monitoring are important for maintaining a healthy lifestyle You may be retaining fluid if you have a history of heart failure or if you experience any of the following symptoms:  Weight gain of 3 pounds or more overnight or 5 pounds in a week, increased swelling in our hands or feet or shortness of breath while lying flat in bed. Please call your doctor as soon as you notice any of these symptoms; do not wait until your next office visit. Recognize signs and symptoms of STROKE: 
 
B - Balance E - Eyes F-face looks uneven A-arms unable to move or move even S-speech slurred or non-existent T-time-call 911 as soon as signs and symptoms begin-DO NOT go Back to bed or wait to see if you get better-TIME IS BRAIN. If you have not received your influenza and/or pneumococcal vaccine, please follow up with your primary care physician. The discharge information has been reviewed with the patient and caregiver. The patient and caregiver verbalized understanding. ACO Transitions of Care Introducing [employer] [address] Dana Lao offers a voluntary care coordination program to provide high quality service and care to [facility] fee-for-service beneficiaries. Williamrijasper Winston was designed to help you enhance your health and well-being through the following services: ? Transitions of Care  support for individuals who are transitioning from one care setting to another (example: Hospital to home). ? Chronic and Complex Care Coordination  support for individuals and caregivers of those with serious or chronic illnesses or with more than one chronic (ongoing) condition and those who take a number of different medications. If you meet specific medical criteria, a [facility] [address] may call you directly to coordinate your care with your primary care physician and your other care providers. For questions about the [facility] programs, please, contact your physicians office. For general questions or additional information about Accountable Care Organizations: 
Please visit www.medicare.gov/acos. html or call 1-800-MEDICARE (2-505.650.8365) TTY users should call 8-380.616.6819. Introducing [facility] & HEALTH SERVICES! Thea Baker introduces Scint-X patient portal. Now you can access parts of your medical record, email your doctor's office, and request medication refills online.    
 
1. In your internet browser, go to [email or web address] 2. Click on the First Time User? Click Here link in the Sign In box. You will see the New Member Sign Up page. 3. Enter your FluGen Access Code exactly as it appears below. You will not need to use this code after youve completed the sign-up process. If you do not sign up before the expiration date, you must request a new code. · FluGen Access Code: [email or web address] Expires: 9/26/2018  9:44 AM 
 
4. Enter the last four digits of your Social Security Number (xxxx) and Date of Birth (mm/dd/yyyy) as indicated and click Submit. You will be taken to the next sign-up page. 5. Create a [email or web address] ID. This will be your FluGen login ID and cannot be changed, so think of one that is secure and easy to remember. 6. Create a FluGen password. You can change your password at any time. 7. Enter your Password Reset Question and Answer. This can be used at a later time if you forget your password. 8. Enter your e-mail address. You will receive e-mail notification when new information is available in [address] 9. Click Sign Up. You can now view and download portions of your medical record. 10. Click the Download Summary menu link to download a portable copy of your medical information. If you have questions, please visit the Frequently Asked Questions section of the FluGen website. Remember, FluGen is NOT to be used for urgent needs. For medical emergencies, dial 911. Now available from your iPhone and Android! Introducing Edgardo Alberts As a [employer] patient, I wanted to make you aware of our electronic visit tool called Edgardo Alberts. [employer] 24/7 allows you to connect within minutes with a medical provider 24 hours a day, seven days a week via a mobile device or tablet or logging into a secure website from your computer. You can access Edgardo Alberts from anywhere in the [place]. A virtual visit might be right for you when you have a simple condition and feel like you just dont want to get out of bed, or cant get away from work for an appointment, when your regular [facility] provider is not available (evenings, weekends or holidays), or when youre out of town and need minor care. Electronic visits cost only $49 and if the [facility] 24/7 provider determines a prescription is needed to treat your condition, one can be electronically transmitted to a nearby pharmacy*. Please take a moment to enroll today if you have not already done so. The enrollment process is free and takes just a few minutes. To enroll, please download the [employer]reet 24/7 gonsalo to your tablet or phone, or visit [email or web address] to enroll on your computer. And, as an [address] [place] [address] patient with a [employer] account, the results of your visits will be scanned into your electronic medical record and your primary care provider will be able to view the scanned results. We urge you to continue to see your regular [facility] provider for your ongoing medical care. And while your primary care provider may not be the one available when you seek a [employer] virtual visit, the peace of mind you get from getting a real diagnosis real time can be priceless. For more information on Ed[employer]ardo [employer], view our Frequently Asked Questions (FAQs) at [email or web address]. Sincerely, 
 
Jeremi Butler MD 
Chief Medical Officer Audrey Lao *:  certain medications cannot be prescribed via Ed[employer]ardo [employer] Providers Seen During Your Hospitalization Provider Specialty Primary office phone Quyen Allen MD Ophthalmology [phone] Your Primary Care Physician (PCP) Primary Care Physician Office Phone Office Fax [facility] [phone] You are allergic to the following Allergen Reactions Amlodipine Other (comments) Itching, Muscle cramps Clonidine Swelling Ibuprofen Swelling Levaquin (Levofloxacin) Unknown (comments) Lisinopril Angioedema Other Medication Rash Itching Patient reports she is allergic to the PPD test for TB Pcn (Penicillins) Swelling Pravastatin Myalgia Sulfa (Sulfonamide Antibiotics) Hives Recent Documentation Height Weight BMI OB Status Smoking Status 1.702 m 87.5 kg 30.23 kg/m2 Menopause Never Smoker Emergency Contacts Name Discharge Info Relation Home Work Mobile CarlosBrigitte DISCHARGE CAREGIVER [3] Child [2] [phone] Marc Miramontes  Child [2] [phone]  [phone] Reilly Popkezia [3] [phone]    
 Scott JACOBSON  Child [2] [phone] Patient Belongings The following personal items are in your possession at time of discharge: 
  Dental Appliances: Lowers, Uppers (daughter)  Visual Aid: Glasses, With patient      Home Medications: None   Jewelry: None  Clothing: With patient    Other Valuables: Bedelia Conception (daughter) Please provide this summary of care documentation to your next provider. Signatures-by signing, you are acknowledging that this After Visit Summary has been reviewed with you and you have received a copy. Patient Signature:  ____________________________________________________________ Date:  ____________________________________________________________  
  
Flakita Ordonez Provider Signature:  ____________________________________________________________ Date:  ____________________________________________________________

## 2018-07-05 NOTE — DISCHARGE INSTRUCTIONS
Discharge Instructions Post Cataract Surgery  [facility]/ Dr. Keysha Ayala  [address], [address]  [place], [address]  Phone: [phone]  Fax: [phone]  Call for Emergency ONLY: [phone]    1. Limited activity until seen by Dr. Jerilee Collet tomorrow. 2.   Be careful walking, especially on steps or curbs, the eye patch may effect your depth perception. 3.   Keep the [facility] in place until seen by Dr. Jerilee Collet tomorrow. 4.   When sleeping please sleep on LEFT side. 5.   NO shower or bath until seen by Dr. Jerilee Collet. 6.   You will receive further instructions at your post-op appointment tomorrow   regarding removal of eye shield & beginning post-op eye drops as well as returning to normal activities and showering. 7.   Please have someone drive you to your postop appointment. 8.   Please take Tylenol, Ibuprofen, or Aleve for discomfort. 9.   Call Dr. Sonny Caldera office for severe pain unrelieved by the Tylenol, Ibuprofen, or Aleve or nausea, or call his cell phone at [phone] if after hours.    >>>You received an IV form of Tylenol 1000mg during your surgery, you may take tylenol (or pain medication containing Tylenol or Acetaminophen) in 6 hours at 4:15 pm.<<<      DO NOT TAKE SLEEPING MEDICATIONS OR ANTIANXIETY MEDICATIONS WHILE TAKING NARCOTIC PAIN MEDICATIONS,  ESPECIALLY THE NIGHT OF ANESTHESIA. CPAP PATIENTS BE SURE TO WEAR MACHINE WHENEVER NAPPING OR SLEEPING. DISCHARGE SUMMARY from Nurse    The following personal items collected during your admission are returned to you:   Dental Appliance: Dental Appliances: Lowers, Uppers (daughter)  Vision: Visual Aid: Glasses, With patient  Hearing Aid:    Jewelry: Jewelry: None  Clothing: Clothing: With patient  Other Valuables:  Other Valuables: Purse (daughter)  Valuables sent to safe:      PATIENT INSTRUCTIONS:    After general anesthesia or intravenous sedation, for 24 hours or while taking prescription Narcotics:  · Someone should be with you for the next 24 hours. · For your own safety, a responsible adult must drive you home. · Limit your activities  · Recommended activity: rest today, up with assistance today. Do not climb stairs or shower unattended for the next 24 hours. · Please start with a soft bland diet and advance as tolerated (no nausea) to regular diet. · Do not drive and operate hazardous machinery  · Do not make important personal or business decisions  · Do  not drink alcoholic beverages  · If you have not urinated within 8 hours after discharge, please contact your surgeon on call. Report the following to your surgeon:  · Excessive pain, swelling, redness or odor of or around the surgical area  · Temperature over 100.5  · Nausea and vomiting lasting longer than 4 hours or if unable to take medications  · Any signs of decreased circulation or nerve impairment to extremity: change in color, persistent  numbness, tingling, coldness or increase pain  ·   ·   · You will receive a Post Operative Call from one of the Recovery Room Nurses on the day after your surgery to check on you. It is very important for us to know how you are recovering after your surgery. · You may receive an e-mail or letter in the mail from [employer] regarding your experience with us in the Ambulatory Surgery Unit. Your feedback is valuable to us and we appreciate your participation in the survey. ·     ·   · If the above instructions are not adequate, please contact Ingris Hendrix RN, Radha anesthesia Nurse Manager or our Anesthesiologist, at [phone]. If you are having problems after your surgery, call the physician at his office number. ·   · We wish youre a speedy recovery ? What to do at Home:      *  Please give a list of your current medications to your Primary Care Provider.     *  Please update this list whenever your medications are discontinued, doses are      changed, or new medications (including over-the-counter products) are added. *  Please carry medication information at all times in case of emergency situations. These are general instructions for a healthy lifestyle:    No smoking/ No tobacco products/ Avoid exposure to second hand smoke    Surgeon General's Warning:  Quitting smoking now greatly reduces serious risk to your health. Obesity, smoking, and sedentary lifestyle greatly increases your risk for illness    A healthy diet, regular physical exercise & weight monitoring are important for maintaining a healthy lifestyle    You may be retaining fluid if you have a history of heart failure or if you experience any of the following symptoms:  Weight gain of 3 pounds or more overnight or 5 pounds in a week, increased swelling in our hands or feet or shortness of breath while lying flat in bed. Please call your doctor as soon as you notice any of these symptoms; do not wait until your next office visit. Recognize signs and symptoms of STROKE:    B - Balance  E - Eyes    F-face looks uneven    A-arms unable to move or move even    S-speech slurred or non-existent    T-time-call 911 as soon as signs and symptoms begin-DO NOT go       Back to bed or wait to see if you get better-TIME IS BRAIN. If you have not received your influenza and/or pneumococcal vaccine, please follow up with your primary care physician. The discharge information has been reviewed with the patient and caregiver. The patient and caregiver verbalized understanding.

## 2018-07-05 NOTE — PERIOP NOTES
Administered one round of eye drops per Dr. Vivien Brooks. Dr. Vivien Brooks administered 5th round himself.

## 2018-07-17 RX ORDER — IRBESARTAN 300 MG/1
300 TABLET ORAL
Qty: 30 TAB | Refills: 6 | Status: SHIPPED | OUTPATIENT
Start: 2018-07-17 | End: 2018-12-11

## 2018-07-25 ENCOUNTER — OFFICE VISIT (OUTPATIENT)
Dept: CARDIOLOGY CLINIC | Age: 73
End: 2018-07-25

## 2018-07-25 VITALS
DIASTOLIC BLOOD PRESSURE: 84 MMHG | RESPIRATION RATE: 16 BRPM | HEIGHT: 66 IN | HEART RATE: 75 BPM | WEIGHT: 191 LBS | OXYGEN SATURATION: 95 % | SYSTOLIC BLOOD PRESSURE: 136 MMHG | BODY MASS INDEX: 30.7 KG/M2

## 2018-07-25 DIAGNOSIS — I70.1 BILATERAL RENAL ARTERY STENOSIS (HCC): ICD-10-CM

## 2018-07-25 DIAGNOSIS — I11.9 HYPERTENSIVE LEFT VENTRICULAR HYPERTROPHY, WITHOUT HEART FAILURE: ICD-10-CM

## 2018-07-25 DIAGNOSIS — E78.00 HIGH CHOLESTEROL: ICD-10-CM

## 2018-07-25 DIAGNOSIS — I10 HYPERTENSION, UNSPECIFIED TYPE: Primary | ICD-10-CM

## 2018-07-25 NOTE — PROGRESS NOTES
Chief Complaint   Patient presents with    Hypertension     6 mo f/u      1. Have you been to the ER, urgent care clinic since your last visit? Hospitalized since your last visit? No    2. Have you seen or consulted any other health care providers outside of the [facility] since your last visit? Include any pap smears or colon screening.  Yes Dr. Citlaly To

## 2018-07-25 NOTE — MR AVS SNAPSHOT
[address] [facility] 
 
 
 [address] 
[phone] Patient: Didi Field MRN: [id number] Visit Information Date & Time Provider Department Dept. Phone Encounter #  
 7/25/2018 10:00 AM MD Marita Sparks [facility] - PSYCHIATRIC SUPPORT [place] [phone] Your Appointments 10/1/2018  9:15 AM  
ROUTINE CARE with Abdulkadir Garber, [facility] Appt Note: 6 mon f/u  
 [place] [address] [facility] [address]  
  
   
 [place] [address] [facility]  [address] [place]  
  
    
 1/29/2019  9:15 AM  
ESTABLISHED PATIENT with MD Marita Sparks [facility]) Appt Note: SIX MONTH FOLLOW UP-  [place] [id number] [address]  
[phone] [address] Upcoming Health Maintenance Date Due  
 GLAUCOMA SCREENING Q2Y 1/31/2009 Influenza Age 5 to Adult 8/1/2018 MEDICARE YEARLY EXAM 3/30/2019 BREAST CANCER SCRN MAMMOGRAM 12/5/2019 DTaP/Tdap/Td series (2 - Td) 9/29/2026 Allergies as of 7/25/2018  Review Complete On: 7/25/2018 By: Prema Bowen PA-C Severity Noted Reaction Type Reactions Amlodipine Medium 01/25/2016    Other (comments) Itching, Muscle cramps Clonidine  08/10/2015    Swelling Ibuprofen  01/11/2015    Swelling Levaquin [Levofloxacin]  08/10/2015    Unknown (comments) Lisinopril  04/09/2015    Angioedema Other Medication  06/29/2018    Rash, Itching Patient reports she is allergic to the PPD test for TB Pcn [Penicillins]  01/11/2015    Swelling Pravastatin  08/10/2015    Myalgia Sulfa (Sulfonamide Antibiotics)  09/04/2013    Hives Current Immunizations  Reviewed on 9/29/2016 Name Date Influenza High Dose Vaccine PF 11/30/2017, 9/29/2016, 10/15/2015 Not reviewed this visit You Were Diagnosed With   
  
 Codes Comments Hypertension, unspecified type    -  Primary ICD-10-CM: I10 
ICD-9-CM: 401.9 Bilateral renal artery stenosis (HCC)     ICD-10-CM: I70.1 ICD-9-CM: 440.1 Hypertensive left ventricular hypertrophy, without heart failure     ICD-10-CM: I11.9 ICD-9-CM: 402.90 High cholesterol     ICD-10-CM: E78.00 ICD-9-CM: 272.0 Vitals BP Pulse Resp Height(growth percentile) Weight(growth percentile) SpO2  
 136/84 (BP 1 Location: Right arm, BP Patient Position: Sitting) 75 16 5' 6\" (1.676 m) 191 lb (86.6 kg) 95% BMI OB Status Smoking Status 30.83 kg/m2 Menopause Never Smoker Vitals History BMI and BSA Data Body Mass Index Body Surface Area  
 30.83 kg/m 2 2.01 m 2 Preferred Pharmacy Pharmacy Name Phone [address] [facility] [phone] Your Updated Medication List  
  
   
This list is accurate as of 7/25/18 10:56 AM.  Always use your most recent med list.  
  
  
  
  
 acetaminophen 500 mg tablet Commonly known as:  TYLENOL Take 500 mg by mouth every six (6) hours as needed for Pain. aspirin delayed-release 81 mg tablet Take 81 mg by mouth daily. Indications: PT states stopping 7/22-7/26 for Eye Surgery  
  
 beclomethasone 80 mcg/actuation Aero Commonly known as:  QVAR Take 1 Puff by inhalation daily as needed. furosemide 40 mg tablet Commonly known as:  LASIX TAKE 1 TABLET BY MOUTH ONCE EVERY DAY  
  
 hydrALAZINE 50 mg tablet Commonly known as:  APRESOLINE  
TAKE 1 TABLET BY MOUTH THREE TIMES DAILY  
  
 irbesartan 300 mg tablet Commonly known as:  AVAPRO Take 1 Tab by mouth nightly. isosorbide mononitrate ER 60 mg CR tablet Commonly known as:  IMDUR  
TAKE 1 TABLET BY MOUTH EVERY DAY  
  
 NIFEdipine ER 30 mg ER tablet Commonly known as:  ADALAT CC  
TAKE 1 TABLET BY MOUTH ONCE DAILY potassium chloride SR 10 mEq tablet Commonly known as:  KLOR-CON 10  
TAKE 1 TABLET BY MOUTH TWICE DAILY  
  
 raNITIdine 150 mg tablet Commonly known as:  ZANTAC Take 150 mg by mouth two (2) times daily as needed. rosuvastatin 5 mg tablet Commonly known as:  CRESTOR  
TAKE 1 TABLET BY MOUTH EVERY OTHER DAY We Performed the Following AMB POC EKG ROUTINE W/ 12 LEADS, INTER & REP [50601 CPT(R)] Patient Instructions -- 6 month follow up  
-- Please see your PCP about your feeling of cold and fatigue; your most recent labs look good Heart-Healthy Diet: Care Instructions Your Care Instructions A heart-healthy diet has lots of vegetables, fruits, nuts, beans, and whole grains, and is low in salt. It limits foods that are high in saturated fat, such as meats, cheeses, and fried foods. It may be hard to change your diet, but even small changes can lower your risk of heart attack and heart disease. Follow-up care is a key part of your treatment and safety. Be sure to make and go to all appointments, and call your doctor if you are having problems. It's also a good idea to know your test results and keep a list of the medicines you take. How can you care for yourself at home? Watch your portions · Learn what a serving is. A \"serving\" and a \"portion\" are not always the same thing. Make sure that you are not eating larger portions than are recommended. For example, a serving of pasta is ½ cup. A serving size of meat is 2 to 3 ounces. A 3-ounce serving is about the size of a deck of cards. Measure serving sizes until you are good at [place]" them. Keep in mind that restaurants often serve portions that are 2 or 3 times the size of one serving. · To keep your energy level up and keep you from feeling hungry, eat often but in smaller portions. · Eat only the number of calories you need to stay at a healthy weight. If you need to lose weight, eat fewer calories than your body burns (through exercise and other physical activity). Eat more fruits and vegetables · Eat a variety of fruit and vegetables every day. Dark green, deep orange, red, or yellow fruits and vegetables are especially good for you. Examples include spinach, carrots, peaches, and berries. · Keep carrots, celery, and other veggies handy for snacks. Buy fruit that is in season and store it where you can see it so that you will be tempted to eat it. · Cook dishes that have a lot of veggies in them, such as stir-fries and soups. Limit saturated and trans fat · Read food labels, and try to avoid saturated and trans fats. They increase your risk of heart disease. Trans fat is found in many processed foods such as cookies and crackers. · Use olive or canola oil when you cook. Try cholesterol-lowering spreads, such as Benecol or Take Control. · Bake, broil, grill, or steam foods instead of frying them. · Choose lean meats instead of high-fat meats such as hot dogs and sausages. Cut off all visible fat when you prepare meat. · Eat fish, skinless poultry, and meat alternatives such as soy products instead of high-fat meats. Soy products, such as tofu, may be especially good for your heart. · Choose low-fat or fat-free milk and dairy products. Eat fish · Eat at least two servings of fish a week. Certain fish, such as salmon and tuna, contain omega-3 fatty acids, which may help reduce your risk of heart attack. Eat foods high in fiber · Eat a variety of grain products every day. Include whole-grain foods that have lots of fiber and nutrients. Examples of whole-grain foods include oats, whole wheat bread, and brown rice. · Buy whole-grain breads and cereals, instead of white bread or pastries. Limit salt and sodium · Limit how much salt and sodium you eat to help lower your blood pressure. · Taste food before you salt it. Add only a little salt when you think you need it. With time, your taste buds will adjust to less salt. · Eat fewer snack items, fast foods, and other high-salt, processed foods. Check food labels for the amount of sodium in packaged foods. · Choose low-sodium versions of canned goods (such as soups, vegetables, and beans). Limit sugar · Limit drinks and foods with added sugar. These include candy, desserts, and soda pop. Limit alcohol · Limit alcohol to no more than 2 drinks a day for men and 1 drink a day for women. Too much alcohol can cause health problems. When should you call for help? Watch closely for changes in your health, and be sure to contact your doctor if: 
  · You would like help planning heart-healthy meals. Where can you learn more? Go to http://ondinaSenior Livingtariq.info/. Enter V137 in the search box to learn more about \"Heart-Healthy Diet: Care Instructions. \" Current as of: December 6, 2017 Content Version: 11.7 © 7353-1739 [employer]. Care instructions adapted under license by [employer] (which disclaims liability or warranty for this information). If you have questions about a medical condition or this instruction, always ask your healthcare professional. [address] any warranty or liability for your use of this information. Introducing [facility]! Ginger Gary introduces Ooshot patient portal. Now you can access parts of your medical record, email your doctor's office, and request medication refills online. 1. In your internet browser, go to [email or web address] 2. Click on the First Time User? Click Here link in the Sign In box. You will see the New Member Sign Up page. 3. Enter your Ooshot Access Code exactly as it appears below. You will not need to use this code after youve completed the sign-up process.  If you do not sign up before the expiration date, you must request a new code. · [email or web address] Access Code: [email or web address] Expires: 9/26/2018  9:44 AM 
 
4. Enter the last four digits of your Social Security Number (xxxx) and Date of Birth (mm/dd/yyyy) as indicated and click Submit. You will be taken to the next sign-up page. 5. Create a iBid2Save ID. This will be your iBid2Save login ID and cannot be changed, so think of one that is secure and easy to remember. 6. Create a iBid2Save password. You can change your password at any time. 7. Enter your Password Reset Question and Answer. This can be used at a later time if you forget your password. 8. Enter your e-mail address. You will receive e-mail notification when new information is available in [address] 9. Click Sign Up. You can now view and download portions of your medical record. 10. Click the Download Summary menu link to download a portable copy of your medical information. If you have questions, please visit the Frequently Asked Questions section of the iBid2Save website. Remember, iBid2Save is NOT to be used for urgent needs. For medical emergencies, dial 911. Now available from your iPhone and Android! Please provide this summary of care documentation to your next provider. Your primary care clinician is listed as Tae SCOTT. If you have any questions after today's visit, please call [phone].

## 2018-07-25 NOTE — PATIENT INSTRUCTIONS
-- 6 month follow up  
-- Please see your PCP about your feeling of cold and fatigue; your most recent labs look good Heart-Healthy Diet: Care Instructions Your Care Instructions A heart-healthy diet has lots of vegetables, fruits, nuts, beans, and whole grains, and is low in salt. It limits foods that are high in saturated fat, such as meats, cheeses, and fried foods. It may be hard to change your diet, but even small changes can lower your risk of heart attack and heart disease. Follow-up care is a key part of your treatment and safety. Be sure to make and go to all appointments, and call your doctor if you are having problems. It's also a good idea to know your test results and keep a list of the medicines you take. How can you care for yourself at home? Watch your portions · Learn what a serving is. A \"serving\" and a \"portion\" are not always the same thing. Make sure that you are not eating larger portions than are recommended. For example, a serving of pasta is ½ cup. A serving size of meat is 2 to 3 ounces. A 3-ounce serving is about the size of a deck of cards. Measure serving sizes until you are good at [place]" them. Keep in mind that restaurants often serve portions that are 2 or 3 times the size of one serving. · To keep your energy level up and keep you from feeling hungry, eat often but in smaller portions. · Eat only the number of calories you need to stay at a healthy weight. If you need to lose weight, eat fewer calories than your body burns (through exercise and other physical activity). Eat more fruits and vegetables · Eat a variety of fruit and vegetables every day. Dark green, deep orange, red, or yellow fruits and vegetables are especially good for you. Examples include spinach, carrots, peaches, and berries. · Keep carrots, celery, and other veggies handy for snacks. Buy fruit that is in season and store it where you can see it so that you will be tempted to eat it.  
· Milena Rothman dishes that have a lot of veggies in them, such as stir-fries and soups. Limit saturated and trans fat · Read food labels, and try to avoid saturated and trans fats. They increase your risk of heart disease. Trans fat is found in many processed foods such as cookies and crackers. · Use olive or canola oil when you cook. Try cholesterol-lowering spreads, such as Benecol or Take Control. · Bake, broil, grill, or steam foods instead of frying them. · Choose lean meats instead of high-fat meats such as hot dogs and sausages. Cut off all visible fat when you prepare meat. · Eat fish, skinless poultry, and meat alternatives such as soy products instead of high-fat meats. Soy products, such as tofu, may be especially good for your heart. · Choose low-fat or fat-free milk and dairy products. Eat fish · Eat at least two servings of fish a week. Certain fish, such as salmon and tuna, contain omega-3 fatty acids, which may help reduce your risk of heart attack. Eat foods high in fiber · Eat a variety of grain products every day. Include whole-grain foods that have lots of fiber and nutrients. Examples of whole-grain foods include oats, whole wheat bread, and brown rice. · Buy whole-grain breads and cereals, instead of white bread or pastries. Limit salt and sodium · Limit how much salt and sodium you eat to help lower your blood pressure. · Taste food before you salt it. Add only a little salt when you think you need it. With time, your taste buds will adjust to less salt. · Eat fewer snack items, fast foods, and other high-salt, processed foods. Check food labels for the amount of sodium in packaged foods. · Choose low-sodium versions of canned goods (such as soups, vegetables, and beans). Limit sugar · Limit drinks and foods with added sugar. These include candy, desserts, and soda pop. Limit alcohol · Limit alcohol to no more than 2 drinks a day for men and 1 drink a day for women.  Too much alcohol can cause health problems. When should you call for help? Watch closely for changes in your health, and be sure to contact your doctor if: 
  · You would like help planning heart-healthy meals. Where can you learn more? Go to http://ondina-tariq.info/. Enter V137 in the search box to learn more about \"Heart-Healthy Diet: Care Instructions. \" Current as of: December 6, 2017 Content Version: 11.7 © 1794-8898 [employer], [place]. Care instructions adapted under license by [employer] (which disclaims liability or warranty for this information). If you have questions about a medical condition or this instruction, always ask your healthcare professional. [address] any warranty or liability for your use of this information.

## 2018-07-25 NOTE — PROGRESS NOTES
[place] [facility] [facility]   [address] [facility], [address] [place] [address] Road                                          NEW PATIENT HPI/FOLLOW-UP      NAME:  Tasha Carrillo   :   1945   MRN:   [id number]   PCP:  Mushtaq Barton MD           Subjective: The patient is a 75y.o. year old female with h/o high-cholesterol, LVH with hypertensive disease, bilateral renal artery stenosis s/p stenting , b/l carotid stenosis who returns for a routine follow-up. Since the last visit, patient reports that she had cataract surgery on right eye 2 days ago. Woke up with swelling and clear tearing/drainage today. Some foreign body sensation and vision is blurry through the tearing but otherwise, no significant pain. No itching. Has been in contact with ophthalmologist this morning. Feels cold all the time. Wearing layers in the house and turning off A/C to stay warm. She is somewhat fatigued but not out of the ordinary. Had blood work at her last visit with PCP. Started Irbesartan today. Off Valsartan. No new cardiac symptoms. Denies change in exercise tolerance, chest pain, edema, medication intolerance, palpitations, shortness of breath, PND/orthopnea wheezing, sputum, syncope, dizziness or light headedness. Doing satisfactorily. Review of Systems  General: Pt denies excessive weight gain or loss. Pt is able to conduct ADL's. Respiratory: Denies shortness of breath, EGAN, wheezing or stridor.   Cardiovascular: Denies precordial pain, palpitations, edema or PND  Gastrointestinal: Denies poor appetite, indigestion, abdominal pain or blood in stool  Peripheral vascular: Denies claudication, leg cramps  Neuropsychiatric: Denies paresthesias,tingling,numbness,anxiety,depression,fatigue  Musculoskeletal: Denies pain,tenderness, soreness,swelling      Past Medical History:   Diagnosis Date    Arthritis     hands    Asthma     Essential hypertension     GERD (gastroesophageal reflux disease)     High cholesterol     HTN (hypertension)     Ill-defined condition     hyperlipidemia    Left ventricular hypertrophy due to hypertensive disease     Renal artery stenosis [facility])      Patient Active Problem List    Diagnosis Date Noted    Carotid bruit present 12/05/2017    Bilateral carotid artery stenosis 12/05/2017    History of tubal ligation 09/14/2015    Bilateral renal artery stenosis (HCC)--s/p PTA/stenting 7/10/15 [facility] 08/09/2015    Stress at home 06/13/2015    Essential hypertension, malignant 04/26/2015     Class: Present on Admission    H/O gastroesophageal reflux (GERD) 03/12/2015    Obesity 09/04/2013    Edema of both legs--chronic venous insufficiency,amlodipine 09/04/2013    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 09/04/2013    High cholesterol     Left ventricular hypertrophy due to hypertensive disease       Past Surgical History:   Procedure Laterality Date    HX BREAST BIOPSY Left 20 years ago    negative    HX GI      open inquinal hernia repair    HX GI      6/29/18:  pt reports mulitple abdominal surgeries but unable to recall exact types    HX GYN      tubal ligation    HX HEART CATHETERIZATION  2000    normal    HX UROLOGICAL      Bilateral Renal stents.      Allergies   Allergen Reactions    Amlodipine Other (comments)     Itching,  Muscle cramps    Clonidine Swelling    Ibuprofen Swelling    Levaquin [Levofloxacin] Unknown (comments)    Lisinopril Angioedema    Other Medication Rash and Itching     Patient reports she is allergic to the PPD test for TB    Pcn [Penicillins] Swelling    Pravastatin Myalgia    Sulfa (Sulfonamide Antibiotics) Hives      Family History   Problem Relation Age of Onset    Cancer Mother     Heart Disease Mother     Heart Disease Father     Hypertension Father     Lung Disease Father     Hypertension Brother     Diabetes Brother     Heart Disease Brother     Kidney Disease Brother     Hypertension Brother       Social History Social History    Marital status:      Spouse name: N/A    Number of children: N/A    Years of education: N/A     Occupational History    Not on file. Social History Main Topics    Smoking status: Never Smoker    Smokeless tobacco: Never Used    Alcohol use 3.0 oz/week     5 Cans of beer per week      Comment: Drinks [employer] Drug use: No    Sexual activity: Not Currently     Partners: Male     Other Topics Concern    Not on file     Social History Narrative      Current Outpatient Prescriptions   Medication Sig    irbesartan (AVAPRO) 300 mg tablet Take 1 Tab by mouth nightly.  potassium chloride SR (KLOR-CON 10) 10 mEq tablet TAKE 1 TABLET BY MOUTH TWICE DAILY    NIFEdipine ER (ADALAT CC) 30 mg ER tablet TAKE 1 TABLET BY MOUTH ONCE DAILY    rosuvastatin (CRESTOR) 5 mg tablet TAKE 1 TABLET BY MOUTH EVERY OTHER DAY    isosorbide mononitrate ER (IMDUR) 60 mg CR tablet TAKE 1 TABLET BY MOUTH EVERY DAY    hydrALAZINE (APRESOLINE) 50 mg tablet TAKE 1 TABLET BY MOUTH THREE TIMES DAILY (Patient taking differently: Taking 1 tablet twice daily)    furosemide (LASIX) 40 mg tablet TAKE 1 TABLET BY MOUTH ONCE EVERY DAY    beclomethasone (QVAR) 80 mcg/actuation aero Take 1 Puff by inhalation daily as needed.  acetaminophen (TYLENOL) 500 mg tablet Take 500 mg by mouth every six (6) hours as needed for Pain.  aspirin delayed-release 81 mg tablet Take 81 mg by mouth daily. Indications: PT states stopping 7/22-7/26 for Eye Surgery    ranitidine (ZANTAC) 150 mg tablet Take 150 mg by mouth two (2) times daily as needed. No current facility-administered medications for this visit. I have reviewed the nurses notes, vitals, problem list, allergy list, medical history, family medical, social history and medications.       Objective:     Physical Exam:     Vitals:    07/25/18 1010 07/25/18 1023   BP: 144/79 136/84   Pulse: 75    Resp: 16    SpO2: 95%    Weight: 191 lb (86.6 kg) Height: 5' 6\" (1.676 m)     Body mass index is 30.83 kg/(m^2). General: WDWN adult woman, in no acute distress. Pleasant affect. HEENT: Subtle carotid bruits, no JVD, trach is midline. Right eye conjunctiva swollen with clear drainage. No TTP. EOMI. Heart:  Normal S1/S2 negative S3 or S4. Regular, no murmur, gallop or rub.   Respiratory: Clear bilaterally, no wheezing or rales  Abdomen:   Soft, non-tender, bowel sounds are active.   Extremities:  No edema, normal cap refill, no cyanosis. Neuro: A&Ox3, speech clear, gait stable. Skin: Skin color is normal. No rashes or lesions. No diaphoresis. Vascular: 2+ pulses symmetric in all extremities      Data Review:       Cardiographics:    EKG interpretation:  Rhythm: normal sinus rhythm, rare PACs. Rate (approx.): 69; Axis: normal; P wave: normal; QRS interval: non-specific intraventricular conduction delay; ST/T wave: normal. This EKG was interpreted by Meng Martin PA-C.       Cardiology Labs:    Results for orders placed or performed during the hospital encounter of 04/25/15   EKG, 12 LEAD, INITIAL   Result Value Ref Range    Ventricular Rate 84 BPM    Atrial Rate 84 BPM    P-R Interval 178 ms    QRS Duration 76 ms    Q-T Interval 358 ms    QTC Calculation (Bezet) 423 ms    Calculated P Axis 35 degrees    Calculated R Axis 7 degrees    Calculated T Axis 22 degrees    Diagnosis       Normal sinus rhythm  Nonspecific T wave abnormality  When compared with ECG of 03-FEB-2010 18:45,  No significant change  Confirmed by Basilio Carey ([id number]) on 4/26/2015 9:49:46 AM         Lab Results   Component Value Date/Time    Cholesterol, total 185 02/16/2018 09:29 AM    HDL Cholesterol 68 02/16/2018 09:29 AM    LDL, calculated 101 (H) 02/16/2018 09:29 AM    Triglyceride 79 02/16/2018 09:29 AM       Lab Results   Component Value Date/Time    Sodium 143 02/16/2018 09:29 AM    Potassium 4.6 02/16/2018 09:29 AM    Chloride 101 02/16/2018 09:29 AM    CO2 24 02/16/2018 09:29 AM    Anion gap 9 08/11/2015 04:37 AM    Glucose 103 (H) 02/16/2018 09:29 AM    BUN 24 02/16/2018 09:29 AM    Creatinine 1.15 (H) 02/16/2018 09:29 AM    BUN/Creatinine ratio 21 02/16/2018 09:29 AM    GFR est AA 54 (L) 02/16/2018 09:29 AM    GFR est non-AA 47 (L) 02/16/2018 09:29 AM    Calcium 9.5 02/16/2018 09:29 AM    Bilirubin, total 0.3 09/29/2016 12:10 PM    AST (SGOT) 17 09/29/2016 12:10 PM    Alk. phosphatase 260 (H) 09/29/2016 12:10 PM    Protein, total 7.0 09/29/2016 12:10 PM    Albumin 4.3 09/29/2016 12:10 PM    Globulin 3.8 04/25/2015 09:58 PM    A-G Ratio 1.6 09/29/2016 12:10 PM    ALT (SGPT) 11 09/29/2016 12:10 PM          Assessment:       ICD-10-CM ICD-9-CM    1. Hypertension, essential I10 401.9 AMB POC EKG ROUTINE W/ 12 LEADS, INTER & REP   2. Bilateral renal artery stenosis (HCC)--s/p PTA/stenting 7/10/15 [facility] I70.1 440.1    3. Hypertensive left ventricular hypertrophy, without heart failure I11.9 402.90    4. High cholesterol E78.00 272.0          Discussion: Patient presents at this time stable from a cardiac perspective. BP was well controlled. Has f/u with ophthalmologist tomorrow and PCP in October. Recent CBC and CMP WNL. Pleased with present cardiac status. Discussed/seen with Dr. Javi Gillespie: 1. Continue same meds. 2. Lipid profile and labs followed by PCP. 3.Encouraged to exercise to tolerance, lose weight, and follow low fat, low cholesterol, low sodium predominantly Plant-based (consider Mediterranean) diet. 4.Follow up: 6 months   --  Call with questions or concerns. -- Will follow up any test results by phone and/or f/u here in office if needed. .        I have discussed the diagnosis with the patient and the intended plan as seen in the above orders. The patient has received an after-visit summary and questions were answered concerning future plans.   I have discussed any concerning medication side effects and warnings with the patient as well.    Demetri Glasgow PA-C  7/25/2018     Patient seen and examined. All pertinent data reviewed. I have reviewed detailed note as outlined by Carine Plunkett. Case discussed with Nursing/medical assistant staff and Carine Plunkett. Patient presents cardiac stable. BP at goal following swap Valsartan to Irbesartan. Patient concerned about profuse tearing of right eye following right cataract surgery. For left cataract removal tomorrow. Advised to discuss with Ophthalmologist.   Plans as outlined.     Juani Montaño MD,FACC,[facility]

## 2018-07-26 ENCOUNTER — TELEPHONE (OUTPATIENT)
Dept: CARDIOLOGY CLINIC | Age: 73
End: 2018-07-26

## 2018-07-26 NOTE — TELEPHONE ENCOUNTER
Dr. Javi Sanches, opthalmologist called. Verified patient with two patient identifiers. Asking if pt is cleared for eye surgery, states pt advised him we advised postponing surgery. Per Dr. Cipriano Jasmine, pt is cleared for eye surgery from a cardiac standpoint. States pt had concerns regarding proceeding with surgery. Was advised by Dr. Alonso Ervin to discuss with Dr. Javi Sanches and they should make any decision regarding proceeding with surgery. Spoke with Floridalma Ramsey in Dr. Yesica Vasquez office, ph # [phone]. She verbalized understanding.

## 2018-08-11 ENCOUNTER — HOSPITAL ENCOUNTER (EMERGENCY)
Age: 73
Discharge: HOME | End: 2018-08-11
Attending: EMERGENCY MEDICINE
Payer: MEDICARE

## 2018-08-11 ENCOUNTER — APPOINTMENT (OUTPATIENT)
Dept: GENERAL RADIOLOGY | Age: 73
End: 2018-08-11
Attending: EMERGENCY MEDICINE
Payer: MEDICARE

## 2018-08-11 VITALS
OXYGEN SATURATION: 100 % | DIASTOLIC BLOOD PRESSURE: 64 MMHG | SYSTOLIC BLOOD PRESSURE: 121 MMHG | BODY MASS INDEX: 29.97 KG/M2 | RESPIRATION RATE: 16 BRPM | TEMPERATURE: 98.2 F | HEART RATE: 64 BPM | HEIGHT: 67 IN | WEIGHT: 190.92 LBS

## 2018-08-11 DIAGNOSIS — R07.89 ATYPICAL CHEST PAIN: Primary | ICD-10-CM

## 2018-08-11 DIAGNOSIS — B02.8 HERPES ZOSTER WITH COMPLICATION: ICD-10-CM

## 2018-08-11 LAB
ALBUMIN SERPL-MCNC: 3.8 G/DL (ref 3.5–5)
ALBUMIN/GLOB SERPL: 1 {RATIO} (ref 1.1–2.2)
ALP SERPL-CCNC: 312 U/L (ref 45–117)
ALT SERPL-CCNC: 18 U/L (ref 12–78)
ANION GAP SERPL CALC-SCNC: 10 MMOL/L (ref 5–15)
AST SERPL-CCNC: 24 U/L (ref 15–37)
BASOPHILS # BLD: 0 K/UL (ref 0–0.1)
BASOPHILS NFR BLD: 1 % (ref 0–1)
BILIRUB SERPL-MCNC: 0.7 MG/DL (ref 0.2–1)
BUN SERPL-MCNC: 22 MG/DL (ref 6–20)
BUN/CREAT SERPL: 17 (ref 12–20)
CALCIUM SERPL-MCNC: 8.9 MG/DL (ref 8.5–10.1)
CHLORIDE SERPL-SCNC: 101 MMOL/L (ref 97–108)
CK SERPL-CCNC: 172 U/L (ref 26–192)
CO2 SERPL-SCNC: 22 MMOL/L (ref 21–32)
CREAT SERPL-MCNC: 1.28 MG/DL (ref 0.55–1.02)
DIFFERENTIAL METHOD BLD: ABNORMAL
EOSINOPHIL # BLD: 0.1 K/UL (ref 0–0.4)
EOSINOPHIL NFR BLD: 2 % (ref 0–7)
ERYTHROCYTE [DISTWIDTH] IN BLOOD BY AUTOMATED COUNT: 12.2 % (ref 11.5–14.5)
GLOBULIN SER CALC-MCNC: 3.7 G/DL (ref 2–4)
GLUCOSE SERPL-MCNC: 112 MG/DL (ref 65–100)
HCT VFR BLD AUTO: 33.9 % (ref 35–47)
HGB BLD-MCNC: 11.6 G/DL (ref 11.5–16)
IMM GRANULOCYTES # BLD: 0 K/UL (ref 0–0.04)
IMM GRANULOCYTES NFR BLD AUTO: 0 % (ref 0–0.5)
LYMPHOCYTES # BLD: 1.9 K/UL (ref 0.8–3.5)
LYMPHOCYTES NFR BLD: 38 % (ref 12–49)
MCH RBC QN AUTO: 30.9 PG (ref 26–34)
MCHC RBC AUTO-ENTMCNC: 34.2 G/DL (ref 30–36.5)
MCV RBC AUTO: 90.4 FL (ref 80–99)
MONOCYTES # BLD: 0.5 K/UL (ref 0–1)
MONOCYTES NFR BLD: 10 % (ref 5–13)
NEUTS SEG # BLD: 2.6 K/UL (ref 1.8–8)
NEUTS SEG NFR BLD: 50 % (ref 32–75)
NRBC # BLD: 0 K/UL (ref 0–0.01)
NRBC BLD-RTO: 0 PER 100 WBC
PLATELET # BLD AUTO: 263 K/UL (ref 150–400)
PMV BLD AUTO: 10.2 FL (ref 8.9–12.9)
POTASSIUM SERPL-SCNC: 4.6 MMOL/L (ref 3.5–5.1)
PROT SERPL-MCNC: 7.5 G/DL (ref 6.4–8.2)
RBC # BLD AUTO: 3.75 M/UL (ref 3.8–5.2)
SODIUM SERPL-SCNC: 133 MMOL/L (ref 136–145)
TROPONIN I SERPL-MCNC: <0.05 NG/ML
WBC # BLD AUTO: 5.1 K/UL (ref 3.6–11)

## 2018-08-11 PROCEDURE — 93005 ELECTROCARDIOGRAM TRACING: CPT

## 2018-08-11 PROCEDURE — 74011250636 HC RX REV CODE- 250/636: Performed by: EMERGENCY MEDICINE

## 2018-08-11 PROCEDURE — 96374 THER/PROPH/DIAG INJ IV PUSH: CPT

## 2018-08-11 PROCEDURE — 80053 COMPREHEN METABOLIC PANEL: CPT | Performed by: EMERGENCY MEDICINE

## 2018-08-11 PROCEDURE — 82550 ASSAY OF CK (CPK): CPT | Performed by: EMERGENCY MEDICINE

## 2018-08-11 PROCEDURE — 96375 TX/PRO/DX INJ NEW DRUG ADDON: CPT

## 2018-08-11 PROCEDURE — 36415 COLL VENOUS BLD VENIPUNCTURE: CPT | Performed by: EMERGENCY MEDICINE

## 2018-08-11 PROCEDURE — 84484 ASSAY OF TROPONIN QUANT: CPT | Performed by: EMERGENCY MEDICINE

## 2018-08-11 PROCEDURE — 71045 X-RAY EXAM CHEST 1 VIEW: CPT

## 2018-08-11 PROCEDURE — 85025 COMPLETE CBC W/AUTO DIFF WBC: CPT | Performed by: EMERGENCY MEDICINE

## 2018-08-11 PROCEDURE — 99284 EMERGENCY DEPT VISIT MOD MDM: CPT

## 2018-08-11 RX ORDER — MORPHINE SULFATE 4 MG/ML
4 INJECTION INTRAVENOUS
Status: COMPLETED | OUTPATIENT
Start: 2018-08-11 | End: 2018-08-11

## 2018-08-11 RX ORDER — METHYLPREDNISOLONE 4 MG/1
TABLET ORAL
Qty: 1 DOSE PACK | Refills: 0 | Status: SHIPPED | OUTPATIENT
Start: 2018-08-11 | End: 2018-09-11

## 2018-08-11 RX ORDER — VALACYCLOVIR HYDROCHLORIDE 1 G/1
1000 TABLET, FILM COATED ORAL 3 TIMES DAILY
Qty: 21 TAB | Refills: 0 | Status: SHIPPED | OUTPATIENT
Start: 2018-08-11 | End: 2018-08-18

## 2018-08-11 RX ORDER — HYDROCODONE BITARTRATE AND ACETAMINOPHEN 5; 325 MG/1; MG/1
1 TABLET ORAL
Qty: 15 TAB | Refills: 0 | Status: SHIPPED | OUTPATIENT
Start: 2018-08-11 | End: 2018-09-11

## 2018-08-11 RX ORDER — ONDANSETRON 2 MG/ML
4 INJECTION INTRAMUSCULAR; INTRAVENOUS
Status: COMPLETED | OUTPATIENT
Start: 2018-08-11 | End: 2018-08-11

## 2018-08-11 RX ADMIN — ONDANSETRON 4 MG: 2 INJECTION, SOLUTION INTRAMUSCULAR; INTRAVENOUS at 09:46

## 2018-08-11 RX ADMIN — MORPHINE SULFATE 4 MG: 4 INJECTION INTRAVENOUS at 09:46

## 2018-08-11 NOTE — DISCHARGE INSTRUCTIONS
Chest Pain: Care Instructions  Your Care Instructions    There are many things that can cause chest pain. Some are not serious and will get better on their own in a few days. But some kinds of chest pain need more testing and treatment. Your doctor may have recommended a follow-up visit in the next 8 to 12 hours. If you are not getting better, you may need more tests or treatment. Even though your doctor has released you, you still need to watch for any problems. The doctor carefully checked you, but sometimes problems can develop later. If you have new symptoms or if your symptoms do not get better, get medical care right away. If you have worse or different chest pain or pressure that lasts more than 5 minutes or you passed out (lost consciousness), call 911 or seek other emergency help right away. A medical visit is only one step in your treatment. Even if you feel better, you still need to do what your doctor recommends, such as going to all suggested follow-up appointments and taking medicines exactly as directed. This will help you recover and help prevent future problems. How can you care for yourself at home? · Rest until you feel better. · Take your medicine exactly as prescribed. Call your doctor if you think you are having a problem with your medicine. · Do not drive after taking a prescription pain medicine. When should you call for help? Call 911 if:    · You passed out (lost consciousness).     · You have severe difficulty breathing.     · You have symptoms of a heart attack. These may include:  ¨ Chest pain or pressure, or a strange feeling in your chest.  ¨ Sweating. ¨ Shortness of breath. ¨ Nausea or vomiting. ¨ Pain, pressure, or a strange feeling in your back, neck, jaw, or upper belly or in one or both shoulders or arms. ¨ Lightheadedness or sudden weakness. ¨ A fast or irregular heartbeat.   After you call 911, the  may tell you to chew 1 adult-strength or 2 to 4 low-dose aspirin. Wait for an ambulance. Do not try to drive yourself.    Call your doctor today if:    · You have any trouble breathing.     · Your chest pain gets worse.     · You are dizzy or lightheaded, or you feel like you may faint.     · You are not getting better as expected.     · You are having new or different chest pain. Where can you learn more? Go to http://ondina-tariq.info/. Enter A120 in the search box to learn more about \"Chest Pain: Care Instructions. \"  Current as of: November 20, 2017  Content Version: 11.7  © 1365-3199 [employer]. Care instructions adapted under license by [employer] (which disclaims liability or warranty for this information). If you have questions about a medical condition or this instruction, always ask your healthcare professional. [address] any warranty or liability for your use of this information. Shingles: Care Instructions  Your Care Instructions    Shingles (herpes zoster) causes pain and a blistered rash. The rash can appear anywhere on the body but will be on only one side of the body, the left or right. It will be in a band, a strip, or a small area. The pain can be very severe. Shingles can also cause tingling or itching in the area of the rash. The blisters scab over after a few days and heal in 2 to 4 weeks. Medicines can help you feel better and may help prevent more serious problems caused by shingles. Shingles is caused by the same virus that causes chickenpox. When you have chickenpox, the virus gets into your nerve roots and stays there (becomes dormant) long after you get over the chickenpox. If the virus becomes active again, it can cause shingles. Follow-up care is a key part of your treatment and safety. Be sure to make and go to all appointments, and call your doctor if you are having problems.  It's also a good idea to know your test results and keep a list of the medicines you take. How can you care for yourself at home? · Be safe with medicines. Take your medicines exactly as prescribed. Call your doctor if you think you are having a problem with your medicine. Antiviral medicine helps you get better faster. · Try not to scratch or pick at the blisters. They will crust over and fall off on their own if you leave them alone. · Put cool, wet cloths on the area to relieve pain and itching. You can also use calamine lotion. Try not to use so much lotion that it cakes and is hard to get off. · Put cornstarch or baking soda on the sores to help dry them out so they heal faster. · Do not use thick ointment, such as petroleum jelly, on the sores. This will keep them from drying and healing. · To help remove loose crusts, soak them in tap water. This can help decrease oozing, and dry and soothe the skin. · Take an over-the-counter pain medicine, such as acetaminophen (Tylenol), ibuprofen (Advil, Motrin), or naproxen (Aleve). Read and follow all instructions on the label. · Avoid close contact with people until the blisters have healed. It is very important for you to avoid contact with anyone who has never had chickenpox or the chickenpox vaccine. Pregnant women, young babies, and anyone else who has a hard time fighting infection (such as someone with HIV, diabetes, or cancer) is especially at risk. When should you call for help?   Call your doctor now or seek immediate medical care if:    · You have a new or higher fever.     · You have a severe headache and a stiff neck.     · You lose the ability to think clearly.     · The rash spreads to your forehead, nose, eyes, or eyelids.     · You have eye pain, or your vision gets worse.     · You have new pain in your face, or you cannot move the muscles in your face.     · Blisters spread to new parts of your body.    Watch closely for changes in your health, and be sure to contact your doctor if:    · The rash has not healed after 2 to 4 weeks.     · You still have pain after the rash has healed. Where can you learn more? Go to http://ondina-tariq.info/. Thomas Hutchins in the search box to learn more about \"Shingles: Care Instructions. \"  Current as of: November 18, 2017  Content Version: 11.7  © 1234-9350 [employer]. Care instructions adapted under license by [employer] (which disclaims liability or warranty for this information). If you have questions about a medical condition or this instruction, always ask your healthcare professional. Ron Lozada any warranty or liability for your use of this information.

## 2018-08-11 NOTE — ED NOTES
Discharge instructions reviewed with patient, copy given by Dr. Elizabeth Isidro . Pt is accomponied by family, denies use of wheelchair.

## 2018-08-11 NOTE — ED PROVIDER NOTES
EMERGENCY DEPARTMENT HISTORY AND PHYSICAL EXAM      Date: 8/11/2018  Patient Name: Charlotte Fonseca    History of Presenting Illness     Chief Complaint   Patient presents with    Chest Pain     pt reports pain started to mid back 1 week ago and has now moved to L breast and chest area    Rash     pt states to back onset today, pt BP medications recently switched and pt concerned for allergic reaction       History Provided By: Patient    HPI: Charlotte Fonseca, 75 y.o. female with PMHx significant for HTN, left ventricular hypertrophy due to hypertensive disease, presents ambulatory to the ED with cc of itchy rash in middle of left back that onset today. She reports back pain x 1 week that now radiates through to left breast. Pt describes the pain as feeling like her \"ribs are caving in.\" She states pain is worse when she lifts up her left breast. She states that she did not have any rash yesterday. Pt notes a recent medication change, reporting that her 325 mg valsartan was changed to 300 mg olmesartan. She reports increased stress lately. Pt denies nausea or diaphoresis. She denies PMHx of DM. There are no other complaints, changes, or physical findings at this time. PCP: Candace Dobbs MD    Current Outpatient Prescriptions   Medication Sig Dispense Refill    methylPREDNISolone (MEDROL, NANCY,) 4 mg tablet Take as directed 1 Dose Pack 0    valACYclovir (VALTREX) 1 gram tablet Take 1 Tab by mouth three (3) times daily for 7 days. 21 Tab 0    HYDROcodone-acetaminophen (NORCO) 5-325 mg per tablet Take 1 Tab by mouth every four (4) hours as needed for Pain. Max Daily Amount: 6 Tabs. 15 Tab 0    irbesartan (AVAPRO) 300 mg tablet Take 1 Tab by mouth nightly.  30 Tab 6    potassium chloride SR (KLOR-CON 10) 10 mEq tablet TAKE 1 TABLET BY MOUTH TWICE DAILY 60 Tab 6    NIFEdipine ER (ADALAT CC) 30 mg ER tablet TAKE 1 TABLET BY MOUTH ONCE DAILY 30 Tab 6    rosuvastatin (CRESTOR) 5 mg tablet TAKE 1 TABLET BY MOUTH EVERY OTHER DAY 15 Tab 11    isosorbide mononitrate ER (IMDUR) 60 mg CR tablet TAKE 1 TABLET BY MOUTH EVERY DAY 30 Tab 6    hydrALAZINE (APRESOLINE) 50 mg tablet TAKE 1 TABLET BY MOUTH THREE TIMES DAILY (Patient taking differently: Taking 1 tablet twice daily) 90 Tab 3    furosemide (LASIX) 40 mg tablet TAKE 1 TABLET BY MOUTH ONCE EVERY DAY 30 Tab 6    beclomethasone (QVAR) 80 mcg/actuation aero Take 1 Puff by inhalation daily as needed. 1 Inhaler 11    acetaminophen (TYLENOL) 500 mg tablet Take 500 mg by mouth every six (6) hours as needed for Pain.  aspirin delayed-release 81 mg tablet Take 81 mg by mouth daily. Indications: PT states stopping 7/22-7/26 for Eye Surgery      ranitidine (ZANTAC) 150 mg tablet Take 150 mg by mouth two (2) times daily as needed. Past History     Past Medical History:  Past Medical History:   Diagnosis Date    Arthritis     hands    Asthma     Essential hypertension     GERD (gastroesophageal reflux disease)     High cholesterol     HTN (hypertension)     Ill-defined condition     hyperlipidemia    Left ventricular hypertrophy due to hypertensive disease     Renal artery stenosis (HCC)        Past Surgical History:  Past Surgical History:   Procedure Laterality Date    HX BREAST BIOPSY Left 20 years ago    negative    HX GI      open inquinal hernia repair    HX GI      6/29/18:  pt reports mulitple abdominal surgeries but unable to recall exact types    HX GYN      tubal ligation    HX HEART CATHETERIZATION  2000    normal    HX UROLOGICAL      Bilateral Renal stents.        Family History:  Family History   Problem Relation Age of Onset    Cancer Mother     Heart Disease Mother     Heart Disease Father     Hypertension Father     Lung Disease Father     Hypertension Brother     Diabetes Brother     Heart Disease Brother     Kidney Disease Brother     Hypertension Brother        Social History:  Social History   Substance Use Topics    Smoking status: Never Smoker    Smokeless tobacco: Never Used    Alcohol use 3.0 oz/week     5 Cans of beer per week      Comment: Drinks [employer]       Allergies: Allergies   Allergen Reactions    Amlodipine Other (comments)     Itching,  Muscle cramps    Clonidine Swelling    Ibuprofen Swelling    Levaquin [Levofloxacin] Unknown (comments)    Lisinopril Angioedema    Other Medication Rash and Itching     Patient reports she is allergic to the PPD test for TB    Pcn [Penicillins] Swelling    Pravastatin Myalgia    Sulfa (Sulfonamide Antibiotics) Hives         Review of Systems   Review of Systems   Constitutional: Negative for activity change, chills, diaphoresis and fever. HENT: Negative for congestion and sore throat. Eyes: Negative for pain and redness. Respiratory: Negative for cough, chest tightness and shortness of breath. Cardiovascular: Positive for chest pain (left breast pain). Negative for palpitations. Gastrointestinal: Negative for abdominal pain, diarrhea, nausea and vomiting. Genitourinary: Negative for dysuria, frequency and urgency. Musculoskeletal: Positive for back pain. Negative for neck pain. Skin: Positive for rash (mid left back). Neurological: Negative for syncope, light-headedness and headaches. Psychiatric/Behavioral: Negative for confusion. All other systems reviewed and are negative. Physical Exam   Physical Exam   Constitutional: She is oriented to person, place, and time. She appears well-developed and well-nourished. No distress. HENT:   Head: Normocephalic. Nose: Nose normal.   Mouth/Throat: Oropharynx is clear and moist. No oropharyngeal exudate. Eyes: Conjunctivae are normal. Pupils are equal, round, and reactive to light. No scleral icterus. Neck: Normal range of motion. Neck supple. No JVD present. No tracheal deviation present. No thyromegaly present. Cardiovascular: Normal rate, regular rhythm and intact distal pulses.   Exam reveals no gallop and no friction rub. No murmur heard. Pulmonary/Chest: Effort normal and breath sounds normal. No stridor. No respiratory distress. She has no wheezes. She has no rales. Abdominal: Soft. Bowel sounds are normal. She exhibits no distension. There is no tenderness. There is no rebound and no guarding. Musculoskeletal: Normal range of motion. She exhibits no edema. Lymphadenopathy:     She has no cervical adenopathy. Neurological: She is alert and oriented to person, place, and time. No cranial nerve deficit. She exhibits normal muscle tone. Coordination normal.   Skin: Skin is warm and dry. She is not diaphoretic. No erythema. Ethel fascicular rash in left thoracic area, T5 distribution. Psychiatric: She has a normal mood and affect. Her behavior is normal.   Nursing note and vitals reviewed.       Diagnostic Study Results     Labs -     Recent Results (from the past 12 hour(s))   EKG, 12 LEAD, INITIAL    Collection Time: 08/11/18  9:02 AM   Result Value Ref Range    Ventricular Rate 72 BPM    Atrial Rate 72 BPM    P-R Interval 188 ms    QRS Duration 72 ms    Q-T Interval 390 ms    QTC Calculation (Bezet) 427 ms    Calculated P Axis 55 degrees    Calculated R Axis 11 degrees    Calculated T Axis 39 degrees    Diagnosis       Normal sinus rhythm  Nonspecific T wave abnormality  When compared with ECG of 25-APR-2015 22:53,  T wave inversion no longer evident in Inferior leads  Nonspecific T wave abnormality, improved in Lateral leads     CBC WITH AUTOMATED DIFF    Collection Time: 08/11/18  9:21 AM   Result Value Ref Range    WBC 5.1 3.6 - 11.0 K/uL    RBC 3.75 (L) 3.80 - 5.20 M/uL    HGB 11.6 11.5 - 16.0 g/dL    HCT 33.9 (L) 35.0 - 47.0 %    MCV 90.4 80.0 - 99.0 FL    MCH 30.9 26.0 - 34.0 PG    MCHC 34.2 30.0 - 36.5 g/dL    RDW 12.2 11.5 - 14.5 %    PLATELET 182 909 - 135 K/uL    MPV 10.2 8.9 - 12.9 FL    NRBC 0.0 0  WBC    ABSOLUTE NRBC 0.00 0.00 - 0.01 K/uL    NEUTROPHILS 50 32 - 75 %    LYMPHOCYTES 38 12 - 49 %    MONOCYTES 10 5 - 13 %    EOSINOPHILS 2 0 - 7 %    BASOPHILS 1 0 - 1 %    IMMATURE GRANULOCYTES 0 0.0 - 0.5 %    ABS. NEUTROPHILS 2.6 1.8 - 8.0 K/UL    ABS. LYMPHOCYTES 1.9 0.8 - 3.5 K/UL    ABS. MONOCYTES 0.5 0.0 - 1.0 K/UL    ABS. EOSINOPHILS 0.1 0.0 - 0.4 K/UL    ABS. BASOPHILS 0.0 0.0 - 0.1 K/UL    ABS. IMM. GRANS. 0.0 0.00 - 0.04 K/UL    DF AUTOMATED     METABOLIC PANEL, COMPREHENSIVE    Collection Time: 08/11/18  9:21 AM   Result Value Ref Range    Sodium 133 (L) 136 - 145 mmol/L    Potassium 4.6 3.5 - 5.1 mmol/L    Chloride 101 97 - 108 mmol/L    CO2 22 21 - 32 mmol/L    Anion gap 10 5 - 15 mmol/L    Glucose 112 (H) 65 - 100 mg/dL    BUN 22 (H) 6 - 20 MG/DL    Creatinine 1.28 (H) 0.55 - 1.02 MG/DL    BUN/Creatinine ratio 17 12 - 20      GFR est AA 49 (L) >60 ml/min/1.73m2    GFR est non-AA 41 (L) >60 ml/min/1.73m2    Calcium 8.9 8.5 - 10.1 MG/DL    Bilirubin, total 0.7 0.2 - 1.0 MG/DL    ALT (SGPT) 18 12 - 78 U/L    AST (SGOT) 24 15 - 37 U/L    Alk. phosphatase 312 (H) 45 - 117 U/L    Protein, total 7.5 6.4 - 8.2 g/dL    Albumin 3.8 3.5 - 5.0 g/dL    Globulin 3.7 2.0 - 4.0 g/dL    A-G Ratio 1.0 (L) 1.1 - 2.2     CK W/ REFLX CKMB    Collection Time: 08/11/18  9:21 AM   Result Value Ref Range     26 - 192 U/L   TROPONIN I    Collection Time: 08/11/18  9:21 AM   Result Value Ref Range    Troponin-I, Qt. <0.05 <0.05 ng/mL       Radiologic Studies -   XR CHEST PORT   Final Result        CXR Results  (Last 48 hours)               08/11/18 0938  XR CHEST PORT Final result    Impression:  IMPRESSION:    Clear lungs. Narrative:  PORTABLE CHEST RADIOGRAPH/S: 8/11/2018 9:38 AM       INDICATION: Chest pain radiating from the mid back to the left breast, starting   one week ago. Rash. COMPARISON: 4/25/2015, 2/3/2010. TECHNIQUE: Portable frontal semiupright radiograph/s of the chest.       FINDINGS:    The lungs are clear. The central airways are patent.  No pneumothorax or pleural   effusion. Bilateral chronic lacunar osteoarthritis. Medical Decision Making   I am the first provider for this patient. I reviewed the vital signs, available nursing notes, past medical history, past surgical history, family history and social history. Vital Signs-Reviewed the patient's vital signs. Patient Vitals for the past 12 hrs:   Temp Pulse Resp BP SpO2   08/11/18 0945 - 64 14 140/67 100 %   08/11/18 0849 98.2 °F (36.8 °C) 77 16 178/79 100 %       ED EKG interpretation: 09:02  Rhythm: normal sinus rhythm; and regular . Rate (approx.): 72; Axis: normal; [place] Interval: normal; QRS interval: normal ; ST/T wave: non-specific changes; This EKG was interpreted by Lilian Mcpherson MD,ED Provider. Records Reviewed: Nursing Notes and Old Medical Records    Provider Notes (Medical Decision Making):   DDx: shingles, ACS    ED Course:   Initial assessment performed. The patients presenting problems have been discussed, and they are in agreement with the care plan formulated and outlined with them. I have encouraged them to ask questions as they arise throughout their visit. 10:20 AM  Suspect CP related to early shingles. Negative troponin, clear CXR. Discharge home. Disposition:  Discharge Note:  10:27 AM  The pt is ready for discharge. The pt's signs, symptoms, diagnosis, and discharge instructions have been discussed and pt has conveyed their understanding. The pt is to follow up as recommended or return to ER should their symptoms worsen. Plan has been discussed and pt is in agreement. PLAN:  1. Current Discharge Medication List      START taking these medications    Details   methylPREDNISolone (MEDROL, NANCY,) 4 mg tablet Take as directed  Qty: 1 Dose Pack, Refills: 0      valACYclovir (VALTREX) 1 gram tablet Take 1 Tab by mouth three (3) times daily for 7 days.   Qty: 21 Tab, Refills: 0      HYDROcodone-acetaminophen (NORCO) 5-325 mg per tablet Take 1 Tab by mouth every four (4) hours as needed for Pain. Max Daily Amount: 6 Tabs. Qty: 15 Tab, Refills: 0    Associated Diagnoses: Herpes zoster with complication           2. Follow-up Information     Follow up With Details Comments Contact Info    Marylene Prim, MD Schedule an appointment as soon as possible for a visit in 1 week  [address] [facility]  [phone]      [facility] [place] EM[facility] DE[facility] Go in 1 day If symptoms worsen [address] [place] [address]  [phone]        Return to ED if worse     Diagnosis     Clinical Impression:   1. Atypical chest pain    2. Herpes zoster with complication        Attestations: This note is prepared by Cyrus Stockton, acting as a Scribe for MD Moise Chung MD: The scribe's documentation has been prepared under my direction and personally reviewed by me in its entirety. I confirm that the notes above accurately reflects all work, treatment, procedures, and medical decision making performed by me.

## 2018-08-12 LAB
ATRIAL RATE: 72 BPM
CALCULATED P AXIS, ECG09: 55 DEGREES
CALCULATED R AXIS, ECG10: 11 DEGREES
CALCULATED T AXIS, ECG11: 39 DEGREES
DIAGNOSIS, 93000: NORMAL
P-R INTERVAL, ECG05: 188 MS
Q-T INTERVAL, ECG07: 390 MS
QRS DURATION, ECG06: 72 MS
QTC CALCULATION (BEZET), ECG08: 427 MS
VENTRICULAR RATE, ECG03: 72 BPM

## 2018-08-13 ENCOUNTER — TELEPHONE (OUTPATIENT)
Dept: INTERNAL MEDICINE CLINIC | Age: 73
End: 2018-08-13

## 2018-08-13 NOTE — TELEPHONE ENCOUNTER
Pt called to schedule a ER follow up appt for shingles.  Pt best contact number is [phone].              Copy/paste [facility]

## 2018-08-14 NOTE — TELEPHONE ENCOUNTER
Spoke with patient after 2 patient identifiers being note and advised of appt on Monday, August 20, 2018 11:45 AM, patient accepted. Patient expressed understanding and has no further questions at this time.

## 2018-08-19 NOTE — PROGRESS NOTES
HISTORY OF PRESENT ILLNESS  Marquez Neely is a 75 y.o. female. HPI   Er f/u from 8-11-18 wit left back and left chest /breast pain with rash  Dx zoster-treated with valtrex and medrol and norco  cxr clear, troponin negative. Pain dorys 5-6/10 now per pt          Last OV   f/u htn hld carotid artery stenosis-mild   Now on crestor 5mg every other day, tolerating- last month  Taking hydralazine only 1 every day  bp was6/76 today at home per pt  cologuard was negative       Patient Active Problem List    Diagnosis Date Noted    Carotid bruit present 12/05/2017    Bilateral carotid artery stenosis 12/05/2017    History of tubal ligation 09/14/2015    Bilateral renal artery stenosis (HCC)--s/p PTA/stenting 7/10/15 [id number] 08/09/2015    Stress at home 06/13/2015    Essential hypertension, malignant 04/26/2015     Class: Present on Admission    H/O gastroesophageal reflux (GERD) 03/12/2015    Obesity 09/04/2013    Edema of both legs--chronic venous insufficiency,amlodipine 09/04/2013    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 09/04/2013    High cholesterol     Left ventricular hypertrophy due to hypertensive disease      Current Outpatient Prescriptions   Medication Sig Dispense Refill    methylPREDNISolone (MEDROL, NANCY,) 4 mg tablet Take as directed 1 Dose Pack 0    HYDROcodone-acetaminophen (NORCO) 5-325 mg per tablet Take 1 Tab by mouth every four (4) hours as needed for Pain. Max Daily Amount: 6 Tabs. 15 Tab 0    irbesartan (AVAPRO) 300 mg tablet Take 1 Tab by mouth nightly.  30 Tab 6    potassium chloride SR (KLOR-CON 10) 10 mEq tablet TAKE 1 TABLET BY MOUTH TWICE DAILY 60 Tab 6    NIFEdipine ER (ADALAT CC) 30 mg ER tablet TAKE 1 TABLET BY MOUTH ONCE DAILY 30 Tab 6    rosuvastatin (CRESTOR) 5 mg tablet TAKE 1 TABLET BY MOUTH EVERY OTHER DAY 15 Tab 11    isosorbide mononitrate ER (IMDUR) 60 mg CR tablet TAKE 1 TABLET BY MOUTH EVERY DAY 30 Tab 6    hydrALAZINE (APRESOLINE) 50 mg tablet TAKE 1 TABLET BY MOUTH THREE TIMES DAILY (Patient taking differently: Taking 1 tablet twice daily) 90 Tab 3    furosemide (LASIX) 40 mg tablet TAKE 1 TABLET BY MOUTH ONCE EVERY DAY 30 Tab 6    beclomethasone (QVAR) 80 mcg/actuation aero Take 1 Puff by inhalation daily as needed. 1 Inhaler 11    acetaminophen (TYLENOL) 500 mg tablet Take 500 mg by mouth every six (6) hours as needed for Pain.  aspirin delayed-release 81 mg tablet Take 81 mg by mouth daily. Indications: PT states stopping 7/22-7/26 for Eye Surgery      ranitidine (ZANTAC) 150 mg tablet Take 150 mg by mouth two (2) times daily as needed.        Allergies   Allergen Reactions    Amlodipine Other (comments)     Itching,  Muscle cramps    Clonidine Swelling    Ibuprofen Swelling    Levaquin [Levofloxacin] Unknown (comments)    Lisinopril Angioedema    Other Medication Rash and Itching     Patient reports she is allergic to the PPD test for TB    Pcn [Penicillins] Swelling    Pravastatin Myalgia    Sulfa (Sulfonamide Antibiotics) Hives      Lab Results  Component Value Date/Time   Glucose 112 (H) 08/11/2018 09:21 AM   Glucose (POC) 96 04/27/2015 09:57 PM   Microalb/Creat ratio (ug/mg creat.) 62.5 (H) 04/02/2015 09:06 AM   LDL, calculated 101 (H) 02/16/2018 09:29 AM   Creatinine 1.28 (H) 08/11/2018 09:21 AM      Lab Results  Component Value Date/Time   Cholesterol, total 185 02/16/2018 09:29 AM   HDL Cholesterol 68 02/16/2018 09:29 AM   LDL, calculated 101 (H) 02/16/2018 09:29 AM   Triglyceride 79 02/16/2018 09:29 AM     Lab Results  Component Value Date/Time   GFR est non-AA 41 (L) 08/11/2018 09:21 AM   GFR est AA 49 (L) 08/11/2018 09:21 AM   Creatinine 1.28 (H) 08/11/2018 09:21 AM   BUN 22 (H) 08/11/2018 09:21 AM   Sodium 133 (L) 08/11/2018 09:21 AM   Potassium 4.6 08/11/2018 09:21 AM   Chloride 101 08/11/2018 09:21 AM   CO2 22 08/11/2018 09:21 AM   Magnesium 1.7 02/07/2010 03:30 AM        ROS    Physical Exam   Constitutional: She appears well-developed and well-nourished. Appears stated age   Cardiovascular: Normal rate, regular rhythm and normal heart sounds. Exam reveals no gallop and no friction rub. No murmur heard. Pulmonary/Chest: Effort normal and breath sounds normal. No respiratory distress. She has no wheezes. Abdominal: Soft. Bowel sounds are normal.   Musculoskeletal: She exhibits no edema. Neurological: She is alert. Skin: Rash noted. Psychiatric: She has a normal mood and affect. Nursing note and vitals reviewed. ASSESSMENT and PLAN  Diagnoses and all orders for this visit:    1. Herpes zoster without complication  -     oxyCODONE-acetaminophen (PERCOCET) 5-325 mg per tablet; Take 1 Tab by mouth every eight (8) hours as needed for Pain. Max Daily Amount: 3 Tabs.    percocet 5/325 mg 15 tabs/nr   Completed valtrex   Will consider shingrix in near future   Reference handout was given to pt on shingles    Follow-up Disposition: Not on File

## 2018-08-20 ENCOUNTER — OFFICE VISIT (OUTPATIENT)
Dept: INTERNAL MEDICINE CLINIC | Age: 73
End: 2018-08-20

## 2018-08-20 VITALS
RESPIRATION RATE: 20 BRPM | OXYGEN SATURATION: 99 % | HEART RATE: 67 BPM | HEIGHT: 67 IN | BODY MASS INDEX: 29.66 KG/M2 | WEIGHT: 189 LBS | SYSTOLIC BLOOD PRESSURE: 168 MMHG | TEMPERATURE: 97.9 F | DIASTOLIC BLOOD PRESSURE: 72 MMHG

## 2018-08-20 DIAGNOSIS — B02.9 HERPES ZOSTER WITHOUT COMPLICATION: Primary | ICD-10-CM

## 2018-08-20 RX ORDER — OXYCODONE AND ACETAMINOPHEN 5; 325 MG/1; MG/1
1 TABLET ORAL
Qty: 15 TAB | Refills: 0 | Status: SHIPPED | OUTPATIENT
Start: 2018-08-20 | End: 2018-08-28 | Stop reason: SDUPTHER

## 2018-08-20 NOTE — PROGRESS NOTES
Chief Complaint   Patient presents with   [facility] Follow Up     8/11/18 ED visit for Shingles     1. Have you been to the ER, urgent care clinic since your last visit? [address] [facility]  Hospitalized since your last visit? No      2. Have you seen or consulted any other health care providers outside of the [facility] since your last visit?    No

## 2018-08-20 NOTE — MR AVS SNAPSHOT
[address] [place] 
[phone] Patient: Domenico Robb MRN: [id number]:[id number] Visit Information Date & Time Provider Department Dept. Phone Encounter #  
 8/20/2018 11:45 AM Buzz Calvert, [address] [phone] [id number] Your Appointments 10/1/2018  9:15 AM  
ROUTINE CARE with Buzz Calvert, [address] [facility]) Appt Note: 6 mon f/u  
 [address] [place] [address] [facility] [address]  
  
   
 [address]  [address] [place]  
  
    
 1/29/2019  9:15 AM  
ESTABLISHED PATIENT with Swetha Gandhi MD  
[facility]) Appt Note: SIX MONTH FOLLOW UP-  [address]  
[phone] [address] Upcoming Health Maintenance Date Due  
 GLAUCOMA SCREENING Q2Y 1/31/2009 Influenza Age 5 to Adult 8/1/2018 MEDICARE YEARLY EXAM 3/30/2019 BREAST CANCER SCRN MAMMOGRAM 12/5/2019 DTaP/Tdap/Td series (2 - Td) 9/29/2026 Allergies as of 8/20/2018  Review Complete On: 8/20/2018 By: Marvin Nuno LPN Severity Noted Reaction Type Reactions Amlodipine Medium 01/25/2016    Other (comments) Itching, Muscle cramps Clonidine  08/10/2015    Swelling Ibuprofen  01/11/2015    Swelling Levaquin [Levofloxacin]  08/10/2015    Unknown (comments) Lisinopril  04/09/2015    Angioedema Other Medication  06/29/2018    Rash, Itching Patient reports she is allergic to the PPD test for TB Pcn [Penicillins]  01/11/2015    Swelling Pravastatin  08/10/2015    Myalgia Sulfa (Sulfonamide Antibiotics)  09/04/2013    Hives Current Immunizations  Reviewed on 9/29/2016 Name Date Influenza High Dose Vaccine PF 11/30/2017, 9/29/2016, 10/15/2015 Not reviewed this visit You Were Diagnosed With   
  
 Codes Comments Herpes zoster without complication    -  Primary ICD-10-CM: B02.9 ICD-9-CM: 209. 9 Vitals BP Pulse Temp Resp Height(growth percentile) Weight(growth percentile) 168/72 (BP 1 Location: Left arm, BP Patient Position: Sitting) 67 97.9 °F (36.6 °C) (Oral) 20 5' 7\" (1.702 m) 189 lb (85.7 kg) SpO2 BMI OB Status Smoking Status 99% 29.6 kg/m2 Menopause Never Smoker BMI and BSA Data Body Mass Index Body Surface Area  
 29.6 kg/m 2 2.01 m 2 Preferred Pharmacy Pharmacy Name Phone Ricardo Hirsch [facility], [address] [facility] [phone] Your Updated Medication List  
  
   
This list is accurate as of 8/20/18 12:28 PM.  Always use your most recent med list.  
  
  
  
  
 acetaminophen 500 mg tablet Commonly known as:  TYLENOL Take 500 mg by mouth every six (6) hours as needed for Pain. aspirin delayed-release 81 mg tablet Take 81 mg by mouth daily. Indications: PT states stopping 7/22-7/26 for Eye Surgery  
  
 beclomethasone 80 mcg/actuation Aero Commonly known as:  QVAR Take 1 Puff by inhalation daily as needed. furosemide 40 mg tablet Commonly known as:  LASIX TAKE 1 TABLET BY MOUTH ONCE EVERY DAY  
  
 hydrALAZINE 50 mg tablet Commonly known as:  APRESOLINE  
TAKE 1 TABLET BY MOUTH THREE TIMES DAILY HYDROcodone-acetaminophen 5-325 mg per tablet Commonly known as:  [place] Take 1 Tab by mouth every four (4) hours as needed for Pain. Max Daily Amount: 6 Tabs. irbesartan 300 mg tablet Commonly known as:  AVAPRO Take 1 Tab by mouth nightly. isosorbide mononitrate ER 60 mg CR tablet Commonly known as:  IMDUR  
TAKE 1 TABLET BY MOUTH EVERY DAY  
  
 methylPREDNISolone 4 mg tablet Commonly known as:  MEDROL (NANCY) Take as directed NIFEdipine ER 30 mg ER tablet Commonly known as:  ADALAT CC  
TAKE 1 TABLET BY MOUTH ONCE DAILY  
  
 oxyCODONE-acetaminophen 5-325 mg per tablet Commonly known as:  PERCOCET Take 1 Tab by mouth every eight (8) hours as needed for Pain. Max Daily Amount: 3 Tabs. potassium chloride SR 10 mEq tablet Commonly known as:  KLOR-CON 10  
TAKE 1 TABLET BY MOUTH TWICE DAILY  
  
 raNITIdine 150 mg tablet Commonly known as:  ZANTAC Take 150 mg by mouth two (2) times daily as needed. rosuvastatin 5 mg tablet Commonly known as:  CRESTOR  
TAKE 1 TABLET BY MOUTH EVERY OTHER DAY Prescriptions Printed Refills  
 oxyCODONE-acetaminophen (PERCOCET) 5-325 mg per tablet 0 Sig: Take 1 Tab by mouth every eight (8) hours as needed for Pain. Max Daily Amount: 3 Tabs. Class: Print Route: Oral  
  
Introducing [facility]! [facility] introduces [employer] patient portal. Now you can access parts of your medical record, email your doctor's office, and request medication refills online. 1. In your internet browser, go to [email or web address] 2. Click on the First Time User? Click Here link in the Sign In box. You will see the New Member Sign Up page. 3. Enter your WomenCentric Access Code exactly as it appears below. You will not need to use this code after youve completed the sign-up process. If you do not sign up before the expiration date, you must request a new code. · [employer] Access Code: [email or web address] Expires: 9/26/2018  9:44 AM 
 
4. Enter the last four digits of your Social Security Number (xxxx) and Date of Birth (mm/dd/yyyy) as indicated and click Submit. You will be taken to the next sign-up page. 5. Create a WomenCentric ID. This will be your WomenCentric login ID and cannot be changed, so think of one that is secure and easy to remember. 6. Create a [email or web address] password. You can change your password at any time. 7. Enter your Password Reset Question and Answer. This can be used at a later time if you forget your password. 8. Enter your e-mail address. You will receive e-mail notification when new information is available in [address] 9. Click Sign Up. You can now view and download portions of your medical record. 10. Click the Download Summary menu link to download a portable copy of your medical information. If you have questions, please visit the Frequently Asked Questions section of the TUBE website. Remember, TUBE is NOT to be used for urgent needs. For medical emergencies, dial 911. Now available from your iPhone and Android! Please provide this summary of care documentation to your next provider. Your primary care clinician is listed as Buzz SCOTT. If you have any questions after today's visit, please call [phone].

## 2018-08-24 ENCOUNTER — TELEPHONE (OUTPATIENT)
Dept: INTERNAL MEDICINE CLINIC | Age: 73
End: 2018-08-24

## 2018-08-24 NOTE — TELEPHONE ENCOUNTER
#[phone] pt wants to know if there is something she can rub on her skin where the shingles break out was? It has healed, but she has a burning sensation below the skin. Please call pt back today as this is really bothering her. Thanks.

## 2018-08-28 DIAGNOSIS — B02.9 HERPES ZOSTER WITHOUT COMPLICATION: ICD-10-CM

## 2018-08-28 RX ORDER — OXYCODONE AND ACETAMINOPHEN 5; 325 MG/1; MG/1
1 TABLET ORAL
Qty: 15 TAB | Refills: 0 | Status: SHIPPED | OUTPATIENT
Start: 2018-08-28 | End: 2018-09-11

## 2018-08-28 NOTE — TELEPHONE ENCOUNTER
Pt requesting refill for \" Oxycodone-Acetaminophen 5-325mg\". [facility] [address] [phone].  Best contact [phone].                  Message copied/pasted from [employer]

## 2018-08-29 ENCOUNTER — TELEPHONE (OUTPATIENT)
Dept: INTERNAL MEDICINE CLINIC | Age: 73
End: 2018-08-29

## 2018-08-29 NOTE — TELEPHONE ENCOUNTER
Spoke with patient after 2 patient identifiers being note and advised that RX is here in office and ready for , and advised per Dr. Ezio Rosenberg to try mag citrate for constipation and call back in morning if that does not work. Patient expressed understanding and has no further questions at this time.

## 2018-08-29 NOTE — TELEPHONE ENCOUNTER
[phone]  Pt was returning a call she states that she still is not feeling well and need to know if it will be best for her to go to the emergency room.

## 2018-09-04 ENCOUNTER — TELEPHONE (OUTPATIENT)
Dept: INTERNAL MEDICINE CLINIC | Age: 73
End: 2018-09-04

## 2018-09-04 NOTE — TELEPHONE ENCOUNTER
#[phone] pt discharged from [facility] [place] on 9-1-18 and needs a 7 day BAN appt.   Please call

## 2018-09-06 ENCOUNTER — TELEPHONE (OUTPATIENT)
Dept: CARDIOLOGY CLINIC | Age: 73
End: 2018-09-06

## 2018-09-06 NOTE — TELEPHONE ENCOUNTER
Patient states she needs a call back in reference to getting a Hospital Follow up. Please call.  Thank you

## 2018-09-06 NOTE — TELEPHONE ENCOUNTER
Need to discuss medications -  Was @  [facility] on 8- due to passing out discharged 9-1-2018-  [facility] stated  it was due to her B/P and she hit her head-  Kidneys were highly inflamed-  Requesting a call back from the nurse-            Thanks-

## 2018-09-06 NOTE — TELEPHONE ENCOUNTER
Spoke with patient after 2 patient identifiers being note and advised of appt on Tuesday, September 11, 2018 11:30 AM, patient accpeted. Patient expressed understanding and has no further questions at this time.

## 2018-09-06 NOTE — TELEPHONE ENCOUNTER
LMVM to call me on home phone.  # [phone] has a VM that has not been set up yet. Will need appt to evaluate meds and symptoms.

## 2018-09-11 ENCOUNTER — OFFICE VISIT (OUTPATIENT)
Dept: INTERNAL MEDICINE CLINIC | Age: 73
End: 2018-09-11

## 2018-09-11 VITALS
HEIGHT: 67 IN | OXYGEN SATURATION: 100 % | WEIGHT: 184 LBS | BODY MASS INDEX: 28.88 KG/M2 | HEART RATE: 78 BPM | DIASTOLIC BLOOD PRESSURE: 90 MMHG | TEMPERATURE: 97.9 F | SYSTOLIC BLOOD PRESSURE: 168 MMHG

## 2018-09-11 DIAGNOSIS — N17.9 AKI (ACUTE KIDNEY INJURY) (HCC): ICD-10-CM

## 2018-09-11 DIAGNOSIS — R55 SYNCOPE, UNSPECIFIED SYNCOPE TYPE: Primary | ICD-10-CM

## 2018-09-11 DIAGNOSIS — I95.1 ORTHOSTATIC HYPOTENSION: ICD-10-CM

## 2018-09-11 DIAGNOSIS — I10 ESSENTIAL HYPERTENSION: ICD-10-CM

## 2018-09-11 DIAGNOSIS — R74.8 ELEVATED ALKALINE PHOSPHATASE LEVEL: ICD-10-CM

## 2018-09-11 PROBLEM — M88.9 PAGET'S BONE DISEASE: Status: ACTIVE | Noted: 2018-09-11

## 2018-09-11 NOTE — TELEPHONE ENCOUNTER
Spoke with patient. Verified patient with two patient identifiers. Advised needs appt to see Dr. Lianne Germain, pt has scheduled the appt. Patient verbalized understanding.

## 2018-09-11 NOTE — PROGRESS NOTES
HISTORY OF PRESENT ILLNESS Nafisa Fierro is a 75 y.o. female. HPI Admitted to [facility] for syncope d/t orthostasis 8/30/18 R/o for CVA Had SERINA after CTA head and neck with creatinine up to 0.8 on 8/30 and 1.65 on 9/1/18 Hx mild CKD-saw Dr Rosalba Tubbs in the past per pt Hx  KALPESH sp stent 2015 Held all medicines excpet aspirin 81 mg every day and nifedipine 30 mg every day 
bp up slightly at home Dx with pagets disease-elevated alk phos -no bone pain Last OV: 
Er f/u from 8-11-18 wit left back and left chest /breast pain with rash Dx zoster-treated with valtrex and medrol and norco 
cxr clear, troponin negative. Pain dorys 5-6/10 now per pt 
  
  
  
  
Last OV 
 f/u htn hld carotid artery stenosis-mild Now on crestor 5mg every other day, tolerating- last month Taking hydralazine only 1 every day 
bp was6/76 today at home per pt 
cologuard was negative Patient Active Problem List  
 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  
 beclomethasone (QVAR) 80 mcg/actuation aero Take 1 Puff by inhalation daily as needed. 1 Inhaler 11  
 acetaminophen (TYLENOL) 500 mg tablet Take 500 mg by mouth every six (6) hours as needed for Pain.  aspirin delayed-release 81 mg tablet Take 81 mg by mouth daily. Indications: PT states stopping 7/22-7/26 for Eye Surgery  oxyCODONE-acetaminophen (PERCOCET) 5-325 mg per tablet Take 1 Tab by mouth every eight (8) hours as needed for Pain. Max Daily Amount: 3 Tabs. 15 Tab 0  
 methylPREDNISolone (MEDROL, NANCY,) 4 mg tablet Take as directed 1 Dose Pack 0  
 HYDROcodone-acetaminophen (NORCO) 5-325 mg per tablet Take 1 Tab by mouth every four (4) hours as needed for Pain. Max Daily Amount: 6 Tabs. 15 Tab 0  
 irbesartan (AVAPRO) 300 mg tablet Take 1 Tab by mouth nightly. 30 Tab 6  potassium chloride SR (KLOR-CON 10) 10 mEq tablet TAKE 1 TABLET BY MOUTH TWICE DAILY 60 Tab 6  
 rosuvastatin (CRESTOR) 5 mg tablet TAKE 1 TABLET BY MOUTH EVERY OTHER DAY 15 Tab 11  
 isosorbide mononitrate ER (IMDUR) 60 mg CR tablet TAKE 1 TABLET BY MOUTH EVERY DAY 30 Tab 6  
 hydrALAZINE (APRESOLINE) 50 mg tablet TAKE 1 TABLET BY MOUTH THREE TIMES DAILY (Patient taking differently: Taking 1 tablet twice daily) 90 Tab 3  furosemide (LASIX) 40 mg tablet TAKE 1 TABLET BY MOUTH ONCE EVERY DAY 30 Tab 6  
 ranitidine (ZANTAC) 150 mg tablet Take 150 mg by mouth two (2) times daily as needed. Allergies Allergen Reactions  Amlodipine Other (comments) Itching, Muscle cramps  Clonidine Swelling  Ibuprofen Swelling  Levaquin [Levofloxacin] Unknown (comments)  Lisinopril Angioedema  Other Medication Rash and Itching Patient reports she is allergic to the PPD test for TB  
 Pcn [Penicillins] Swelling  Pravastatin Myalgia  Sulfa (Sulfonamide Antibiotics) Hives Social History Substance Use Topics  Smoking status: Never Smoker  Smokeless tobacco: Never Used  Alcohol use Yes Comment: Drinks [employer] Lab Results Component Value Date/Time Glucose 112 (H) 08/11/2018 09:21 AM  
Glucose (POC) 96 04/27/2015 09:57 PM  
 Microalb/Creat ratio (ug/mg creat.) 62.5 (H) 04/02/2015 09:06 AM  
LDL, calculated 101 (H) 02/16/2018 09:29 AM  
Creatinine 1.28 (H) 08/11/2018 09:21 AM  
  
Lab Results Component Value Date/Time Cholesterol, total 185 02/16/2018 09:29 AM  
HDL Cholesterol 68 02/16/2018 09:29 AM  
LDL, calculated 101 (H) 02/16/2018 09:29 AM  
Triglyceride 79 02/16/2018 09:29 AM  
 
Lab Results Component Value Date/Time GFR est non-AA 41 (L) 08/11/2018 09:21 AM  
GFR est AA 49 (L) 08/11/2018 09:21 AM  
Creatinine 1.28 (H) 08/11/2018 09:21 AM  
BUN 22 (H) 08/11/2018 09:21 AM  
Sodium 133 (L) 08/11/2018 09:21 AM  
Potassium 4.6 08/11/2018 09:21 AM  
Chloride 101 08/11/2018 09:21 AM  
CO2 22 08/11/2018 09:21 AM  
Magnesium 1.7 02/07/2010 03:30 AM  
  
 
ROS Physical Exam  
Constitutional: She appears well-developed and well-nourished. Appears stated age Cardiovascular: Normal rate, regular rhythm and normal heart sounds. Exam reveals no gallop and no friction rub. No murmur heard. Pulmonary/Chest: Effort normal and breath sounds normal. No respiratory distress. She has no wheezes. She has no rales. She exhibits no tenderness. Abdominal: Soft. Bowel sounds are normal. She exhibits no distension and no mass. There is no tenderness. There is no rebound and no guarding. Musculoskeletal: She exhibits no edema. Neurological: She is alert. Psychiatric: She has a normal mood and affect. Nursing note and vitals reviewed. ASSESSMENT and PLAN Diagnoses and all orders for this visit: 1. Syncope, unspecified syncope type Due to orthostatis 2. Orthostatic hypotension Hold lasix, kcl, avapro and imdur 3. SERINA (acute kidney injury) ([facility] [address] -     METABOLIC PANEL, BASIC SERINA on CKD 3 Hold avapro ? Contract nephropathy May need f/u Dr Willian Cho 4. Essential hypertension -     METABOLIC PANEL, BASIC 
-     [address] Restart hydralazine 50 mg tid Continue nifedipine 5. Elevated alkaline phosphatase level -     ALKALINE PHOSPHATASE, BONE No symtpoms and not a candidate for bisphosphonate with CKD 6. HLD Restart crestor 5 mg qd Follow-up Disposition: 
Return in about 3 months (around 12/11/2018) for htn hld.

## 2018-09-11 NOTE — MR AVS SNAPSHOT
[address] [place] [facility] 
[phone] Patient: Jevon Fernandez MRN: [id number]:[id number]/[id number] Visit Information Date & Time Provider Department Dept. Phone Encounter #  
 9/11/2018 11:30 AM Jillian Bruno, [address] [phone] [id number] Follow-up Instructions Return in about 3 months (around 12/11/2018) for htn hld. Routing History Your Appointments 10/1/2018  9:15 AM  
ROUTINE CARE with Jillian Bruno, [address] [facility]) Appt Note: 6 mon f/u  
 [facility] [address] [facility] [address]  
  
   
 [facility] [address] [facility]  [address] [facility]  
  
    
 1/29/2019  9:15 AM  
ESTABLISHED PATIENT with Santosh Mckeon MD  
[facility]) Appt Note: SIX MONTH FOLLOW UP-  [address] [place] [address]  
[phone] [address] Upcoming Health Maintenance Date Due  
 GLAUCOMA SCREENING Q2Y 1/31/2009 Influenza Age 5 to Adult 8/1/2018 MEDICARE YEARLY EXAM 3/30/2019 BREAST CANCER SCRN MAMMOGRAM 12/5/2019 DTaP/Tdap/Td series (2 - Td) 9/29/2026 Allergies as of 9/11/2018  Review Complete On: 9/11/2018 By: Tessa Jasmine LPN Severity Noted Reaction Type Reactions Amlodipine Medium 01/25/2016    Other (comments) Itching, Muscle cramps Clonidine  08/10/2015    Swelling Ibuprofen  01/11/2015    Swelling Levaquin [Levofloxacin]  08/10/2015    Unknown (comments) Lisinopril  04/09/2015    Angioedema Other Medication  06/29/2018    Rash, Itching Patient reports she is allergic to the PPD test for TB Pcn [Penicillins]  01/11/2015    Swelling Pravastatin  08/10/2015    Myalgia Sulfa (Sulfonamide Antibiotics)  09/04/2013    Hives Current Immunizations  Reviewed on 9/29/2016 Name Date Influenza High Dose Vaccine PF 11/30/2017, 9/29/2016, 10/15/2015 Not reviewed this visit You Were Diagnosed With   
  
 Codes Comments Syncope, unspecified syncope type    -  Primary ICD-10-CM: R55 
ICD-9-CM: 780.2 Orthostatic hypotension     ICD-10-CM: I95.1 ICD-9-CM: 458.0 SERINA (acute kidney injury) ([facility] [address]     ICD-10-CM: N17.9 ICD-9-CM: 584.9 Essential hypertension     ICD-10-CM: I10 
ICD-9-CM: 401.9 Elevated alkaline phosphatase level     ICD-10-CM: R74.8 ICD-9-CM: 790.5 Vitals BP Pulse Temp Height(growth percentile) Weight(growth percentile) SpO2  
 168/90 (BP 1 Location: Left arm, BP Patient Position: Sitting) 78 97.9 °F (36.6 °C) (Oral) 5' 7\" (1.702 m) 184 lb (83.5 kg) 100% BMI OB Status Smoking Status 28.82 kg/m2 Menopause Never Smoker BMI and BSA Data Body Mass Index Body Surface Area  
 28.82 kg/m 2 1.99 m 2 Preferred Pharmacy Pharmacy Name Phone Ricardo Hirsch [facility] [address] [facility] [phone] Your Updated Medication List  
  
   
This list is accurate as of 9/11/18 12:24 PM.  Always use your most recent med list.  
  
  
  
  
 acetaminophen 500 mg tablet Commonly known as:  TYLENOL Take 500 mg by mouth every six (6) hours as needed for Pain. aspirin delayed-release 81 mg tablet Take 81 mg by mouth daily. Indications: PT states stopping 7/22-7/26 for Eye Surgery  
  
 beclomethasone 80 mcg/actuation Aero Commonly known as:  QVAR Take 1 Puff by inhalation daily as needed. furosemide 40 mg tablet Commonly known as:  LASIX TAKE 1 TABLET BY MOUTH ONCE EVERY DAY  
  
 hydrALAZINE 50 mg tablet Commonly known as:  APRESOLINE  
TAKE 1 TABLET BY MOUTH THREE TIMES DAILY HYDROcodone-acetaminophen 5-325 mg per tablet Commonly known as:  Rolinda Doles Take 1 Tab by mouth every four (4) hours as needed for Pain. Max Daily Amount: 6 Tabs. irbesartan 300 mg tablet Commonly known as:  AVAPRO Take 1 Tab by mouth nightly. isosorbide mononitrate ER 60 mg CR tablet Commonly known as:  IMDUR  
TAKE 1 TABLET BY MOUTH EVERY DAY  
  
 methylPREDNISolone 4 mg tablet Commonly known as:  MEDROL (NANCY) Take as directed NIFEdipine ER 30 mg ER tablet Commonly known as:  ADALAT CC  
TAKE 1 TABLET BY MOUTH ONCE DAILY  
  
 oxyCODONE-acetaminophen 5-325 mg per tablet Commonly known as:  PERCOCET Take 1 Tab by mouth every eight (8) hours as needed for Pain. Max Daily Amount: 3 Tabs. potassium chloride SR 10 mEq tablet Commonly known as:  KLOR-CON 10  
TAKE 1 TABLET BY MOUTH TWICE DAILY  
  
 raNITIdine 150 mg tablet Commonly known as:  ZANTAC Take 150 mg by mouth two (2) times daily as needed. rosuvastatin 5 mg tablet Commonly known as:  CRESTOR  
TAKE 1 TABLET BY MOUTH EVERY OTHER DAY We Performed the Following ALKALINE PHOSPHATASE, BONE [82400 CPT(R)] METABOLIC PANEL, BASIC [36930 CPT(R)] REFERRAL TO CARDIOLOGY [QCJ75 Custom] Follow-up Instructions Return in about 3 months (around 12/11/2018) for htn hld. Referral Information Referral ID Referred By Referred To  
  
 [id number] TYLER [address] [facility] [address] [place] MD   
   [address] [place] [address] Phone: [phone] Fax: [phone] Visits Status Start Date End Date 1 New Request 9/11/18 9/11/19 If your referral has a status of pending review or denied, additional information will be sent to support the outcome of this decision. Introducing [facility]!    
 [facility] introduces [employer] patient portal. Now you can access parts of your medical record, email your doctor's office, and request medication refills online. 1. In your internet browser, go to [email or web address] 2. Click on the First Time User? Click Here link in the Sign In box. You will see the New Member Sign Up page. 3. Enter your The[employer] Access Code exactly as it appears below. You will not need to use this code after youve completed the sign-up process. If you do not sign up before the expiration date, you must request a new code. · [employer] Access Code: [email or web address] Expires: 9/26/2018  9:44 AM 
 
4. Enter the last four digits of your Social Security Number (xxxx) and Date of Birth (mm/dd/yyyy) as indicated and click Submit. You will be taken to the next sign-up page. 5. Create a TheraCoat ID. This will be your The[employer] login ID and cannot be changed, so think of one that is secure and easy to remember. 6. Create a The[employer] password. You can change your password at any time. 7. Enter your Password Reset Question and Answer. This can be used at a later time if you forget your password. 8. Enter your e-mail address. You will receive e-mail notification when new information is available in [address] 9. Click Sign Up. You can now view and download portions of your medical record. 10. Click the Download Summary menu link to download a portable copy of your medical information. If you have questions, please visit the Frequently Asked Questions section of the TheraCoat website. Remember, The[employer]Coat is NOT to be used for urgent needs. For medical emergencies, dial 911. Now available from your iPhone and Android! Please provide this summary of care documentation to your next provider. Your primary care clinician is listed as Siddhartha SCOTT. If you have any questions after today's visit, please call [phone].

## 2018-09-11 NOTE — PROGRESS NOTES
Chief Complaint Patient presents with  
[facility] Follow Up  
  dehydration, Hypotension, syncope and kidneys  Follow-up  
  shingles left side of ribs and back area  Medication Evaluation  
  stop alot of her medications

## 2018-09-17 ENCOUNTER — TELEPHONE (OUTPATIENT)
Dept: INTERNAL MEDICINE CLINIC | Age: 73
End: 2018-09-17

## 2018-09-17 NOTE — TELEPHONE ENCOUNTER
Spoke with patient after 2 patient identifiers being note and advised that labs could be drawn here in the office at her leisure. Patient expressed understanding and has no further questions at this time.

## 2018-09-17 NOTE — TELEPHONE ENCOUNTER
#[phone] and/or #[phone]  Pt needs to know when her appt is with Dr. Antonella Sevilla and when is she to get labs? She is confused by the paperwork.

## 2018-09-19 ENCOUNTER — APPOINTMENT (OUTPATIENT)
Dept: INTERNAL MEDICINE CLINIC | Age: 73
End: 2018-09-19

## 2018-09-19 ENCOUNTER — HOSPITAL ENCOUNTER (OUTPATIENT)
Dept: LAB | Age: 73
Discharge: HOME | End: 2018-09-19
Payer: MEDICARE

## 2018-09-19 PROCEDURE — 80048 BASIC METABOLIC PNL TOTAL CA: CPT

## 2018-09-19 PROCEDURE — 84080 ASSAY ALKALINE PHOSPHATASES: CPT

## 2018-09-19 PROCEDURE — 36415 COLL VENOUS BLD VENIPUNCTURE: CPT

## 2018-09-20 LAB
ALP BONE SERPL-MCNC: 82.9 UG/L
BUN SERPL-MCNC: 11 MG/DL (ref 8–27)
BUN/CREAT SERPL: 12 (ref 12–28)
CALCIUM SERPL-MCNC: 9.9 MG/DL (ref 8.7–10.3)
CHLORIDE SERPL-SCNC: 103 MMOL/L (ref 96–106)
CO2 SERPL-SCNC: 20 MMOL/L (ref 20–29)
CREAT SERPL-MCNC: 0.94 MG/DL (ref 0.57–1)
GLUCOSE SERPL-MCNC: 95 MG/DL (ref 65–99)
POTASSIUM SERPL-SCNC: 4.3 MMOL/L (ref 3.5–5.2)
SODIUM SERPL-SCNC: 143 MMOL/L (ref 134–144)

## 2018-09-21 NOTE — PROGRESS NOTES
Patient has been notified of recent labs and advise to follow  instructions. Patient understood and agreed.

## 2018-09-21 NOTE — PROGRESS NOTES
Tell pt her repeat kidney function labs are wnlo--stay off avapro for now.  Continue the hydralazine, nifedine, aspirin and crestor

## 2018-09-28 ENCOUNTER — OFFICE VISIT (OUTPATIENT)
Dept: CARDIOLOGY CLINIC | Age: 73
End: 2018-09-28

## 2018-09-28 VITALS
HEIGHT: 67 IN | SYSTOLIC BLOOD PRESSURE: 142 MMHG | HEART RATE: 74 BPM | DIASTOLIC BLOOD PRESSURE: 72 MMHG | BODY MASS INDEX: 28.66 KG/M2 | OXYGEN SATURATION: 97 % | WEIGHT: 182.6 LBS | RESPIRATION RATE: 16 BRPM

## 2018-09-28 DIAGNOSIS — R60.0 EDEMA OF BOTH LEGS: ICD-10-CM

## 2018-09-28 DIAGNOSIS — I95.1 SYNCOPE DUE TO ORTHOSTATIC HYPOTENSION: Primary | ICD-10-CM

## 2018-09-28 DIAGNOSIS — E78.00 HIGH CHOLESTEROL: ICD-10-CM

## 2018-09-28 DIAGNOSIS — I70.1 BILATERAL RENAL ARTERY STENOSIS (HCC): ICD-10-CM

## 2018-09-28 DIAGNOSIS — R06.83 SNORES: ICD-10-CM

## 2018-09-28 DIAGNOSIS — M88.9 PAGET'S BONE DISEASE: ICD-10-CM

## 2018-09-28 DIAGNOSIS — I11.9 HYPERTENSIVE LEFT VENTRICULAR HYPERTROPHY, WITHOUT HEART FAILURE: ICD-10-CM

## 2018-09-28 DIAGNOSIS — I10 ESSENTIAL HYPERTENSION, MALIGNANT: ICD-10-CM

## 2018-09-28 NOTE — PROGRESS NOTES
[facility]   [address] [facility], [facility] [address] [facility] [address]                                          NEW PATIENT HPI/FOLLOW-UP      NAME:  Stephanie Faye   :   1945   MRN:   [id number]   PCP:  Babak Dacosta MD           Subjective: The patient is a 75y.o. year old female  who returns for a routine follow-up after recent hospitalization [facility] for syncope due to orthostatic hypotension due to combination antihypertensive meds(including Lasix) and dehydration(acute kidney injury) due to poor intake associated with progressive constipation so severe patient states that she took laxatives and attempted to self-disimpact with partial relief. Also states that she went to ED [id number] [facility] for relief. Was discharged from [facility] on readjusted lower dose antihypertensive meds. Denies change in exercise tolerance, chest pain, edema, medication intolerance, palpitations, shortness of breath, PND/orthopnea wheezing, sputum, syncope, dizziness or light headedness. Doing satisfactorily. Review of Systems  General: Pt denies excessive weight gain or loss. Pt is able to conduct ADL's. Respiratory: Denies shortness of breath, EGAN, wheezing or stridor.   Cardiovascular: Denies precordial pain, palpitations, edema or PND  Gastrointestinal: Denies poor appetite, indigestion, abdominal pain or blood in stool  Peripheral vascular: Denies claudication, leg cramps  Neuropsychiatric: Denies paresthesias,tingling,numbness,anxiety,depression,fatigue  Musculoskeletal: Denies pain,tenderness, soreness,swelling      Past Medical History:   Diagnosis Date    Arthritis     hands    Asthma     Essential hypertension     GERD (gastroesophageal reflux disease)     High cholesterol     HTN (hypertension)     Ill-defined condition     hyperlipidemia    Left ventricular hypertrophy due to hypertensive disease     Renal artery stenosis ([facility] [address]     Shingles 2018     Patient Active Problem List    Diagnosis Date Noted    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    Carotid bruit present 12/05/2017    Bilateral carotid artery stenosis 12/05/2017    History of tubal ligation 09/14/2015    Bilateral renal artery stenosis (HCC)--s/p PTA/stenting 7/10/15 [facility] 08/09/2015    Stress at home 06/13/2015    Essential hypertension, malignant 04/26/2015     Class: Present on Admission    H/O gastroesophageal reflux (GERD) 03/12/2015    Obesity 09/04/2013    Edema of both legs--chronic venous insufficiency,amlodipine 09/04/2013    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      Allergies   Allergen Reactions    Amlodipine Other (comments)     Itching,  Muscle cramps    Clonidine Swelling    Ibuprofen Swelling    Levaquin [Levofloxacin] Unknown (comments)    Lisinopril Angioedema    Other Medication Rash and Itching     Patient reports she is allergic to the PPD test for TB    Pcn [Penicillins] Swelling    Pravastatin Myalgia    Sulfa (Sulfonamide Antibiotics) Hives      Family History   Problem Relation Age of Onset    Cancer Mother     Heart Disease Mother     Heart Disease Father     Hypertension Father     Lung Disease Father     Hypertension Brother     Diabetes Brother     Heart Disease Brother     Kidney Disease Brother     Hypertension Brother       Social History     Social History    Marital status:      Spouse name: N/A    Number of children: N/A    Years of education: N/A     Occupational History  Not on file. Social History Main Topics    Smoking status: Never Smoker    Smokeless tobacco: Never Used    Alcohol use Yes      Comment: Drinks Lite Beer    Drug use: Yes     Special: Prescription, OTC, Opiates      Comment: perocet    Sexual activity: Not Currently     Partners: Male     Other Topics Concern    Not on file     Social History Narrative      Current Outpatient Prescriptions   Medication Sig    NIFEdipine ER (ADALAT CC) 30 mg ER tablet TAKE 1 TABLET BY MOUTH ONCE DAILY    rosuvastatin (CRESTOR) 5 mg tablet TAKE 1 TABLET BY MOUTH EVERY OTHER DAY    hydrALAZINE (APRESOLINE) 50 mg tablet TAKE 1 TABLET BY MOUTH THREE TIMES DAILY (Patient taking differently: Taking 1 tablet twice daily)    beclomethasone (QVAR) 80 mcg/actuation aero Take 1 Puff by inhalation daily as needed.  acetaminophen (TYLENOL) 500 mg tablet Take 500 mg by mouth every six (6) hours as needed for Pain.  aspirin delayed-release 81 mg tablet Take 81 mg by mouth daily. Indications: PT states stopping 7/22-7/26 for Eye Surgery    irbesartan (AVAPRO) 300 mg tablet Take 1 Tab by mouth nightly.  potassium chloride SR (KLOR-CON 10) 10 mEq tablet TAKE 1 TABLET BY MOUTH TWICE DAILY    isosorbide mononitrate ER (IMDUR) 60 mg CR tablet TAKE 1 TABLET BY MOUTH EVERY DAY    furosemide (LASIX) 40 mg tablet TAKE 1 TABLET BY MOUTH ONCE EVERY DAY    ranitidine (ZANTAC) 150 mg tablet Take 150 mg by mouth two (2) times daily as needed. No current facility-administered medications for this visit. I have reviewed the nurses notes, vitals, problem list, allergy list, medical history, family medical, social history and medications. Objective:     Physical Exam:     Vitals:    09/28/18 1258 09/28/18 1318 09/28/18 1319   BP: 152/82 150/80 142/72   Pulse: 74     Resp: 16     SpO2: 97%     Weight: 182 lb 9.6 oz (82.8 kg)     Height: 5' 7\" (1.702 m)      Body mass index is 28.6 kg/(m^2).     General: Well developed, in no acute distress. HEENT: No carotid bruits, no JVD, trach is midline. Heart:  Normal S1/S2 negative S3 or S4. Regular, no murmur, gallop or rub.   Respiratory: Clear bilaterally, no wheezing or rales  Abdomen:   Soft, non-tender, bowel sounds are active.   Extremities:  No edema, normal cap refill, no cyanosis. Neuro: A&Ox3, speech clear, gait stable. Skin: Skin color is normal. No rashes or lesions. No diaphoresis.   Vascular: 2+ pulses symmetric in all extremities        Data Review:       Cardiographics:    EKG: NSR,1st degree AV HB,LAE,minor non-specific ST-T wave changes    Cardiology Labs:    Results for orders placed or performed during the hospital encounter of 08/11/18   EKG, 12 LEAD, INITIAL   Result Value Ref Range    Ventricular Rate 72 BPM    Atrial Rate 72 BPM    P-R Interval 188 ms    QRS Duration 72 ms    Q-T Interval 390 ms    QTC Calculation (Bezet) 427 ms    Calculated P Axis 55 degrees    Calculated R Axis 11 degrees    Calculated T Axis 39 degrees    Diagnosis       Normal sinus rhythm  Nonspecific T wave abnormality  When compared with ECG of 25-APR-2015 22:53,  No significant change  Confirmed by Aiden Ivan ([id number]) on 8/12/2018 11:31:12 AM         Lab Results   Component Value Date/Time    Cholesterol, total 185 02/16/2018 09:29 AM    HDL Cholesterol 68 02/16/2018 09:29 AM    LDL, calculated 101 (H) 02/16/2018 09:29 AM    Triglyceride 79 02/16/2018 09:29 AM       Lab Results   Component Value Date/Time    Sodium 143 09/19/2018 11:10 AM    Potassium 4.3 09/19/2018 11:10 AM    Chloride 103 09/19/2018 11:10 AM    CO2 20 09/19/2018 11:10 AM    Anion gap 10 08/11/2018 09:21 AM    Glucose 95 09/19/2018 11:10 AM    BUN 11 09/19/2018 11:10 AM    Creatinine 0.94 09/19/2018 11:10 AM    BUN/Creatinine ratio 12 09/19/2018 11:10 AM    GFR est AA 69 09/19/2018 11:10 AM    GFR est non-AA 60 09/19/2018 11:10 AM    Calcium 9.9 09/19/2018 11:10 AM    Bilirubin, total 0.7 08/11/2018 09:21 AM AST (SGOT) 24 08/11/2018 09:21 AM    Alk. phosphatase 312 (H) 08/11/2018 09:21 AM    Protein, total 7.5 08/11/2018 09:21 AM    Albumin 3.8 08/11/2018 09:21 AM    Globulin 3.7 08/11/2018 09:21 AM    A-G Ratio 1.0 (L) 08/11/2018 09:21 AM    ALT (SGPT) 18 08/11/2018 09:21 AM          Assessment:       ICD-10-CM ICD-9-CM    1. Syncope due to orthostatic hypotension I95.1 458.0 AMB POC EKG ROUTINE W/ 12 LEADS, INTER & REP   2. Essential hypertension, malignant I10 401.0    3. Bilateral renal artery stenosis (HCC)--s/p PTA/stenting 7/10/15 [facility] I70.1 440.1    4. Hypertensive left ventricular hypertrophy, without heart failure I11.9 402.90    5. Paget's bone disease M88.9 731.0    6. Edema of both legs--chronic venous insufficiency,amlodipine R60.0 782.3    7. 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 High cholesterol E78.00 272.0          Discussion: Patient presents at this time stable from a cardiac perspective. BP high normal. At this time continue same. Advised to call in 1-2 weeks and adjust meds as needed. Likely will need higher dose of Nifedipine. Syncope etiology multifactorial and not intrinsically cardiac--arrhythmic nor conduction driven. Advised to stay hydrated and avoid constipation and constipation prone pain meds. Pleased with present status. Plan: 1. Continue same meds. Lipid profile and labs followed by PCP--near goal--does not tolerate high dose statins. 2.Encouraged to exercise to tolerance, lose weight and follow low fat, low cholesterol, low sodium predominantly Plant-based (consider Mediterranean) diet. Call with questions or concerns. Will follow up any test results by phone and/or f/u here in office if needed. Suzan Solorzano 3.Follow up: 6 months or sooner if needed. I have discussed the diagnosis with the patient and the intended plan as seen in the above orders. The patient has received an after-visit summary and questions were answered concerning future plans.   I have discussed any concerning medication side effects and warnings with the patient as well.     Brittni Nunez MD  9/28/2018

## 2018-09-28 NOTE — PROGRESS NOTES
Chief Complaint   Patient presents with    Dizziness     8/11/2018 seen in ED due to chest pain/burning dx with shingles since that time passed out 8/30/2018 (seen at [facility])     1. Have you been to the ER, urgent care clinic since your last visit? Hospitalized since your last visit? Yes When: [facility]  ED  8/30/2018    2. Have you seen or consulted any other health care providers outside of the [address] [place] [address] since your last visit? Include any pap smears or colon screening.  Yes Dr Lani Briones 1 week ago

## 2018-09-28 NOTE — MR AVS SNAPSHOT
[address] [facility] [place] 
 
 
 [address] [place] [address] 
[phone] Patient: Tasha Carrillo MRN: [id number] SPJ:[birth year] Visit Information Date & Time Provider Department Dept. Phone Encounter #  
 9/28/2018  1:00 PM MD Marita Le[facility] [place] [address] [id number] Your Appointments 12/11/2018 11:30 AM  
ROUTINE CARE with Mushtaq Barton, [address] [facility]) Appt Note: 6 mon f/u for htn; 3 mon f/u  
 [address] [place] [address] [facility] [id number]  
  
   
 [address] [facility]  [address]  
  
    
 1/29/2019  9:15 AM  
ESTABLISHED PATIENT with MD Marita León [facility]) Appt Note: SIX MONTH FOLLOW UP-  [facility]  
 [address] [place] [address]  
[phone] [address]  
  
    
 3/27/2019 10:00 AM  
ESTABLISHED PATIENT with MD Marita León [facility]) Appt Note: [facility] MON[facility]-  [facility]  
 [address] [place] [address]  
[phone] Upcoming Health Maintenance Date Due Shingrix Vaccine Age 50> (1 of 2) 1/31/1994 GLAUCOMA SCREENING Q2Y 1/31/2009 Influenza Age 5 to Adult 3/31/2019* MEDICARE YEARLY EXAM 3/30/2019 BREAST CANCER SCRN MAMMOGRAM 12/5/2019 DTaP/Tdap/Td series (2 - Td) 9/29/2026 *Topic was postponed. The date shown is not the original due date. Allergies as of 9/28/2018  Review Complete On: 9/28/2018 By: Kilo Jones LPN Severity Noted Reaction Type Reactions Amlodipine Medium 01/25/2016    Other (comments) Itching, Muscle cramps Clonidine  08/10/2015    Swelling Ibuprofen  01/11/2015    Swelling Levaquin [Levofloxacin]  08/10/2015    Unknown (comments) Lisinopril  04/09/2015    Angioedema Other Medication  06/29/2018    Rash, Itching Patient reports she is allergic to the PPD test for TB Pcn [Penicillins]  01/11/2015    Swelling Pravastatin  08/10/2015    Myalgia Sulfa (Sulfonamide Antibiotics)  09/04/2013    Hives Current Immunizations  Reviewed on 9/29/2016 Name Date Influenza High Dose Vaccine PF 11/30/2017, 9/29/2016, 10/15/2015 Not reviewed this visit You Were Diagnosed With   
  
 Codes Comments Essential hypertension, malignant    -  Primary ICD-10-CM: I10 
ICD-9-CM: 401.0 Dizziness     ICD-10-CM: O79 ICD-9-CM: 780.4 Bilateral renal artery stenosis (HCC)     ICD-10-CM: I70.1 ICD-9-CM: 440.1 Hypertensive left ventricular hypertrophy, without heart failure     ICD-10-CM: I11.9 ICD-9-CM: 402.90 Paget's bone disease     ICD-10-CM: M88.9 ICD-9-CM: 731.0 Edema of both legs     ICD-10-CM: R60.0 ICD-9-CM: 782.3 Snores     ICD-10-CM: R06.83 
ICD-9-CM: 786.09 Vitals BP Pulse Resp Height(growth percentile) Weight(growth percentile) SpO2  
 142/72 (BP 1 Location: Left arm, BP Patient Position: Standing) 74 16 5' 7\" (1.702 m) 182 lb 9.6 oz (82.8 kg) 97% BMI OB Status Smoking Status 28.6 kg/m2 Menopause Never Smoker Vitals History BMI and BSA Data Body Mass Index Body Surface Area  
 28.6 kg/m 2 1.98 m 2 Preferred Pharmacy Pharm[facility] [place] [facility] [address] [facility] [facility] [phone] Your Updated Medication List  
  
   
This list is accurate as of 9/28/18  1:57 PM.  Always use your most recent med list.  
  
  
  
  
 acetaminophen 500 mg tablet Commonly known as:  TYLENOL Take 500 mg by mouth every six (6) hours as needed for Pain. aspirin delayed-release 81 mg tablet Take 81 mg by mouth daily. Indications: PT states stopping 7/22-7/26 for Eye Surgery  
  
 beclomethasone 80 mcg/actuation Aero Commonly known as:  QVAR Take 1 Puff by inhalation daily as needed. furosemide 40 mg tablet Commonly known as:  LASIX TAKE 1 TABLET BY MOUTH ONCE EVERY DAY  
  
 hydrALAZINE 50 mg tablet Commonly known as:  APRESOLINE  
TAKE 1 TABLET BY MOUTH THREE TIMES DAILY  
  
 irbesartan 300 mg tablet Commonly known as:  AVAPRO Take 1 Tab by mouth nightly. isosorbide mononitrate ER 60 mg CR tablet Commonly known as:  IMDUR  
TAKE 1 TABLET BY MOUTH EVERY DAY  
  
 NIFEdipine ER 30 mg ER tablet Commonly known as:  ADALAT CC  
TAKE 1 TABLET BY MOUTH ONCE DAILY potassium chloride SR 10 mEq tablet Commonly known as:  KLOR-CON 10  
TAKE 1 TABLET BY MOUTH TWICE DAILY  
  
 raNITIdine 150 mg tablet Commonly known as:  ZANTAC Take 150 mg by mouth two (2) times daily as needed. rosuvastatin 5 mg tablet Commonly known as:  CRESTOR  
TAKE 1 TABLET BY MOUTH EVERY OTHER DAY We Performed the Following AMB POC EKG ROUTINE W/ 12 LEADS, INTER & REP [08081 CPT(R)] Introducing [facility]! Ellis Bal introduces [employer] patient portal. Now you can access parts of your medical record, email your doctor's office, and request medication refills online. 1. In your internet browser, go to [email or web address] 2. Click on the First Time User? Click Here link in the Sign In box. You will see the New Member Sign Up page. 3. Enter your [employer] Access Code exactly as it appears below. You will not need to use this code after youve completed the sign-up process. If you do not sign up before the expiration date, you must request a new code. · Social[employer] Access Code: [email or web address] Expires: 12/27/2018  1:57 PM 
 
4.  Enter the last four digits of your Social Security Number (xxxx) and Date of Birth (mm/dd/yyyy) as indicated and click Submit. You will be taken to the next sign-up page. 5. Create a [employer] ID. This will be your [employer] login ID and cannot be changed, so think of one that is secure and easy to remember. 6. Create a [employer] password. You can change your password at any time. 7. Enter your Password Reset Question and Answer. This can be used at a later time if you forget your password. 8. Enter your e-mail address. You will receive e-mail notification when new information is available in [address] 9. Click Sign Up. You can now view and download portions of your medical record. 10. Click the Download Summary menu link to download a portable copy of your medical information. If you have questions, please visit the Frequently Asked Questions section of the [employer] website. Remember, [employer] is NOT to be used for urgent needs. For medical emergencies, dial 911. Now available from your iPhone and Android! Please provide this summary of care documentation to your next provider. Your primary care clinician is listed as Kevin SCOTT. If you have any questions after today's visit, please call [phone].

## 2018-09-30 PROBLEM — R55 SYNCOPE AND COLLAPSE: Status: ACTIVE | Noted: 2018-09-30

## 2018-09-30 PROBLEM — I95.1 SYNCOPE DUE TO ORTHOSTATIC HYPOTENSION: Status: ACTIVE | Noted: 2018-09-30

## 2018-10-22 RX ORDER — GABAPENTIN 100 MG/1
100 CAPSULE ORAL 3 TIMES DAILY
Qty: 90 CAP | Refills: 5 | Status: SHIPPED | OUTPATIENT
Start: 2018-10-22 | End: 2018-12-11

## 2018-10-22 NOTE — PROGRESS NOTES
Pt called c/o mod-severe burning pain and chest from shingles 2 month ago. I escribed neurontin 100mg tid.  Pt will call or return if not improving pain

## 2018-10-23 ENCOUNTER — TELEPHONE (OUTPATIENT)
Dept: CARDIOLOGY CLINIC | Age: 73
End: 2018-10-23

## 2018-10-26 NOTE — TELEPHONE ENCOUNTER
I called and spoke with the patient. 2 person ID done. I informed her that Dr. Jose Alonso said, Rachel Salgado! \" related to the Gabapentin. She informed me that she had started taking the medication after she called and spoke with the pharmacist. She said, \"The pain is much better. \" She thanked me for the call.

## 2018-12-06 ENCOUNTER — HOSPITAL ENCOUNTER (OUTPATIENT)
Dept: MAMMOGRAPHY | Age: 73
Discharge: HOME | End: 2018-12-06
Attending: INTERNAL MEDICINE
Payer: MEDICARE

## 2018-12-06 DIAGNOSIS — Z12.31 VISIT FOR SCREENING MAMMOGRAM: ICD-10-CM

## 2018-12-06 PROCEDURE — 77063 BREAST TOMOSYNTHESIS BI: CPT

## 2018-12-11 ENCOUNTER — OFFICE VISIT (OUTPATIENT)
Dept: INTERNAL MEDICINE CLINIC | Age: 73
End: 2018-12-11

## 2018-12-11 VITALS
HEIGHT: 67 IN | BODY MASS INDEX: 28.88 KG/M2 | OXYGEN SATURATION: 99 % | DIASTOLIC BLOOD PRESSURE: 75 MMHG | SYSTOLIC BLOOD PRESSURE: 168 MMHG | WEIGHT: 184 LBS | HEART RATE: 73 BPM | TEMPERATURE: 98 F

## 2018-12-11 DIAGNOSIS — R06.83 SNORES: ICD-10-CM

## 2018-12-11 DIAGNOSIS — Z87.19 H/O GASTROESOPHAGEAL REFLUX (GERD): ICD-10-CM

## 2018-12-11 DIAGNOSIS — I70.1 BILATERAL RENAL ARTERY STENOSIS (HCC): ICD-10-CM

## 2018-12-11 DIAGNOSIS — R60.0 EDEMA OF BOTH LEGS: ICD-10-CM

## 2018-12-11 DIAGNOSIS — B02.29 POST HERPETIC NEURALGIA: Primary | ICD-10-CM

## 2018-12-11 DIAGNOSIS — I10 ESSENTIAL HYPERTENSION, MALIGNANT: ICD-10-CM

## 2018-12-11 DIAGNOSIS — Z23 ENCOUNTER FOR IMMUNIZATION: ICD-10-CM

## 2018-12-11 RX ORDER — TRAMADOL HYDROCHLORIDE 50 MG/1
50 TABLET ORAL
Qty: 30 TAB | Refills: 0 | Status: SHIPPED | OUTPATIENT
Start: 2018-12-11 | End: 2019-10-08

## 2018-12-11 RX ORDER — NIFEDIPINE 30 MG/1
60 TABLET, FILM COATED, EXTENDED RELEASE ORAL DAILY
Qty: 180 TAB | Refills: 3 | Status: SHIPPED | OUTPATIENT
Start: 2018-12-11 | End: 2019-08-13 | Stop reason: DRUGHIGH

## 2018-12-11 RX ORDER — GABAPENTIN 300 MG/1
300 CAPSULE ORAL 2 TIMES DAILY
Qty: 180 CAP | Refills: 3 | Status: SHIPPED | OUTPATIENT
Start: 2018-12-11 | End: 2019-10-08

## 2018-12-11 NOTE — PROGRESS NOTES
Chief Complaint   Patient presents with    Hypertension     6 month follow up    Foot Swelling     right ankle and foot

## 2018-12-11 NOTE — PROGRESS NOTES
HISTORY OF PRESENT ILLNESS  Rolando Gonzalez is a 75 y.o. female. HPI   F/u HTN HLD postherpetic neuralgia. kleg edema  Still has left chest and breast pain but the shingles rash has cleared, burning sensation at nigth left breast  Mammogram negative recently  No longer dizzy excpet if gets up too quickly  Slight increase right ankle edema      Last OV  Admitted to [facility] for syncope d/t orthostasis 8/30/18  R/o for CVA  Had SERINA after CTA head and neck with creatinine up to 0.8 on 8/30 and 1.65 on 9/1/18  Hx mild CKD-saw Dr Saira Looney in the past per pt  Hx  KALPESH sp stent 2015  Held all medicines excpet aspirin 81 mg every day and nifedipine 30 mg every day  bp up slightly at home     Dx with pagets disease-elevated alk phos -no bone pain     Last OV:  Er f/u from 8-11-18 wit left back and left chest /breast pain with rash  Dx zoster-treated with valtrex and medrol and norco  cxr clear, troponin negative. Pain dorys 5-6/10 now per pt           Patient Active Problem List    Diagnosis Date Noted    Syncope and collapse 09/30/2018    Syncope due to orthostatic hypotension 09/30/2018    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    Carotid bruit present 12/05/2017    Bilateral carotid artery stenosis 12/05/2017    History of tubal ligation 09/14/2015    [facility]    Stress at home 06/13/2015    Essential hypertension, malignant 04/26/2015     Class: Present on Admission    H/O gastroesophageal reflux (GERD) 03/12/2015    Obesity 09/04/2013    Edema of both legs--chronic venous insufficiency,amlodipine 09/04/2013    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  90 Cap 5    NIFEdipine ER (ADALAT CC) 30 mg ER tablet TAKE 1 TABLET BY MOUTH ONCE DAILY 30 Tab 6    rosuvastatin (CRESTOR) 5 mg tablet TAKE 1 TABLET BY MOUTH EVERY OTHER DAY 15 Tab 11    hydrALAZINE (APRESOLINE) 50 mg tablet TAKE 1 TABLET BY MOUTH THREE TIMES DAILY (Patient taking differently: Taking 1 tablet twice daily) 90 Tab 3    beclomethasone (QVAR) 80 mcg/actuation aero Take 1 Puff by inhalation daily as needed. 1 Inhaler 11    acetaminophen (TYLENOL) 500 mg tablet Take 500 mg by mouth every six (6) hours as needed for Pain.  aspirin delayed-release 81 mg tablet Take 81 mg by mouth daily. Indications: PT states stopping 7/22-7/26 for Eye Surgery      irbesartan (AVAPRO) 300 mg tablet Take 1 Tab by mouth nightly. 30 Tab 6    potassium chloride SR (KLOR-CON 10) 10 mEq tablet TAKE 1 TABLET BY MOUTH TWICE DAILY 60 Tab 6    isosorbide mononitrate ER (IMDUR) 60 mg CR tablet TAKE 1 TABLET BY MOUTH EVERY DAY 30 Tab 6    furosemide (LASIX) 40 mg tablet TAKE 1 TABLET BY MOUTH ONCE EVERY DAY 30 Tab 6    ranitidine (ZANTAC) 150 mg tablet Take 150 mg by mouth two (2) times daily as needed.        Allergies   Allergen Reactions    Amlodipine Other (comments)     Itching,  Muscle cramps    Clonidine Swelling    Ibuprofen Swelling    Levaquin [Levofloxacin] Unknown (comments)    Lisinopril Angioedema    Other Medication Rash and Itching     Patient reports she is allergic to the PPD test for TB    Pcn [Penicillins] Swelling    Pravastatin Myalgia    Sulfa (Sulfonamide Antibiotics) Hives      Lab Results   Component Value Date/Time    WBC 5.1 08/11/2018 09:21 AM    HGB 11.6 08/11/2018 09:21 AM    HCT 33.9 (L) 08/11/2018 09:21 AM    PLATELET 985 65/05/3487 09:21 AM    MCV 90.4 08/11/2018 09:21 AM     Lab Results   Component Value Date/Time    Glucose 95 09/19/2018 11:10 AM    Glucose (POC) 96 04/27/2015 09:57 PM    Microalb/Creat ratio (ug/mg creat.) 62.5 (H) 04/02/2015 09:06 AM    LDL, calculated 101 (H) 02/16/2018 09:29 AM    Creatinine 0.94 09/19/2018 11:10 AM      Lab Results   Component Value Date/Time    Cholesterol, total 185 02/16/2018 09:29 AM    HDL Cholesterol 68 02/16/2018 09:29 AM    LDL, calculated 101 (H) 02/16/2018 09:29 AM    Triglyceride 79 02/16/2018 09:29 AM     Lab Results   Component Value Date/Time    GFR est non-AA 60 09/19/2018 11:10 AM    GFR est AA 69 09/19/2018 11:10 AM    Creatinine 0.94 09/19/2018 11:10 AM    BUN 11 09/19/2018 11:10 AM    Sodium 143 09/19/2018 11:10 AM    Potassium 4.3 09/19/2018 11:10 AM    Chloride 103 09/19/2018 11:10 AM    CO2 20 09/19/2018 11:10 AM    Magnesium 1.7 02/07/2010 03:30 AM        ROS    Physical Exam   Constitutional: She appears well-developed and well-nourished. Appears stated age   Cardiovascular: Normal rate, regular rhythm and normal heart sounds. Exam reveals no gallop and no friction rub. No murmur heard. Pulmonary/Chest: Effort normal and breath sounds normal. No respiratory distress. She has no wheezes. Abdominal: Soft. Bowel sounds are normal.   Musculoskeletal: She exhibits no edema. Neurological: She is alert. Psychiatric: She has a normal mood and affect. Nursing note and vitals reviewed. ASSESSMENT and PLAN  Diagnoses and all orders for this visit:    1. Post herpetic neuralgia  -     traMADol (ULTRAM) 50 mg tablet; Take 1 Tab by mouth every six (6) hours as needed for Pain. Max Daily Amount: 200 mg. Increase neurontin 300mg bid   Tramadol hs prn 30/nr  2. Essential hypertension   sbp elevatted   Increase adalot to 30 mg 2 every day   Continue hydralazine   ? Restart avapro--last creatinine wnl   F/u Dr Cande Wang next month  3. Edema of both legs--chronic venous insufficiency,amlodipine   Elevate, stocking prn  4. Bilateral renal artery stenosis (HCC)--s/p PTA/stenting 7/10/15 [facility]    5. 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    6. H/O gastroesophageal reflux (GERD)    7.  Encounter for immunization  -     INFLUENZA VACCINE INACTIVATED (IIV), SUBUNIT, ADJUVANTED, IM    Other orders  -     NIFEdipine ER (ADALAT CC) 30 mg ER tablet; Take 2 Tabs by mouth daily.  -     gabapentin (NEURONTIN) 300 mg capsule; Take 1 Cap by mouth two (2) times a day. Follow-up Disposition:  Return in about 4 months (around 4/11/2019) for f/u htn hld.

## 2018-12-14 ENCOUNTER — TELEPHONE (OUTPATIENT)
Dept: INTERNAL MEDICINE CLINIC | Age: 73
End: 2018-12-14

## 2018-12-14 NOTE — TELEPHONE ENCOUNTER
Spoke with patients pharmacist after 2 patient identifiers being note and advised that they did not need a clarification. Patient expressed understanding and has no further questions at this time.

## 2019-01-29 ENCOUNTER — OFFICE VISIT (OUTPATIENT)
Dept: CARDIOLOGY CLINIC | Age: 74
End: 2019-01-29

## 2019-01-29 VITALS
DIASTOLIC BLOOD PRESSURE: 80 MMHG | OXYGEN SATURATION: 99 % | RESPIRATION RATE: 16 BRPM | SYSTOLIC BLOOD PRESSURE: 140 MMHG | WEIGHT: 182.8 LBS | HEIGHT: 67 IN | HEART RATE: 71 BPM | BODY MASS INDEX: 28.69 KG/M2

## 2019-01-29 DIAGNOSIS — R60.0 EDEMA OF BOTH LEGS: ICD-10-CM

## 2019-01-29 DIAGNOSIS — I10 ESSENTIAL HYPERTENSION, MALIGNANT: Primary | ICD-10-CM

## 2019-01-29 DIAGNOSIS — E78.00 HIGH CHOLESTEROL: ICD-10-CM

## 2019-01-29 NOTE — PROGRESS NOTES
[facility]  
[facility] [place] [address], [address] Nicolás C[place]ato. [address] NEW PATIENT HPI/FOLLOW-UP 
 
 
NAME:  Kathryn Trinidad :   1945 MRN:   [id number] PCP:  Muriel Novoa MD 
 
    
 
Subjective: The patient is a 75y.o. year old female  who returns for a routine follow-up. Since the last visit, patient reports no new symptoms. Less stress at home due to change in life style. Has decided \"to pay attention to myself\".  who is partially incapacitate is now \"doing more for himself\". Denies change in exercise tolerance, chest pain, edema, medication intolerance, palpitations, shortness of breath, PND/orthopnea wheezing, sputum, syncope, dizziness or light headedness. Doing satisfactorily. Review of SystemsGeneral: Pt denies excessive weight gain or loss. Pt is able to conduct ADL's. Respiratory: Denies shortness of breath, EGAN, wheezing or stridor. Cardiovascular: Denies precordial pain, palpitations, edema or PND Gastrointestinal: Denies poor appetite, indigestion, abdominal pain or blood in stool Peripheral vascular: Denies claudication, leg cramps Neuropsychiatric: Denies paresthesias,tingling,numbness,anxiety,depression,fatigue Musculoskeletal: Denies pain,tenderness, soreness,swelling Past Medical History:  
Diagnosis Date  Arthritis   
 hands  Asthma  Essential hypertension  GERD (gastroesophageal reflux disease)  High cholesterol  HTN (hypertension)  Ill-defined condition   
 hyperlipidemia  Left ventricular hypertrophy due to hypertensive disease  Renal artery stenosis (HCC)  Shingles 2018 Patient Active Problem List  
 Diagnosis Date Noted  Essential hypertension, malignant 2015 Priority: 1 - One  
  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  
Procedure Laterality Date  HX BREAST BIOPSY Left 20 years ago  
 negative  HX CATARACT REMOVAL  07/2018  
 right eye  HX GI    
 open inquinal hernia repair  HX GI    
 6/29/18:  pt reports mulitple abdominal surgeries but unable to recall exact types  HX GYN    
 tubal ligation  HX HEART CATHETERIZATION  2000  
 normal  
 HX UROLOGICAL Bilateral Renal stents. Allergies Allergen Reactions  Amlodipine Other (comments) Itching, Muscle cramps  Clonidine Swelling  Ibuprofen Swelling  Levaquin [Levofloxacin] Unknown (comments)  Lisinopril Angioedema  Other Medication Rash and Itching Patient reports she is allergic to the PPD test for TB  
 Pcn [Penicillins] Swelling  Pravastatin Myalgia  Sulfa (Sulfonamide Antibiotics) Hives Family History Problem Relation Age of Onset  Cancer Mother  Heart Disease Mother  Heart Disease Father  Hypertension Father  Lung Disease Father  Hypertension Brother  Diabetes Brother  Heart Disease Brother  Kidney Disease Brother  Hypertension Brother Social History Socioeconomic History  Marital status:  Spouse name: Not on file  Number of children: Not on file  Years of education: Not on file  Highest education level: Not on file Social Needs  Financial resource strain: Not on file  Food insecurity - worry: Not on file  Food insecurity - inability: Not on file  Transportation needs - medical: Not on file  Transportation needs - non-medical: Not on file Occupational History  Not on file Tobacco Use  Smoking status: Never Smoker  Smokeless tobacco: Never Used Substance and Sexual Activity  Alcohol use: No  
  Comment: Drinks Mover  Drug use: Yes Types: Prescription, OTC Comment: perocet  Sexual activity: Not Currently Partners: Male Other Topics Concern  Not on file Social History Narrative  Not on file Current Outpatient Medications Medication Sig  
 NIFEdipine ER (ADALAT CC) 30 mg ER tablet Take 2 Tabs by mouth daily.  gabapentin (NEURONTIN) 300 mg capsule Take 1 Cap by mouth two (2) times a day.  potassium chloride SR (KLOR-CON 10) 10 mEq tablet TAKE 1 TABLET BY MOUTH TWICE DAILY  rosuvastatin (CRESTOR) 5 mg tablet TAKE 1 TABLET BY MOUTH EVERY OTHER DAY  hydrALAZINE (APRESOLINE) 50 mg tablet TAKE 1 TABLET BY MOUTH THREE TIMES DAILY (Patient taking differently: Taking 1 tablet twice daily)  beclomethasone (QVAR) 80 mcg/actuation aero Take 1 Puff by inhalation daily as needed.  acetaminophen (TYLENOL) 500 mg tablet Take 500 mg by mouth every six (6) hours as needed for Pain.  aspirin delayed-release 81 mg tablet Take 81 mg by mouth daily. Indications: PT states stopping 7/22-7/26 for Eye Surgery  ranitidine (ZANTAC) 150 mg tablet Take 150 mg by mouth two (2) times daily as needed.  traMADol (ULTRAM) 50 mg tablet Take 1 Tab by mouth every six (6) hours as needed for Pain. Max Daily Amount: 200 mg. No current facility-administered medications for this visit. I have reviewed the nurses notes, vitals, problem list, allergy list, medical history, family medical, social history and medications. Objective:  
 
Physical Exam:  
 
Vitals:  
 01/29/19 0212 01/29/19 0792 BP: 144/80 140/80 Pulse: 71 Resp: 16 SpO2: 99% Weight: 182 lb 12.8 oz (82.9 kg) Height: 5' 7\" (1.702 m) Body mass index is 28.63 kg/m². General: Well developed, in no acute distress. HEENT: No carotid bruits, no JVD, trach is midline. Heart:  Normal S1/S2 negative S3 or S4. Regular, no murmur, gallop or rub.  
Respiratory: Clear bilaterally, no wheezing or rales Abdomen:   Soft, non-tender, bowel sounds are active.  
Extremities:  No edema, normal cap refill, no cyanosis. Neuro: A&Ox3, speech clear, gait stable. Skin: Skin color is normal. No rashes or lesions. No diaphoresis. Vascular: 2+ pulses symmetric in all extremities Data Review:  
 
 
Cardiographics: 
 
EKG: NSR,very minor non-specific ST-T wave changes Cardiology Labs: 
 
Results for orders placed or performed during the hospital encounter of 08/11/18 EKG, 12 LEAD, INITIAL Result Value Ref Range Ventricular Rate 72 BPM  
 Atrial Rate 72 BPM  
 P-R Interval 188 ms QRS Duration 72 ms Q-T Interval 390 ms QTC Calculation (Bezet) 427 ms Calculated P Axis 55 degrees Calculated R Axis 11 degrees Calculated T Axis 39 degrees Diagnosis Normal sinus rhythm Nonspecific T wave abnormality When compared with ECG of 25-APR-2015 22:53, No significant change Confirmed by Heidy Roberson ([id number]) on 8/12/2018 11:31:12 AM 
  
 
 
Lab Results Component Value Date/Time Cholesterol, total 185 02/16/2018 09:29 AM  
 HDL Cholesterol 68 02/16/2018 09:29 AM  
 LDL, calculated 101 (H) 02/16/2018 09:29 AM  
 Triglyceride 79 02/16/2018 09:29 AM  
 
 
Lab Results Component Value Date/Time  Sodium 143 09/19/2018 11:10 AM  
 Potassium 4.3 09/19/2018 11:10 AM  
 Chloride 103 09/19/2018 11:10 AM  
 CO2 20 09/19/2018 11:10 AM  
 Anion gap 10 08/11/2018 09:21 AM  
 Glucose 95 09/19/2018 11:10 AM  
 BUN 11 09/19/2018 11:10 AM  
 Creatinine 0.94 09/19/2018 11:10 AM  
 BUN/Creatinine ratio 12 09/19/2018 11:10 AM  
 GFR est AA 69 09/19/2018 11:10 AM  
 GFR est non-AA 60 09/19/2018 11:10 AM  
 Calcium 9.9 09/19/2018 11:10 AM  
 Bilirubin, total 0.7 08/11/2018 09:21 AM  
 AST (SGOT) 24 08/11/2018 09:21 AM  
 Alk. phosphatase 312 (H) 08/11/2018 09:21 AM  
 Protein, total 7.5 08/11/2018 09:21 AM  
 Albumin 3.8 08/11/2018 09:21 AM  
 Globulin 3.7 08/11/2018 09:21 AM  
 A-G Ratio 1.0 (L) 08/11/2018 09:21 AM  
 ALT (SGPT) 18 08/11/2018 09:21 AM  
  
 
 
Assessment: ICD-10-CM ICD-9-CM 1. Essential hypertension, malignant I10 401.0 AMB POC EKG ROUTINE W/ 12 LEADS, INTER & REP 2. High cholesterol E78.00 272.0 3. Edema of both legs--chronic venous insufficiency,amlodipine R60.0 782. 3 Discussion: Patient presents at this time stable from a cardiac perspective. BP controlled. Pleased with present cardiac status. Plan: 1. Continue same meds. Lipid profile and labs followed by PCP. 2.Encouraged to exercise to tolerance, lose weight and follow low fat, low cholesterol, low sodium predominantly Plant-based (consider Mediterranean) diet. Call with questions or concerns. Will follow up any test results by phone and/or f/u here in office if needed. Marin Cruz 3.Follow up: 6 months I have discussed the diagnosis with the patient and the intended plan as seen in the above orders. The patient has received an after-visit summary and questions were answered concerning future plans. I have discussed any concerning medication side effects and warnings with the patient as well. Cas Corado MD 
1/29/2019

## 2019-01-29 NOTE — PROGRESS NOTES
Chief Complaint Patient presents with  Cholesterol Problem 6 month follow up 1. Have you been to the ER, urgent care clinic since your last visit? Hospitalized since your last visit? NO 
 
2. Have you seen or consulted any other health care providers outside of the [address] [place] [address] since your last visit? Include any pap smears or colon screening.  NO

## 2019-02-13 RX ORDER — HYDRALAZINE HYDROCHLORIDE 50 MG/1
TABLET, FILM COATED ORAL
Qty: 90 TAB | Refills: 0 | Status: SHIPPED | OUTPATIENT
Start: 2019-02-13 | End: 2019-04-11

## 2019-04-11 ENCOUNTER — HOSPITAL ENCOUNTER (OUTPATIENT)
Dept: LAB | Age: 74
Discharge: HOME | End: 2019-04-11
Payer: MEDICARE

## 2019-04-11 ENCOUNTER — OFFICE VISIT (OUTPATIENT)
Dept: INTERNAL MEDICINE CLINIC | Age: 74
End: 2019-04-11

## 2019-04-11 VITALS
RESPIRATION RATE: 14 BRPM | HEART RATE: 73 BPM | HEIGHT: 67 IN | OXYGEN SATURATION: 90 % | BODY MASS INDEX: 28.56 KG/M2 | TEMPERATURE: 98.1 F | SYSTOLIC BLOOD PRESSURE: 155 MMHG | WEIGHT: 182 LBS | DIASTOLIC BLOOD PRESSURE: 80 MMHG

## 2019-04-11 DIAGNOSIS — I65.23 BILATERAL CAROTID ARTERY STENOSIS: ICD-10-CM

## 2019-04-11 DIAGNOSIS — Z00.00 MEDICARE ANNUAL WELLNESS VISIT, SUBSEQUENT: ICD-10-CM

## 2019-04-11 DIAGNOSIS — E78.00 PURE HYPERCHOLESTEROLEMIA: ICD-10-CM

## 2019-04-11 DIAGNOSIS — B02.29 PHN (POSTHERPETIC NEURALGIA): ICD-10-CM

## 2019-04-11 DIAGNOSIS — I10 ESSENTIAL HYPERTENSION: Primary | ICD-10-CM

## 2019-04-11 DIAGNOSIS — Z78.0 MENOPAUSE: ICD-10-CM

## 2019-04-11 DIAGNOSIS — E55.9 VITAMIN D DEFICIENCY: ICD-10-CM

## 2019-04-11 PROCEDURE — 82306 VITAMIN D 25 HYDROXY: CPT

## 2019-04-11 PROCEDURE — 84443 ASSAY THYROID STIM HORMONE: CPT

## 2019-04-11 PROCEDURE — 85027 COMPLETE CBC AUTOMATED: CPT

## 2019-04-11 PROCEDURE — 36415 COLL VENOUS BLD VENIPUNCTURE: CPT

## 2019-04-11 PROCEDURE — 80053 COMPREHEN METABOLIC PANEL: CPT

## 2019-04-11 PROCEDURE — 80061 LIPID PANEL: CPT

## 2019-04-11 RX ORDER — FUROSEMIDE 20 MG/1
20 TABLET ORAL DAILY
Qty: 90 TAB | Refills: 3 | Status: SHIPPED | OUTPATIENT
Start: 2019-04-11 | End: 2020-04-25

## 2019-04-11 NOTE — PATIENT INSTRUCTIONS
Medicare Wellness Visit, Female The best way to live healthy is to have a lifestyle where you eat a well-balanced diet, exercise regularly, limit alcohol use, and quit all forms of tobacco/nicotine, if applicable. Regular preventive services are another way to keep healthy. Preventive services (vaccines, screening tests, monitoring & exams) can help personalize your care plan, which helps you manage your own care. Screening tests can find health problems at the earliest stages, when they are easiest to treat. [facility] follows the current, evidence-based guidelines published by the [facility]ust[facility] Shaggy ([facility]) when recommending preventive services for our patients. Because we follow these guidelines, sometimes recommendations change over time as research supports it. (For example, mammograms used to be recommended annually. Even though Medicare will still pay for an annual mammogram, the newer guidelines recommend a mammogram every two years for women of average risk.) Of course, you and your doctor may decide to screen more often for some diseases, based on your risk and your health status. Preventive services for you include: - Medicare offers their members a free annual wellness visit, which is time for you and your primary care provider to discuss and plan for your preventive service needs. Take advantage of this benefit every year! 
-All adults over the age of 72 should receive the recommended pneumonia vaccines. Current USPSTF guidelines recommend a series of two vaccines for the best pneumonia protection.  
-All adults should have a flu vaccine yearly and a tetanus vaccine every 10 years. All adults age 61 and older should receive a shingles vaccine once in their lifetime.   
-A bone mass density test is recommended when a woman turns 65 to screen for osteoporosis. This test is only recommended one time, as a screening. Some providers will use this same test as a disease monitoring tool if you already have osteoporosis. -All adults age 38-68 who are overweight should have a diabetes screening test once every three years.  
-Other screening tests and preventive services for persons with diabetes include: an eye exam to screen for diabetic retinopathy, a kidney function test, a foot exam, and stricter control over your cholesterol.  
-Cardiovascular screening for adults with routine risk involves an electrocardiogram (ECG) at intervals determined by your doctor.  
-Colorectal cancer screenings should be done for adults age 54-65 with no increased risk factors for colorectal cancer. There are a number of acceptable methods of screening for this type of cancer. Each test has its own benefits and drawbacks. Discuss with your doctor what is most appropriate for you during your annual wellness visit. The different tests include: colonoscopy (considered the best screening method), a fecal occult blood test, a fecal DNA test, and sigmoidoscopy. -Breast cancer screenings are recommended every other year for women of normal risk, age 54-69. 
-Cervical cancer screenings for women over age 72 are only recommended with certain risk factors.  
-All adults born between [facility][1] should be screened once for Hepatitis C. Here is a list of your current Health Maintenance items (your personalized list of preventive services) with a due date: 
Health Maintenance Due Topic Date Due  Shingles Vaccine (1 of 2) 01/31/1994  Glaucoma Screening   01/31/2009  Pneumococcal Vaccine (1 of 2 - PCV13) 01/31/2009 [facility] Annual Well Visit  03/30/2019

## 2019-04-11 NOTE — PROGRESS NOTES
HISTORY OF PRESENT ILLNESS Rangel Rubio is a 75 y.o. female. HPI  
 
F/u HTN HLD postherpetic neuralgia. leg edema and medicare wellness-------------- 
Due for PCV 13 and shingrix 
cologuard neg 2017 Last    2018-started due to mild carotid stenosis -crestor qod Sees Dr Marion Golden for chest pain hx Not taking hydralazine d/t night chavarria with this med Has some right foot edema periodically Sees Dr Diane Andrews yearly for b/l KALPESH s/p stents Has allergy symptoms currently Had dexa a few years ago with former PCP dr Javed Garcia Last OV Still has left chest and breast pain but the shingles rash has cleared, burning sensation at nigth left breast 
Mammogram negative recently No longer dizzy excpet if gets up too quickly Slight increase right ankle edema 
  
  
Last OV Admitted to [facility] for syncope d/t orthostasis 8/30/18 R/o for CVA Had SERINA after CTA head and neck with creatinine up to 0.8 on 8/30 and 1.65 on 9/1/18 Hx mild CKD-saw Dr Maged Gonzalez in the past per pt Hx  KALPESH sp stent 2015 Held all medicines excpet aspirin 81 mg every day and nifedipine 30 mg every day 
bp up slightly at home 
  
Dx with pagets disease-elevated alk phos -no bone pain 
  
 
 
 
Patient Active Problem List  
 Diagnosis Date Noted  Essential hypertension, malignant 04/26/2015 Priority: 1 - One  
  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  
 NIFEdipine ER (ADALAT CC) 30 mg ER tablet Take 2 Tabs by mouth daily. 180 Tab 3  
 gabapentin (NEURONTIN) 300 mg capsule Take 1 Cap by mouth two (2) times a day. 180 Cap 3  
 traMADol (ULTRAM) 50 mg tablet Take 1 Tab by mouth every six (6) hours as needed for Pain. Max Daily Amount: 200 mg. 30 Tab 0  
 potassium chloride SR (KLOR-CON 10) 10 mEq tablet TAKE 1 TABLET BY MOUTH TWICE DAILY 60 Tab 6  
 rosuvastatin (CRESTOR) 5 mg tablet TAKE 1 TABLET BY MOUTH EVERY OTHER DAY 15 Tab 11  
 beclomethasone (QVAR) 80 mcg/actuation aero Take 1 Puff by inhalation daily as needed. 1 Inhaler 11  
 acetaminophen (TYLENOL) 500 mg tablet Take 500 mg by mouth every six (6) hours as needed for Pain.  aspirin delayed-release 81 mg tablet Take 81 mg by mouth daily. Indications: PT states stopping 7/22-7/26 for Eye Surgery  ranitidine (ZANTAC) 150 mg tablet Take 150 mg by mouth two (2) times daily as needed. Allergies Allergen Reactions  Amlodipine Other (comments) Itching, Muscle cramps  Clonidine Swelling  Ibuprofen Swelling  Levaquin [Levofloxacin] Unknown (comments)  Lisinopril Angioedema  Other Medication Rash and Itching Patient reports she is allergic to the PPD test for TB  
 Pcn [Penicillins] Swelling  Pravastatin Myalgia  Sulfa (Sulfonamide Antibiotics) Hives Lab Results Component Value Date/Time WBC 5.1 08/11/2018 09:21 AM  
 HGB 11.6 08/11/2018 09:21 AM  
 HCT 33.9 (L) 08/11/2018 09:21 AM  
 PLATELET 275 96/76/0978 09:21 AM  
 MCV 90.4 08/11/2018 09:21 AM  
 
Lab Results Component Value Date/Time  Glucose 95 09/19/2018 11:10 AM  
 Glucose (POC) 96 04/27/2015 09:57 PM  
 Microalb/Creat ratio (ug/mg creat.) 62.5 (H) 04/02/2015 09:06 AM  
 LDL, calculated 101 (H) 02/16/2018 09:29 AM  
 Creatinine 0.94 09/19/2018 11:10 AM  
  
Lab Results Component Value Date/Time Cholesterol, total 185 02/16/2018 09:29 AM  
 HDL Cholesterol 68 02/16/2018 09:29 AM  
 LDL, calculated 101 (H) 02/16/2018 09:29 AM  
 Triglyceride 79 02/16/2018 09:29 AM  
 
Lab Results Component Value Date/Time GFR est non-AA 60 09/19/2018 11:10 AM  
 GFR est AA 69 09/19/2018 11:10 AM  
 Creatinine 0.94 09/19/2018 11:10 AM  
 BUN 11 09/19/2018 11:10 AM  
 Sodium 143 09/19/2018 11:10 AM  
 Potassium 4.3 09/19/2018 11:10 AM  
 Chloride 103 09/19/2018 11:10 AM  
 CO2 20 09/19/2018 11:10 AM  
 Magnesium 1.7 02/07/2010 03:30 AM  
  
ROS Physical Exam  
Constitutional: She appears well-developed and well-nourished. Appears stated age Cardiovascular: Normal rate, regular rhythm and normal heart sounds. Exam reveals no gallop and no friction rub. No murmur heard. Pulmonary/Chest: Effort normal and breath sounds normal. No respiratory distress. She has no wheezes. Abdominal: Soft. Bowel sounds are normal.  
Musculoskeletal: She exhibits no edema. Neurological: She is alert. Psychiatric: She has a normal mood and affect. Nursing note and vitals reviewed. ASSESSMENT and PLAN Diagnoses and all orders for this visit: 1. Essential hypertension 
-     CBC W/O DIFF 
-     METABOLIC PANEL, COMPREHENSIVE 2. Pure hypercholesterolemia -     METABOLIC PANEL, COMPREHENSIVE 
-     LIPID PANEL 
-     TSH 3RD GENERATION 3. PHN (postherpetic neuralgia) Other orders -     furosemide (LASIX) 20 mg tablet; Take 1 Tab by mouth daily. This is the Subsequent Medicare Annual Wellness Exam, performed 12 months or more after the Initial AWV or the last Subsequent AWV I have reviewed the patient's medical history in detail and updated the computerized patient record. History Past Medical History:  
Diagnosis Date  Arthritis   
 hands  Asthma  Essential hypertension  GERD (gastroesophageal reflux disease)  High cholesterol  HTN (hypertension)  Ill-defined condition   
 hyperlipidemia  Left ventricular hypertrophy due to hypertensive disease  Renal artery stenosis (HCC)  Shingles 08/11/2018 Past Surgical History:  
Procedure Laterality Date  HX BREAST BIOPSY Left 20 years ago  
 negative  HX CATARACT REMOVAL  07/2018  
 right eye  HX GI    
 open inquinal hernia repair  HX GI    
 6/29/18:  pt reports mulitple abdominal surgeries but unable to recall exact types  HX GYN    
 tubal ligation  HX HEART CATHETERIZATION  2000  
 normal  
 HX UROLOGICAL Bilateral Renal stents. Current Outpatient Medications Medication Sig Dispense Refill  furosemide (LASIX) 20 mg tablet Take 1 Tab by mouth daily. 90 Tab 3  
 NIFEdipine ER (ADALAT CC) 30 mg ER tablet Take 2 Tabs by mouth daily. 180 Tab 3  
 traMADol (ULTRAM) 50 mg tablet Take 1 Tab by mouth every six (6) hours as needed for Pain. Max Daily Amount: 200 mg. 30 Tab 0  
 potassium chloride SR (KLOR-CON 10) 10 mEq tablet TAKE 1 TABLET BY MOUTH TWICE DAILY 60 Tab 6  
 rosuvastatin (CRESTOR) 5 mg tablet TAKE 1 TABLET BY MOUTH EVERY OTHER DAY 15 Tab 11  
 beclomethasone (QVAR) 80 mcg/actuation aero Take 1 Puff by inhalation daily as needed. 1 Inhaler 11  
 acetaminophen (TYLENOL) 500 mg tablet Take 500 mg by mouth every six (6) hours as needed for Pain.  aspirin delayed-release 81 mg tablet Take 81 mg by mouth daily. Indications: PT states stopping 7/22-7/26 for Eye Surgery  ranitidine (ZANTAC) 150 mg tablet Take 150 mg by mouth two (2) times daily as needed.  gabapentin (NEURONTIN) 300 mg capsule Take 1 Cap by mouth two (2) times a day. 180 Cap 3 Allergies Allergen Reactions  Amlodipine Other (comments) Itching, Muscle cramps  Clonidine Swelling  Ibuprofen Swelling  Levaquin [Levofloxacin] Unknown (comments)  Lisinopril Angioedema  Other Medication Rash and Itching Patient reports she is allergic to the PPD test for TB  
 Pcn [Penicillins] Swelling  Pravastatin Myalgia  Sulfa (Sulfonamide Antibiotics) Hives Family History Problem Relation Age of Onset  Cancer Mother  Heart Disease Mother  Heart Disease Father  Hypertension Father  Lung Disease Father  Hypertension Brother  Diabetes Brother  Heart Disease Brother  Kidney Disease Brother  Hypertension Brother Social History Tobacco Use  Smoking status: Never Smoker  Smokeless tobacco: Never Used Substance Use Topics  Alcohol use: No  
  Comment: Drinks POKKT Patient Active Problem List  
Diagnosis Code  High cholesterol E78.00  Left ventricular hypertrophy due to hypertensive disease I11.9  Obesity E66.9  
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  
 H/O gastroesophageal reflux (GERD) Z87.19  
 Essential hypertension, malignant I10  Stress at home F43.9  Bilateral renal artery stenosis (HCC)--s/p PTA/stenting 7/10/15 [facility] I70.1  History of tubal ligation Z98.51  
 Carotid bruit present R09.89  Bilateral carotid artery stenosis I65.23  
 Paget's bone disease M88.9  Syncope and collapse R55  Syncope due to orthostatic hypotension I95.1 Depression Risk Factor Screening:  
 
3 most recent PHQ Screens 4/11/2019 Little interest or pleasure in doing things Not at all Feeling down, depressed, irritable, or hopeless Not at all Total Score PHQ 2 0 Alcohol Risk Factor Screening: You do not drink alcohol or very rarely. Functional Ability and Level of Safety:  
Hearing Loss Hearing is good. Activities of Daily Living The home contains: handrails Patient does total self care Fall Risk Fall Risk Assessment, last 12 mths 4/11/2019 Able to walk?  Yes  
 Fall in past 12 months? No  
Fall with injury? -  
Number of falls in past 12 months - Fall Risk Score -  
 
 
Abuse Screen Patient is not abused Cognitive Screening Evaluation of Cognitive Function: 
Has your family/caregiver stated any concerns about your memory: no 
Normal 
 
Patient Care Team  
Patient Care Team: 
Zora Koo MD as PCP - General (Internal Medicine) Malissa Shen MD as Physician (Cardiology) Rocael Sánchez, RN as Nurse Navigator Eugene Ybarra ANP as Nurse Practitioner (Nurse Practitioner) Richi Neri MD (Ophthalmology) Assessment/Plan Education and counseling provided: 
Are appropriate based on today's review and evaluation End-of-Life planning (with patient's consent)-advised completion of AMD forms Pneumococcal Vaccine-recommended pcv 13 
shingrix recommended Dexa ordered Diagnoses and all orders for this visit: 1. Essential hypertension 
-     CBC W/O DIFF 
-     METABOLIC PANEL, COMPREHENSIVE 
 controlled 2. Pure hypercholesterolemia -     METABOLIC PANEL, COMPREHENSIVE 
-     LIPID PANEL 
-     TSH 3RD GENERATION Continue statin 3. PHN (postherpetic neuralgia) Controlled on neurontin 4. Edema Lasix 20 mg every day prn Other orders -     furosemide (LASIX) 20 mg tablet; Take 1 Tab by mouth daily. Health Maintenance Due Topic Date Due  Shingrix Vaccine Age 50> (1 of 2) 01/31/1994  GLAUCOMA SCREENING Q2Y  01/31/2009  Pneumococcal 65+ years (1 of 2 - PCV13) 01/31/2009  MEDICARE YEARLY EXAM  03/30/2019

## 2019-04-11 NOTE — PROGRESS NOTES
Chief Complaint Patient presents with  Cholesterol Problem 4 month follow up  Hypertension 4 month follow up  Labs Non fasting  Arm Pain  
  right arm pain with ROM

## 2019-04-12 PROBLEM — E55.9 VITAMIN D DEFICIENCY: Status: ACTIVE | Noted: 2019-04-12

## 2019-04-12 LAB
25(OH)D3+25(OH)D2 SERPL-MCNC: 22.2 NG/ML (ref 30–100)
ALBUMIN SERPL-MCNC: 4.3 G/DL (ref 3.5–4.8)
ALBUMIN/GLOB SERPL: 1.4 {RATIO} (ref 1.2–2.2)
ALP SERPL-CCNC: 298 IU/L (ref 39–117)
ALT SERPL-CCNC: 12 IU/L (ref 0–32)
AST SERPL-CCNC: 17 IU/L (ref 0–40)
BILIRUB SERPL-MCNC: 0.2 MG/DL (ref 0–1.2)
BUN SERPL-MCNC: 16 MG/DL (ref 8–27)
BUN/CREAT SERPL: 14 (ref 12–28)
CALCIUM SERPL-MCNC: 9.4 MG/DL (ref 8.7–10.3)
CHLORIDE SERPL-SCNC: 101 MMOL/L (ref 96–106)
CHOLEST SERPL-MCNC: 211 MG/DL (ref 100–199)
CO2 SERPL-SCNC: 24 MMOL/L (ref 20–29)
CREAT SERPL-MCNC: 1.13 MG/DL (ref 0.57–1)
ERYTHROCYTE [DISTWIDTH] IN BLOOD BY AUTOMATED COUNT: 14.3 % (ref 12.3–15.4)
GLOBULIN SER CALC-MCNC: 3.1 G/DL (ref 1.5–4.5)
GLUCOSE SERPL-MCNC: 102 MG/DL (ref 65–99)
HCT VFR BLD AUTO: 37.6 % (ref 34–46.6)
HDLC SERPL-MCNC: 64 MG/DL
HGB BLD-MCNC: 12.7 G/DL (ref 11.1–15.9)
LDLC SERPL CALC-MCNC: 126 MG/DL (ref 0–99)
MCH RBC QN AUTO: 30.1 PG (ref 26.6–33)
MCHC RBC AUTO-ENTMCNC: 33.8 G/DL (ref 31.5–35.7)
MCV RBC AUTO: 89 FL (ref 79–97)
PLATELET # BLD AUTO: 304 X10E3/UL (ref 150–379)
POTASSIUM SERPL-SCNC: 4.4 MMOL/L (ref 3.5–5.2)
PROT SERPL-MCNC: 7.4 G/DL (ref 6–8.5)
RBC # BLD AUTO: 4.22 X10E6/UL (ref 3.77–5.28)
SODIUM SERPL-SCNC: 140 MMOL/L (ref 134–144)
TRIGL SERPL-MCNC: 105 MG/DL (ref 0–149)
TSH SERPL DL<=0.005 MIU/L-ACNC: 2.22 UIU/ML (ref 0.45–4.5)
VLDLC SERPL CALC-MCNC: 21 MG/DL (ref 5–40)
WBC # BLD AUTO: 6.5 X10E3/UL (ref 3.4–10.8)

## 2019-04-13 NOTE — PROGRESS NOTES
Tell pt normal blood cell counts, stable kidney function Cholesterol levels are mildly elevated--advoise to take crestor every day instead of every other day--call in new rx for 30 tabs qmonth if willing to take every day Vit d is low--advise to take otcv vit d 1000 iu qd

## 2019-04-15 RX ORDER — ROSUVASTATIN CALCIUM 5 MG/1
5 TABLET, COATED ORAL DAILY
Qty: 90 TAB | Refills: 3 | Status: SHIPPED | OUTPATIENT
Start: 2019-04-15 | End: 2020-04-25

## 2019-04-15 NOTE — PROGRESS NOTES
Patient has been notified of  Recent lab results and advised per Dr. Neftali Hernández Orders patient agreed to follow up his instructions.

## 2019-04-15 NOTE — TELEPHONE ENCOUNTER
Requested Prescriptions     Pending Prescriptions Disp Refills    rosuvastatin (CRESTOR) 5 mg tablet 90 Tab 3     Sig: Take 1 Tab by mouth daily. Indications: excessive fat in the blood      PCP: Sumanth Fitzgerald MD    Last appt: 4/11/2019  Future Appointments   Date Time Provider Alvina Destiny   4/22/2019 11:00 AM CLARENCE US 2 [address] [place]   4/22/2019  1:00 PM [facility] DEXA 1 [address] [facility] [place]   7/30/2019  9:15 AM Dg Mendenhall MD [facility]   10/11/2019 11:30 AM Sumanth Fitzgerald MD [facility] [address]       Requested Prescriptions     Pending Prescriptions Disp Refills    rosuvastatin (CRESTOR) 5 mg tablet 90 Tab 3     Sig: Take 1 Tab by mouth daily.  Indications: excessive fat in the blood

## 2019-04-15 NOTE — PROGRESS NOTES
Spoke with patient using two identifiers Name and date of birth. Patient was notified of recent labs and advised per Dr. Willard García. Recommendations . Patient is in agreement to take crestor daily. Patient verbalized understanding.

## 2019-04-22 ENCOUNTER — HOSPITAL ENCOUNTER (OUTPATIENT)
Dept: BONE DENSITY | Age: 74
Discharge: HOME | End: 2019-04-22
Attending: INTERNAL MEDICINE
Payer: MEDICARE

## 2019-04-22 ENCOUNTER — HOSPITAL ENCOUNTER (OUTPATIENT)
Dept: ULTRASOUND IMAGING | Age: 74
Discharge: HOME | End: 2019-04-22
Attending: INTERNAL MEDICINE
Payer: MEDICARE

## 2019-04-22 DIAGNOSIS — I65.23 BILATERAL CAROTID ARTERY STENOSIS: ICD-10-CM

## 2019-04-22 DIAGNOSIS — M85.859 OSTEOPENIA OF HIP, UNSPECIFIED LATERALITY: ICD-10-CM

## 2019-04-22 DIAGNOSIS — Z78.0 MENOPAUSE: ICD-10-CM

## 2019-04-22 PROCEDURE — 77080 DXA BONE DENSITY AXIAL: CPT

## 2019-04-22 PROCEDURE — 93880 EXTRACRANIAL BILAT STUDY: CPT

## 2019-04-22 NOTE — PROGRESS NOTES
Tell pt seh has mild loss of bone density of the hips ( osteopenia) . Has low 10 yr fracture risk. Should take otc calcium 500 mg bid and mvi every day and regular weight bearing exercise. Repeat dexa scan in 2-3 yrs.

## 2019-08-13 ENCOUNTER — OFFICE VISIT (OUTPATIENT)
Dept: CARDIOLOGY CLINIC | Age: 74
End: 2019-08-13

## 2019-08-13 VITALS
OXYGEN SATURATION: 99 % | RESPIRATION RATE: 16 BRPM | SYSTOLIC BLOOD PRESSURE: 140 MMHG | DIASTOLIC BLOOD PRESSURE: 80 MMHG | WEIGHT: 179.4 LBS | BODY MASS INDEX: 28.16 KG/M2 | HEIGHT: 67 IN | HEART RATE: 64 BPM

## 2019-08-13 DIAGNOSIS — R68.81 EARLY SATIETY: ICD-10-CM

## 2019-08-13 DIAGNOSIS — N18.30 CKD (CHRONIC KIDNEY DISEASE) STAGE 3, GFR 30-59 ML/MIN (HCC): ICD-10-CM

## 2019-08-13 DIAGNOSIS — I10 ESSENTIAL HYPERTENSION, MALIGNANT: Primary | ICD-10-CM

## 2019-08-13 DIAGNOSIS — E78.00 HIGH CHOLESTEROL: ICD-10-CM

## 2019-08-13 DIAGNOSIS — Z87.19 H/O GASTROESOPHAGEAL REFLUX (GERD): ICD-10-CM

## 2019-08-13 RX ORDER — MELATONIN
DAILY
COMMUNITY

## 2019-08-13 RX ORDER — NIFEDIPINE 60 MG/1
60 TABLET, EXTENDED RELEASE ORAL DAILY
COMMUNITY
Start: 2019-08-10 | End: 2020-01-14 | Stop reason: SDUPTHER

## 2019-08-13 NOTE — PROGRESS NOTES
[facility]   [address] [facility], [address] [place] [address] Road                                          NEW PATIENT HPI/FOLLOW-UP      NAME:  Melissa Cardona   :   1945   MRN:   [id number]   PCP:  Ronnie Christy MD           Subjective: The patient is a 75y.o. year old female  who returns for a routine follow-up. Since the last visit, patient reports no new cardiac symptoms. Occasional arthritic pains all over. Seems to be tolerating Crestor 5 mg every day without symptoms. Reports early satiety. Eats smaller portions now. Denies change in exercise tolerance, chest pain, edema, medication intolerance, palpitations, shortness of breath, PND/orthopnea wheezing, sputum, syncope, dizziness or light headedness. Doing satisfactorily. Review of Systems  General: Pt denies excessive weight gain or loss. Pt is able to conduct ADL's. Respiratory: Denies shortness of breath, EGAN, wheezing or stridor.   Cardiovascular: Denies precordial pain, palpitations, edema or PND  Gastrointestinal: Denies poor appetite, indigestion, abdominal pain or blood in stool  Peripheral vascular: Denies claudication, leg cramps  Neuropsychiatric: Denies paresthesias,tingling,numbness,anxiety,depression,fatigue  Musculoskeletal: Denies pain,tenderness, soreness,swelling      Past Medical History:   Diagnosis Date    Arthritis     hands    Asthma     Essential hypertension     GERD (gastroesophageal reflux disease)     High cholesterol     HTN (hypertension)     Ill-defined condition     hyperlipidemia    Left ventricular hypertrophy due to hypertensive disease     Renal artery stenosis ([facility] [address]     Shingles 2018     Patient Active Problem List    Diagnosis Date Noted    Essential hypertension, malignant 2015     Priority: 1 - One     Class: Present on Admission    Osteopenia of hip 2019    Vitamin D deficiency 2019    Syncope and collapse 2018    Syncope due to orthostatic hypotension 09/30/2018    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    Carotid bruit present 12/05/2017    Bilateral carotid artery stenosis 12/05/2017    History of tubal ligation 09/14/2015    Bilateral renal artery stenosis (HCC)--s/p PTA/stenting 7/10/15 [facility] 08/09/2015    Stress at home 06/13/2015    H/O gastroesophageal reflux (GERD) 03/12/2015    Obesity 09/04/2013    Edema of both legs--chronic venous insufficiency,amlodipine 09/04/2013    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      Allergies   Allergen Reactions    Amlodipine Other (comments)     Itching,  Muscle cramps    Clonidine Swelling    Ibuprofen Swelling    Levaquin [Levofloxacin] Unknown (comments)    Lisinopril Angioedema    Other Medication Rash and Itching     Patient reports she is allergic to the PPD test for TB    Pcn [Penicillins] Swelling    Pravastatin Myalgia    Sulfa (Sulfonamide Antibiotics) Hives      Family History   Problem Relation Age of Onset    Cancer Mother     Heart Disease Mother     Heart Disease Father     Hypertension Father     Lung Disease Father     Hypertension Brother     Diabetes Brother     Heart Disease Brother     Kidney Disease Brother     Hypertension Brother       Social History     Socioeconomic History    Marital status:      Spouse name: Not on file    Number of children: Not on file    Years of education: Not on file    Highest education level: Not on file Occupational History    Not on file   Social Needs    Financial resource strain: Not on file    Food insecurity:     Worry: Not on file     Inability: Not on file    Transportation needs:     Medical: Not on file     Non-medical: Not on file   Tobacco Use    Smoking status: Never Smoker    Smokeless tobacco: Never Used   Substance and Sexual Activity    Alcohol use: No     Comment: Drinks Lite Beer    Drug use: Yes     Types: Prescription, OTC     Comment: perocet    Sexual activity: Not Currently     Partners: Male   Lifestyle    Physical activity:     Days per week: Not on file     Minutes per session: Not on file    Stress: Not on file   Relationships    Social connections:     Talks on phone: Not on file     Gets together: Not on file     Attends Quaker service: Not on file     Active member of club or organization: Not on file     Attends meetings of clubs or organizations: Not on file     Relationship status: Not on file    Intimate partner violence:     Fear of current or ex partner: Not on file     Emotionally abused: Not on file     Physically abused: Not on file     Forced sexual activity: Not on file   Other Topics Concern    Not on file   Social History Narrative    Not on file      Current Outpatient Medications   Medication Sig    NIFEdipine ER (ADALAT CC) 60 mg ER tablet Take 60 mg by mouth daily.  cholecalciferol (VITAMIN D3) 1,000 unit tablet Take  by mouth daily.  QVAR 80 mcg/actuation aero INHALE 1 PUFF BY MOUTH ONCE DAILY AS NEEDED    rosuvastatin (CRESTOR) 5 mg tablet Take 1 Tab by mouth daily. Indications: excessive fat in the blood    furosemide (LASIX) 20 mg tablet Take 1 Tab by mouth daily.  potassium chloride SR (KLOR-CON 10) 10 mEq tablet TAKE 1 TABLET BY MOUTH TWICE DAILY    acetaminophen (TYLENOL) 500 mg tablet Take 500 mg by mouth every six (6) hours as needed for Pain.  aspirin delayed-release 81 mg tablet Take 81 mg by mouth daily.  Indications: PT states stopping 7/22-7/26 for Eye Surgery    ranitidine (ZANTAC) 150 mg tablet Take 150 mg by mouth two (2) times daily as needed.  gabapentin (NEURONTIN) 300 mg capsule Take 1 Cap by mouth two (2) times a day. (Patient not taking: Reported on 8/13/2019)    traMADol (ULTRAM) 50 mg tablet Take 1 Tab by mouth every six (6) hours as needed for Pain. Max Daily Amount: 200 mg. (Patient not taking: Reported on 8/13/2019)     No current facility-administered medications for this visit. I have reviewed the nurses notes, vitals, problem list, allergy list, medical history, family medical, social history and medications. Objective:     Physical Exam:     Vitals:    08/13/19 1055 08/13/19 1056   BP: 140/70 140/80   Pulse: 64    Resp: 16    SpO2: 99%    Weight: 179 lb 6.4 oz (81.4 kg)    Height: 5' 7\" (1.702 m)     Body mass index is 28.1 kg/m². General: Well developed, in no acute distress. HEENT: No carotid bruits, no JVD, trach is midline. Heart:  Normal S1/S2 negative S3 or S4. Regular, no murmur, gallop or rub.   Respiratory: Clear bilaterally, no wheezing or rales  Abdomen:   Soft, non-tender, bowel sounds are active.   Extremities:  No edema, normal cap refill, no cyanosis. Neuro: A&Ox3, speech clear, gait stable. Skin: Skin color is normal. No rashes or lesions. No diaphoresis.   Vascular: 2+ pulses symmetric in all extremities        Data Review:       Cardiographics:    EKG: NSR,occasional ventricular trigeminy    Cardiology Labs:    Results for orders placed or performed during the hospital encounter of 08/11/18   EKG, 12 LEAD, INITIAL   Result Value Ref Range    Ventricular Rate 72 BPM    Atrial Rate 72 BPM    P-R Interval 188 ms    QRS Duration 72 ms    Q-T Interval 390 ms    QTC Calculation (Bezet) 427 ms    Calculated P Axis 55 degrees    Calculated R Axis 11 degrees    Calculated T Axis 39 degrees    Diagnosis       Normal sinus rhythm  Nonspecific T wave abnormality  When compared with ECG of 25-APR-2015 22:53,  No significant change  Confirmed by Jessica Concepcion ([id number]) on 8/12/2018 11:31:12 AM         Lab Results   Component Value Date/Time    Cholesterol, total 211 (H) 04/11/2019 12:06 PM    HDL Cholesterol 64 04/11/2019 12:06 PM    LDL, calculated 126 (H) 04/11/2019 12:06 PM    Triglyceride 105 04/11/2019 12:06 PM       Lab Results   Component Value Date/Time    Sodium 140 04/11/2019 12:06 PM    Potassium 4.4 04/11/2019 12:06 PM    Chloride 101 04/11/2019 12:06 PM    CO2 24 04/11/2019 12:06 PM    Anion gap 10 08/11/2018 09:21 AM    Glucose 102 (H) 04/11/2019 12:06 PM    BUN 16 04/11/2019 12:06 PM    Creatinine 1.13 (H) 04/11/2019 12:06 PM    BUN/Creatinine ratio 14 04/11/2019 12:06 PM    GFR est AA 55 (L) 04/11/2019 12:06 PM    GFR est non-AA 48 (L) 04/11/2019 12:06 PM    Calcium 9.4 04/11/2019 12:06 PM    Bilirubin, total 0.2 04/11/2019 12:06 PM    AST (SGOT) 17 04/11/2019 12:06 PM    Alk. phosphatase 298 (H) 04/11/2019 12:06 PM    Protein, total 7.4 04/11/2019 12:06 PM    Albumin 4.3 04/11/2019 12:06 PM    Globulin 3.7 08/11/2018 09:21 AM    A-G Ratio 1.4 04/11/2019 12:06 PM    ALT (SGPT) 12 04/11/2019 12:06 PM          Assessment:       ICD-10-CM ICD-9-CM    1. Essential hypertension, malignant I10 401.0 AMB POC EKG ROUTINE W/ 12 LEADS, INTER & REP   2. Early satiety R68.81 780.94    3. H/O gastroesophageal reflux (GERD) Z87.19 V12.79    4. High cholesterol E78.00 272.0    5. CKD (chronic kidney disease) stage 3, GFR 30-59 ml/min (HCC) N18.3 585. 3          Discussion: Patient presents at this time stable from a cardiac perspective. BP high normal. Continue same. If > 140/90, call. Add Doxazocin. Advised to discuss early satiety with PCP. Pleased with present cardiac status. Plan: 1. Continue same meds. Lipid profile and labs followed by PCP. Add Co-enzyme Q10 200 mg every day as has had myalgias in past.Crestor recently increased. 5 mg qhs.     2.Encouraged to exercise to tolerance, lose weight and follow low fat, low cholesterol, low sodium predominantly Plant-based (consider Mediterranean) diet. Call with questions or concerns. Will follow up any test results by phone and/or f/u here in office if needed. Kathi Del Valle 3.Follow up: 6 months    I have discussed the diagnosis with the patient and the intended plan as seen in the above orders. The patient has received an after-visit summary and questions were answered concerning future plans. I have discussed any concerning medication side effects and warnings with the patient as well.     Karen Smalls MD  8/13/2019

## 2019-08-13 NOTE — PROGRESS NOTES
1. Have you been to the ER, urgent care clinic since your last visit? Hospitalized since your last visit? No    2. Have you seen or consulted any other health care providers outside of the [address] [place] [address] since your last visit? Include any pap smears or colon screening. No    Chief Complaint   Patient presents with    Follow-up    Hypertension     Patient has no cardiac complaints concerned with weight loss questions if can take Biotin vitamin. She is not taking Gabapentin.

## 2019-10-07 NOTE — PROGRESS NOTES
HISTORY OF PRESENT ILLNESS  Corin Krishnan is a 75 y.o. female. HPI      F/u HTN HLD postherpetic neuralgia. leg edema ckd 3 low vit d, mild carotid carotid artery stenosis  Last --was instructed to take crestor every day instead of every other day  Dx osteopenia with low frax by dexa  Started on vit d 1000 iu every day since last OV--vit d 25     is blind, ESRD on HD, difficulty walking--she is PCG-feels depressed sometimes. She gets agitated and frustrated too . Saw vascular Dr Long Schultz hx KALPESH.  Will get CTA tomorrow to evaluate the kidney to check the stents  Saw Dr Musa Jimenez in the past for ckd 3 and HTN    Had flu shot and shingrix last month    Last OV  Due for PCV 13 and shingrix  cologuard neg 2017  Last    2018-started due to mild carotid stenosis -crestor qod  Sees Dr Gary Orn for chest pain hx  Not taking hydralazine d/t night chavarria with this med  Has some right foot edema periodically  Sees Dr Knapp Felt yearly for b/l KALPESH s/p stents     Has allergy symptoms currently     Had dexa a few years ago with former PCP dr Lynette Chandra    Patient Active Problem List    Diagnosis Date Noted    Essential hypertension, malignant 04/26/2015     Priority: 1 - One     Class: Present on Admission    Early satiety 08/13/2019    CKD (chronic kidney disease) stage 3, GFR 30-59 ml/min ([facility] [address] 08/13/2019    Osteopenia of hip 04/22/2019    Vitamin D deficiency 04/12/2019    Syncope and collapse 09/30/2018    Syncope due to orthostatic hypotension 09/30/2018    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    Carotid bruit present 12/05/2017    Bilateral carotid artery stenosis 12/05/2017    History of tubal ligation 09/14/2015    Bilateral renal artery stenosis (HCC)--s/p PTA/stenting 7/10/15 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  cholecalciferol (VITAMIN D3) 1,000 unit tablet Take  by mouth daily.  QVAR 80 mcg/actuation aero INHALE 1 PUFF BY MOUTH ONCE DAILY AS NEEDED 1 Inhaler 0    rosuvastatin (CRESTOR) 5 mg tablet Take 1 Tab by mouth daily. Indications: excessive fat in the blood 90 Tab 3    furosemide (LASIX) 20 mg tablet Take 1 Tab by mouth daily. 90 Tab 3    gabapentin (NEURONTIN) 300 mg capsule Take 1 Cap by mouth two (2) times a day. (Patient not taking: Reported on 8/13/2019) 180 Cap 3    traMADol (ULTRAM) 50 mg tablet Take 1 Tab by mouth every six (6) hours as needed for Pain. Max Daily Amount: 200 mg. (Patient not taking: Reported on 8/13/2019) 30 Tab 0    acetaminophen (TYLENOL) 500 mg tablet Take 500 mg by mouth every six (6) hours as needed for Pain.  aspirin delayed-release 81 mg tablet Take 81 mg by mouth daily. Indications: PT states stopping 7/22-7/26 for Eye Surgery      ranitidine (ZANTAC) 150 mg tablet Take 150 mg by mouth two (2) times daily as needed.        Allergies   Allergen Reactions    Amlodipine Other (comments)     Itching,  Muscle cramps    Clonidine Swelling    Ibuprofen Swelling    Levaquin [Levofloxacin] Unknown (comments)    Lisinopril Angioedema    Other Medication Rash and Itching     Patient reports she is allergic to the PPD test for TB    Pcn [Penicillins] Swelling    Pravastatin Myalgia    Sulfa (Sulfonamide Antibiotics) Hives     Past Medical History:   Diagnosis Date    Arthritis     hands    Asthma     Essential hypertension     GERD (gastroesophageal reflux disease)     High cholesterol     HTN (hypertension)     Ill-defined condition     hyperlipidemia    Left ventricular hypertrophy due to hypertensive disease     Renal artery stenosis (HCC)     Shingles 08/11/2018     Social History     Tobacco Use    Smoking status: Never Smoker    Smokeless tobacco: Never Used   Substance Use Topics    Alcohol use: No     Comment: Drinks Lite Beer      Lab Results   Component Value Date/Time    WBC 6.5 04/11/2019 12:06 PM    HGB 12.7 04/11/2019 12:06 PM    HCT 37.6 04/11/2019 12:06 PM    PLATELET 247 06/25/6679 12:06 PM    MCV 89 04/11/2019 12:06 PM     Lab Results   Component Value Date/Time    Glucose 102 (H) 04/11/2019 12:06 PM    Glucose (POC) 96 04/27/2015 09:57 PM    Microalb/Creat ratio (ug/mg creat.) 62.5 (H) 04/02/2015 09:06 AM    LDL, calculated 126 (H) 04/11/2019 12:06 PM    Creatinine 1.13 (H) 04/11/2019 12:06 PM      Lab Results   Component Value Date/Time    Cholesterol, total 211 (H) 04/11/2019 12:06 PM    HDL Cholesterol 64 04/11/2019 12:06 PM    LDL, calculated 126 (H) 04/11/2019 12:06 PM    Triglyceride 105 04/11/2019 12:06 PM     Lab Results   Component Value Date/Time    GFR est non-AA 48 (L) 04/11/2019 12:06 PM    GFR est AA 55 (L) 04/11/2019 12:06 PM    Creatinine 1.13 (H) 04/11/2019 12:06 PM    BUN 16 04/11/2019 12:06 PM    Sodium 140 04/11/2019 12:06 PM    Potassium 4.4 04/11/2019 12:06 PM    Chloride 101 04/11/2019 12:06 PM    CO2 24 04/11/2019 12:06 PM    Magnesium 1.7 02/07/2010 03:30 AM        ROS    Physical Exam   Constitutional: She appears well-developed and well-nourished. Appears stated age   Cardiovascular: Normal rate, regular rhythm and normal heart sounds. Exam reveals no gallop and no friction rub. No murmur heard. Pulmonary/Chest: Effort normal and breath sounds normal. No respiratory distress. She has no wheezes. Abdominal: Soft. Bowel sounds are normal.   Musculoskeletal: She exhibits no edema. Neurological: She is alert.    Psychiatric: She has a normal mood and affect. Nursing note and vitals reviewed. ASSESSMENT and PLAN  Diagnoses and all orders for this visit:    1. Essential hypertension  -     METABOLIC PANEL, COMPREHENSIVE   controlled  2. Pure hypercholesterolemia  -     LIPID PANEL  -     METABOLIC PANEL, COMPREHENSIVE    3. CKD (chronic kidney disease) stage 3, GFR 30-59 ml/min (HCC)  -     METABOLIC PANEL, COMPREHENSIVE  -     REFERRAL TO NEPHROLOGY-Dr Medellin    4. PHN (postherpetic neuralgia)   No longer on neurontin  5. Low vitamin D level  -     VITAMIN D, 25 HYDROXY  6. MDD   Start zoloft 25 mg every day fro depression and anxiety  Other orders  -     sertraline (ZOLOFT) 25 mg tablet; Take 1 Tab by mouth daily. Follow-up and Dispositions    · Return in about 3 months (around 1/8/2020) for hnt hld ckd 3 anxiety PCV 13.

## 2019-10-08 ENCOUNTER — OFFICE VISIT (OUTPATIENT)
Dept: INTERNAL MEDICINE CLINIC | Age: 74
End: 2019-10-08

## 2019-10-08 ENCOUNTER — HOSPITAL ENCOUNTER (OUTPATIENT)
Dept: LAB | Age: 74
Discharge: HOME | End: 2019-10-08
Payer: MEDICARE

## 2019-10-08 VITALS
DIASTOLIC BLOOD PRESSURE: 65 MMHG | WEIGHT: 180 LBS | SYSTOLIC BLOOD PRESSURE: 138 MMHG | HEIGHT: 67 IN | HEART RATE: 63 BPM | TEMPERATURE: 98.1 F | BODY MASS INDEX: 28.25 KG/M2 | RESPIRATION RATE: 16 BRPM | OXYGEN SATURATION: 98 %

## 2019-10-08 DIAGNOSIS — E78.00 PURE HYPERCHOLESTEROLEMIA: ICD-10-CM

## 2019-10-08 DIAGNOSIS — B02.29 PHN (POSTHERPETIC NEURALGIA): ICD-10-CM

## 2019-10-08 DIAGNOSIS — I10 ESSENTIAL HYPERTENSION: Primary | ICD-10-CM

## 2019-10-08 DIAGNOSIS — N18.30 CKD (CHRONIC KIDNEY DISEASE) STAGE 3, GFR 30-59 ML/MIN (HCC): ICD-10-CM

## 2019-10-08 DIAGNOSIS — R79.89 LOW VITAMIN D LEVEL: ICD-10-CM

## 2019-10-08 PROCEDURE — 36415 COLL VENOUS BLD VENIPUNCTURE: CPT

## 2019-10-08 PROCEDURE — 82306 VITAMIN D 25 HYDROXY: CPT

## 2019-10-08 PROCEDURE — 80053 COMPREHEN METABOLIC PANEL: CPT

## 2019-10-08 PROCEDURE — 80061 LIPID PANEL: CPT

## 2019-10-08 PROCEDURE — 84080 ASSAY ALKALINE PHOSPHATASES: CPT

## 2019-10-08 RX ORDER — SERTRALINE HYDROCHLORIDE 25 MG/1
25 TABLET, FILM COATED ORAL DAILY
Qty: 90 TAB | Refills: 1 | Status: SHIPPED | OUTPATIENT
Start: 2019-10-08 | End: 2020-01-14

## 2019-10-08 NOTE — PROGRESS NOTES
Chief Complaint   Patient presents with    Cholesterol Problem     6 month follow up    Vitamin D Deficiency     6 month follow up    Labs     fasting    Anxiety     Taking care of her     Heartburn     x1 week

## 2019-10-08 NOTE — PATIENT INSTRUCTIONS
Office Policies Phone calls/patient messages: Please allow up to 24 hours for someone in the office to contact you about your call or message. Be mindful your provider may be out of the office or your message may require further review. We encourage you to use [employer] for your messages as this is a faster, more efficient way to communicate with our office Medication Refills: 
         
Prescription medications require 48-72 business hours to process. We encourage you to use [employer] for your refills. For controlled medications: Please allow 72 business hours to process. Certain medications may require you to  a written prescription at our office. NO narcotic/controlled medications will be prescribed after 4pm Monday through Friday or on weekends Form/Paperwork Completion: 
         
Please note a $25 fee may incur for all paperwork for completed by our providers. We ask that you allow 7-10 business days. Pre-payment is due prior to picking up/faxing the completed form. You may also download your forms to [employer] to have your doctor print off.

## 2019-10-09 ENCOUNTER — HOSPITAL ENCOUNTER (OUTPATIENT)
Dept: CT IMAGING | Age: 74
Discharge: HOME | End: 2019-10-09
Attending: SURGERY
Payer: MEDICARE

## 2019-10-09 DIAGNOSIS — I70.1 RENAL ARTERY STENOSIS (HCC): ICD-10-CM

## 2019-10-09 LAB
25(OH)D3+25(OH)D2 SERPL-MCNC: 36.6 NG/ML (ref 30–100)
ALBUMIN SERPL-MCNC: 4.5 G/DL (ref 3.5–4.8)
ALBUMIN/GLOB SERPL: 1.6 {RATIO} (ref 1.2–2.2)
ALP SERPL-CCNC: 341 IU/L (ref 39–117)
ALT SERPL-CCNC: 12 IU/L (ref 0–32)
AST SERPL-CCNC: 19 IU/L (ref 0–40)
BILIRUB SERPL-MCNC: 0.3 MG/DL (ref 0–1.2)
BUN SERPL-MCNC: 19 MG/DL (ref 8–27)
BUN/CREAT SERPL: 15 (ref 12–28)
CALCIUM SERPL-MCNC: 9.9 MG/DL (ref 8.7–10.3)
CHLORIDE SERPL-SCNC: 99 MMOL/L (ref 96–106)
CHOLEST SERPL-MCNC: 194 MG/DL (ref 100–199)
CO2 SERPL-SCNC: 25 MMOL/L (ref 20–29)
CREAT BLD-MCNC: 1.1 MG/DL (ref 0.6–1.3)
CREAT SERPL-MCNC: 1.31 MG/DL (ref 0.57–1)
GLOBULIN SER CALC-MCNC: 2.8 G/DL (ref 1.5–4.5)
GLUCOSE SERPL-MCNC: 92 MG/DL (ref 65–99)
HDLC SERPL-MCNC: 66 MG/DL
LDLC SERPL CALC-MCNC: 109 MG/DL (ref 0–99)
POTASSIUM SERPL-SCNC: 4.2 MMOL/L (ref 3.5–5.2)
PROT SERPL-MCNC: 7.3 G/DL (ref 6–8.5)
SODIUM SERPL-SCNC: 139 MMOL/L (ref 134–144)
TRIGL SERPL-MCNC: 96 MG/DL (ref 0–149)
VLDLC SERPL CALC-MCNC: 19 MG/DL (ref 5–40)

## 2019-10-09 PROCEDURE — 74174 CTA ABD&PLVS W/CONTRAST: CPT

## 2019-10-09 PROCEDURE — 74011636320 HC RX REV CODE- 636/320: Performed by: SURGERY

## 2019-10-09 PROCEDURE — 82565 ASSAY OF CREATININE: CPT

## 2019-10-09 RX ORDER — SODIUM CHLORIDE 0.9 % (FLUSH) 0.9 %
10 SYRINGE (ML) INJECTION
Status: COMPLETED | OUTPATIENT
Start: 2019-10-09 | End: 2019-10-09

## 2019-10-09 RX ADMIN — Medication 10 ML: at 10:15

## 2019-10-09 RX ADMIN — IOPAMIDOL 100 ML: 755 INJECTION, SOLUTION INTRAVENOUS at 10:15

## 2019-10-14 LAB
ALP BONE CFR SERPL: 73 % (ref 14–68)
ALP INTEST CFR SERPL: 4 % (ref 0–18)
ALP LIVER CFR SERPL: 23 % (ref 18–85)
ALP SERPL-CCNC: 323 IU/L (ref 39–117)
SPECIMEN STATUS REPORT, ROLRST: NORMAL

## 2019-10-15 ENCOUNTER — DOCUMENTATION ONLY (OUTPATIENT)
Dept: INTERNAL MEDICINE CLINIC | Age: 74
End: 2019-10-15

## 2019-10-15 ENCOUNTER — TELEPHONE (OUTPATIENT)
Dept: INTERNAL MEDICINE CLINIC | Age: 74
End: 2019-10-15

## 2019-10-15 DIAGNOSIS — M88.9 PAGET'S BONE DISEASE: Primary | ICD-10-CM

## 2019-10-15 NOTE — PROGRESS NOTES
Discussed labs and CT--c/w pagets dz of left pelvis. Asytmptomatic. Has ckd 3.  Will refer to rheum MD for further evaluation

## 2019-10-15 NOTE — TELEPHONE ENCOUNTER
----- Message from Sreekanth Ayala sent at 10/15/2019  3:28 PM EDT -----  Regarding: /Telephone   General Message/Vendor Calls    Caller's first and last name:Pt       Reason for call:office note and lab work       Callback required yes/no and why:Yes      Best contact number(s):[phone] or [phone]      Details to clarify the request:Pt requesting a copy of most recent office notes and lab work to be fax to [address] [place] [address] at [phone].     Message copied/pasted from [facility]

## 2019-10-16 ENCOUNTER — TELEPHONE (OUTPATIENT)
Dept: INTERNAL MEDICINE CLINIC | Age: 74
End: 2019-10-16

## 2019-10-16 NOTE — TELEPHONE ENCOUNTER
Bert, [address] [place] [address]             General Message/Vendor Calls     Caller's first and last name:       Reason for call:       Callback required yes/no and why: yes       Best contact number(s): [phone]       Details to clarify the request: Patient is requesting recent office notes(2) and 2 labs along with insurance information name address and phone number of the patient sent to Dr. Joaquín York received & copied from [facility]

## 2019-12-11 RX ORDER — NIFEDIPINE 60 MG/1
TABLET, EXTENDED RELEASE ORAL
Qty: 90 TAB | Refills: 0 | Status: SHIPPED | OUTPATIENT
Start: 2019-12-11 | End: 2020-01-14 | Stop reason: SDUPTHER

## 2020-01-12 NOTE — PROGRESS NOTES
HISTORY OF PRESENT ILLNESS  Doretha Emanuel is a 75 y.o. female. HPI     F/u HTN HLD postherpetic neuralgia. leg edema ckd 3 low vit d, mild right carotid carotid artery stenosis, MDD  Was started on zoloft last OV for MDD and anxiety-did not take the med d/t possible side effects  Handling the stress better now and denies feeling depressed now  Saw Dr Kimber Shelby recently --patent b/l renal artery stents by CTA , symmetirc kidneys. pagets of left hemipelvis  Due pneumonia shots-pcv 13-declines today    Had flu symptoms a few weeks ago but now has intermittent cough with phlegm. Last OV  Last --was instructed to take crestor every day instead of every other day  Dx osteopenia with low frax by dexa  Started on vit d 1000 iu every day since last OV--vit d 25      is blind, ESRD on HD, difficulty walking--she is PCG-feels depressed sometimes. She gets agitated and frustrated too .     Saw vascular Dr Finley Postin hx KALPESH.  Will get CTA tomorrow to evaluate the kidney to check the stents  Saw Dr Willian Cho in the past for ckd 3 and HTN     Had flu shot and shingrix last month    Patient Active Problem List    Diagnosis Date Noted    Essential hypertension, malignant 04/26/2015     Priority: 1 - One     Class: Present on Admission    Early satiety 08/13/2019    CKD (chronic kidney disease) stage 3, GFR 30-59 ml/min ([facility] [address] 08/13/2019    Osteopenia of hip 04/22/2019    Vitamin D deficiency 04/12/2019    Syncope and collapse 09/30/2018    Syncope due to orthostatic hypotension 09/30/2018    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    Carotid bruit present 12/05/2017    Bilateral carotid artery stenosis 12/05/2017    History of tubal ligation 09/14/2015    Bilateral renal artery stenosis (HCC)--s/p PTA/stenting 7/10/15 [facility] 08/09/2015    Stress at home 06/13/2015    H/O gastroesophageal reflux (GERD) 03/12/2015    Obesity 09/04/2013    Edema of both legs--chronic venous insufficiency,amlodipine 09/04/2013    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 09/04/2013    High cholesterol     Left ventricular hypertrophy due to hypertensive disease      Current Outpatient Medications   Medication Sig Dispense Refill    NIFEdipine ER (ADALAT CC) 60 mg ER tablet TAKE 1 TABLET BY MOUTH EVERY DAY 90 Tab 0    ubidecarenone/vitamin E mixed (COQ10  PO) Take  by mouth.  sertraline (ZOLOFT) 25 mg tablet Take 1 Tab by mouth daily. 90 Tab 1    potassium chloride SR (KLOR-CON 10) 10 mEq tablet TAKE 1 TABLET BY MOUTH TWICE DAILY (Patient taking differently: 10 mEq daily.) 60 Tab 6    NIFEdipine ER (ADALAT CC) 60 mg ER tablet Take 60 mg by mouth daily.  cholecalciferol (VITAMIN D3) 1,000 unit tablet Take  by mouth daily.  QVAR 80 mcg/actuation aero INHALE 1 PUFF BY MOUTH ONCE DAILY AS NEEDED 1 Inhaler 0    rosuvastatin (CRESTOR) 5 mg tablet Take 1 Tab by mouth daily. Indications: excessive fat in the blood 90 Tab 3    furosemide (LASIX) 20 mg tablet Take 1 Tab by mouth daily. 90 Tab 3    acetaminophen (TYLENOL) 500 mg tablet Take 500 mg by mouth every six (6) hours as needed for Pain.  aspirin delayed-release 81 mg tablet Take 81 mg by mouth daily. Indications: PT states stopping 7/22-7/26 for Eye Surgery      ranitidine (ZANTAC) 150 mg tablet Take 150 mg by mouth two (2) times daily as needed.        Allergies   Allergen Reactions    Amlodipine Other (comments)     Itching,  Muscle cramps    Clonidine Swelling    Ibuprofen Swelling    Levaquin [Levofloxacin] Unknown (comments)    Lisinopril Angioedema    Other Medication Rash and Itching     Patient reports she is allergic to the PPD test for TB    Pcn [Penicillins] Swelling    Pravastatin Myalgia    Sulfa (Sulfonamide Antibiotics) Hives      Lab Results   Component Value Date/Time    WBC 6.5 04/11/2019 12:06 PM    HGB 12.7 04/11/2019 12:06 PM    HCT 37.6 04/11/2019 12:06 PM    PLATELET 872 59/32/5500 12:06 PM    MCV 89 04/11/2019 12:06 PM     Lab Results   Component Value Date/Time    Glucose 92 10/08/2019 10:23 AM    Glucose (POC) 96 04/27/2015 09:57 PM    Microalb/Creat ratio (ug/mg creat.) 62.5 (H) 04/02/2015 09:06 AM    LDL, calculated 109 (H) 10/08/2019 10:23 AM    Creatinine (POC) 1.1 10/09/2019 09:56 AM    Creatinine 1.31 (H) 10/08/2019 10:23 AM      Lab Results   Component Value Date/Time    GFR est non-AA 40 (L) 10/08/2019 10:23 AM    GFRNA, POC 48 (L) 10/09/2019 09:56 AM    GFR est AA 46 (L) 10/08/2019 10:23 AM    GFRAA, POC 59 (L) 10/09/2019 09:56 AM    Creatinine 1.31 (H) 10/08/2019 10:23 AM    Creatinine (POC) 1.1 10/09/2019 09:56 AM    BUN 19 10/08/2019 10:23 AM    Sodium 139 10/08/2019 10:23 AM    Potassium 4.2 10/08/2019 10:23 AM    Chloride 99 10/08/2019 10:23 AM    CO2 25 10/08/2019 10:23 AM    Magnesium 1.7 02/07/2010 03:30 AM        ROS    Physical Exam  Vitals signs and nursing note reviewed. Constitutional:       Appearance: She is well-developed. Comments: Appears stated age   Cardiovascular:      Rate and Rhythm: Normal rate and regular rhythm. Heart sounds: Normal heart sounds. No murmur. No friction rub. No gallop. Pulmonary:      Effort: Pulmonary effort is normal. No respiratory distress. Breath sounds: Normal breath sounds. No wheezing. Abdominal:      General: Bowel sounds are normal.      Palpations: Abdomen is soft. Neurological:      Mental Status: She is alert. ASSESSMENT and PLAN  Diagnoses and all orders for this visit:    1. CKD (chronic kidney disease) stage 3, GFR 30-59 ml/min ([facility])  -     METABOLIC PANEL, COMPREHENSIVE  -     REFERRAL TO NEPHROLOGY   S/p b/l renal artery stents--patent  2. Essential hypertension  -     METABOLIC PANEL, COMPREHENSIVE   Reasonable control on CCB, refilled  3. Pure hypercholesterolemia  -     METABOLIC PANEL, COMPREHENSIVE  -     LIPID PANEL   On crestor  4.  Major depressive disorder, remission status unspecified, unspecified whether recurrent  -     METABOLIC PANEL, COMPREHENSIVE   Controlled without medication  5. Breast screening  -     [facility] MAMMO BI SCREENING INCL CAD; Future    6. Paget's bone disease   asymptomatic  Other orders  -     NIFEdipine ER (ADALAT CC) 60 mg ER tablet; TAKE 1 TABLET BY MOUTH EVERY DAY      Follow-up and Dispositions    · Return in about 6 months (around 7/14/2020) for medicare wellens, carotid dopplers.

## 2020-01-13 NOTE — PROGRESS NOTES
[facility] @ [address] [place], [place]  Subjective/HPI:     Stpehanie Faye is a 75 y.o. female is here for routine f/u. The patient denies chest pain/ shortness of breath, orthopnea, PND, LE edema, palpitations, syncope, presyncope or fatigue. Patient reports feeling well in her usual state of health. Home blood pressure readings similar to today. Seen by Dr. Melonie Ashby yesterday. Reviewed lab work showing normal renal function and LDL less than 100. Hx of HTN, renal artery stenosis and high cholesterol. Carotid Duplex 2019  Interpretation Summary     · There is mild stenosis in the right ICA (<50%). · There is intimal thickening present in the left ICA without significant stenosis. PCP Provider  Saeid Rogers MD  Past Medical History:   Diagnosis Date    Arthritis     hands    Asthma     Essential hypertension     GERD (gastroesophageal reflux disease)     High cholesterol     HTN (hypertension)     Ill-defined condition     hyperlipidemia    Left ventricular hypertrophy due to hypertensive disease     Renal artery stenosis ([address]     Shingles 08/11/2018      Past Surgical History:   Procedure Laterality Date    HX BREAST BIOPSY Left 20 years ago    negative    HX CATARACT REMOVAL  07/2018    right eye    HX GI      open inquinal hernia repair    HX GI      6/29/18:  pt reports mulitple abdominal surgeries but unable to recall exact types    HX GYN      tubal ligation    HX HEART CATHETERIZATION  2000    normal    HX UROLOGICAL      Bilateral Renal stents.      Allergies   Allergen Reactions    Amlodipine Other (comments)     Itching,  Muscle cramps    Clonidine Swelling    Ibuprofen Swelling    Levaquin [Levofloxacin] Unknown (comments)    Lisinopril Angioedema    Other Medication Rash and Itching     Patient reports she is allergic to the PPD test for TB    Pcn [Penicillins] Swelling    Pravastatin Myalgia    Sulfa (Sulfonamide Antibiotics) Hives      Family History   Problem Relation Age of Onset    Cancer Mother     Heart Disease Mother     Heart Disease Father     Hypertension Father     Lung Disease Father     Hypertension Brother     Diabetes Brother     Heart Disease Brother     Kidney Disease Brother     Hypertension Brother       Current Outpatient Medications   Medication Sig    NIFEdipine ER (ADALAT CC) 60 mg ER tablet TAKE 1 TABLET BY MOUTH EVERY DAY    ubidecarenone/vitamin E mixed (COQ10  PO) Take  by mouth.  sertraline (ZOLOFT) 25 mg tablet Take 1 Tab by mouth daily.  potassium chloride SR (KLOR-CON 10) 10 mEq tablet TAKE 1 TABLET BY MOUTH TWICE DAILY (Patient taking differently: 10 mEq daily.)    NIFEdipine ER (ADALAT CC) 60 mg ER tablet Take 60 mg by mouth daily.  cholecalciferol (VITAMIN D3) 1,000 unit tablet Take  by mouth daily.  QVAR 80 mcg/actuation aero INHALE 1 PUFF BY MOUTH ONCE DAILY AS NEEDED    rosuvastatin (CRESTOR) 5 mg tablet Take 1 Tab by mouth daily. Indications: excessive fat in the blood    furosemide (LASIX) 20 mg tablet Take 1 Tab by mouth daily.  acetaminophen (TYLENOL) 500 mg tablet Take 500 mg by mouth every six (6) hours as needed for Pain.  aspirin delayed-release 81 mg tablet Take 81 mg by mouth daily. Indications: PT states stopping 7/22-7/26 for Eye Surgery    ranitidine (ZANTAC) 150 mg tablet Take 150 mg by mouth two (2) times daily as needed. No current facility-administered medications for this visit. There were no vitals filed for this visit.   Social History     Socioeconomic History    Marital status:      Spouse name: Not on file    Number of children: Not on file    Years of education: Not on file    Highest education level: Not on file   Occupational History    Not on file   Social Needs    Financial resource strain: Not on file    Food insecurity:     Worry: Not on file     Inability: Not on file   [address] [place] [address] [employer] Transportation needs:     Medical: Not on file     Non-medical: Not on file   Tobacco Use    Smoking status: Never Smoker    Smokeless tobacco: Never Used   Substance and Sexual Activity    Alcohol use: No     Comment: Drinks Lite Beer    Drug use: Yes     Types: Prescription, OTC     Comment: perocet    Sexual activity: Not Currently     Partners: Male   Lifestyle    Physical activity:     Days per week: Not on file     Minutes per session: Not on file    Stress: Not on file   Relationships    Social connections:     Talks on phone: Not on file     Gets together: Not on file     Attends Bahai service: Not on file     Active member of club or organization: Not on file     Attends meetings of clubs or organizations: Not on file     Relationship status: Not on file    Intimate partner violence:     Fear of current or ex partner: Not on file     Emotionally abused: Not on file     Physically abused: Not on file     Forced sexual activity: Not on file   Other Topics Concern    Not on file   Social History Narrative    Not on file       I have reviewed the nurses notes, vitals, problem list, allergy list, medical history, family, social history and medications. Review of Symptoms:  11 systems reviewed and are negative unless stated in the HPI       Physical Exam:      General: Well developed, in no acute distress, cooperative and alert  HEENT: + Right carotid bruit, no JVD, trach is midline. Neck Supple, PERRL, EOM intact. Heart:  Normal S1/S2 negative S3 or S4. Regular, no murmur, gallop or rub. Respiratory: Clear bilaterally x 4, no wheezing or rales  Abdomen:   Soft, non-tender, no masses, bowel sounds are active. Extremities:  No edema, normal cap refill, no cyanosis, atraumatic. Neuro: A&Ox3, speech clear, gait stable. Skin: Skin color is normal. No rashes or lesions.  Non diaphoretic  Vascular: 2+ pulses symmetric in all extremities    Cardiographics    ECG: Normal sinus rhythm unchanged from previous tracing        Cardiology Labs:  Lab Results   Component Value Date/Time    Cholesterol, total 194 10/08/2019 10:23 AM    HDL Cholesterol 66 10/08/2019 10:23 AM    LDL, calculated 109 (H) 10/08/2019 10:23 AM    Triglyceride 96 10/08/2019 10:23 AM       Lab Results   Component Value Date/Time    Sodium 139 10/08/2019 10:23 AM    Potassium 4.2 10/08/2019 10:23 AM    Chloride 99 10/08/2019 10:23 AM    CO2 25 10/08/2019 10:23 AM    Anion gap 10 08/11/2018 09:21 AM    Glucose 92 10/08/2019 10:23 AM    BUN 19 10/08/2019 10:23 AM    Creatinine 1.31 (H) 10/08/2019 10:23 AM    BUN/Creatinine ratio 15 10/08/2019 10:23 AM    GFR est AA 46 (L) 10/08/2019 10:23 AM    GFR est non-AA 40 (L) 10/08/2019 10:23 AM    Calcium 9.9 10/08/2019 10:23 AM    Bilirubin, total 0.3 10/08/2019 10:23 AM    AST (SGOT) 19 10/08/2019 10:23 AM    Alk. phosphatase 341 (H) 10/08/2019 10:23 AM    Alk. phosphatase 323 (H) 10/08/2019 10:23 AM    Protein, total 7.3 10/08/2019 10:23 AM    Albumin 4.5 10/08/2019 10:23 AM    Globulin 3.7 08/11/2018 09:21 AM    A-G Ratio 1.6 10/08/2019 10:23 AM    ALT (SGPT) 12 10/08/2019 10:23 AM        No results found for: CPK, CK, CKMMB, CKMB, RCK3, CKMBT, CKMBPOC, CKNDX, CKND1, PILLO, TROPT, TROIQ, ISADORA, TROPT, TNIPOC, BNP, BNPP, BNPNT    No results found for: HBA1C, HGBE8, NDB9ESWV   Assessment:     Assessment:     Diagnoses and all orders for this visit:    1. Hypertensive left ventricular hypertrophy, without heart failure    2. Bilateral renal artery stenosis (HCC)--s/p PTA/stenting 7/10/15 [facility]    3. Essential hypertension, malignant    4. CKD (chronic kidney disease) stage 3, GFR 30-59 ml/min ([facility])    5. High cholesterol        ICD-10-CM ICD-9-CM    1. Hypertensive left ventricular hypertrophy, without heart failure I11.9 402.90    2. Bilateral renal artery stenosis ([facility])--s/p PTA/stenting 7/10/15 [facility] I70.1 440.1    3.  Essential hypertension, malignant I10 401.0    4. CKD (chronic kidney disease) stage 3, GFR 30-59 ml/min ([facility]) N18.3 585.3    5. High cholesterol E78.00 272.0      No orders of the defined types were placed in this encounter. Plan:     1. Hypertension: Controlled 122/72 continue current medication  2. History renal artery stenosis: Stented 2015 [address] 9/2019 stents patent. 3. High Cholesterol: On crestor 5mg  LDL 98 continue current therapy. 4. Right carotid stenosis: Less than 50% stenosis of the right ICA, bruit on exam today, per Dr. Pascale Lee notes plans to repeat carotid duplex later on this year. Continue statin and aspirin therapy. Follow-up with me in 6 months. Ama Poe NP      Please note that this dictation was completed with [employer], the computer voice recognition software. Quite often unanticipated grammatical, syntax, homophones, and other interpretive errors are inadvertently transcribed by the computer software. Please disregard these errors. Please excuse any errors that have escaped final proofreading. Thank you.

## 2020-01-14 ENCOUNTER — OFFICE VISIT (OUTPATIENT)
Dept: INTERNAL MEDICINE CLINIC | Age: 75
End: 2020-01-14

## 2020-01-14 ENCOUNTER — HOSPITAL ENCOUNTER (OUTPATIENT)
Dept: LAB | Age: 75
Discharge: HOME | End: 2020-01-14
Payer: MEDICARE

## 2020-01-14 VITALS
BODY MASS INDEX: 28.41 KG/M2 | HEIGHT: 67 IN | DIASTOLIC BLOOD PRESSURE: 66 MMHG | HEART RATE: 68 BPM | TEMPERATURE: 97.8 F | WEIGHT: 181 LBS | RESPIRATION RATE: 16 BRPM | OXYGEN SATURATION: 100 % | SYSTOLIC BLOOD PRESSURE: 147 MMHG

## 2020-01-14 DIAGNOSIS — M88.9 PAGET'S BONE DISEASE: ICD-10-CM

## 2020-01-14 DIAGNOSIS — Z12.39 BREAST SCREENING: ICD-10-CM

## 2020-01-14 DIAGNOSIS — N18.30 CKD (CHRONIC KIDNEY DISEASE) STAGE 3, GFR 30-59 ML/MIN (HCC): Primary | ICD-10-CM

## 2020-01-14 DIAGNOSIS — E78.00 PURE HYPERCHOLESTEROLEMIA: ICD-10-CM

## 2020-01-14 DIAGNOSIS — F32.9 MAJOR DEPRESSIVE DISORDER, REMISSION STATUS UNSPECIFIED, UNSPECIFIED WHETHER RECURRENT: ICD-10-CM

## 2020-01-14 DIAGNOSIS — I10 ESSENTIAL HYPERTENSION: ICD-10-CM

## 2020-01-14 PROCEDURE — 80053 COMPREHEN METABOLIC PANEL: CPT

## 2020-01-14 PROCEDURE — 80061 LIPID PANEL: CPT

## 2020-01-14 PROCEDURE — 36415 COLL VENOUS BLD VENIPUNCTURE: CPT

## 2020-01-14 RX ORDER — NIFEDIPINE 60 MG/1
TABLET, EXTENDED RELEASE ORAL
Qty: 90 TAB | Refills: 3 | Status: SHIPPED | OUTPATIENT
Start: 2020-01-14 | End: 2020-03-13

## 2020-01-14 NOTE — PATIENT INSTRUCTIONS
Office Policies    Phone calls/patient messages:            Please allow up to 24 hours for someone in the office to contact you about your call or message. Be mindful your provider may be out of the office or your message may require further review. We encourage you to use [employer] for your messages as this is a faster, more efficient way to communicate with our office                         Medication Refills:            Prescription medications require 48-72 business hours to process. We encourage you to use [employer] for your refills. For controlled medications: Please allow 72 business hours to process. Certain medications may require you to  a written prescription at our office. NO narcotic/controlled medications will be prescribed after 4pm Monday through Friday or on weekends              Form/Paperwork Completion:            Please note a $25 fee may incur for all paperwork for completed by our providers. We ask that you allow 7-10 business days. Pre-payment is due prior to picking up/faxing the completed form. You may also download your forms to [employer] to have your doctor print off.

## 2020-01-15 ENCOUNTER — OFFICE VISIT (OUTPATIENT)
Dept: CARDIOLOGY CLINIC | Age: 75
End: 2020-01-15

## 2020-01-15 VITALS
HEIGHT: 67 IN | WEIGHT: 180 LBS | BODY MASS INDEX: 28.25 KG/M2 | HEART RATE: 68 BPM | OXYGEN SATURATION: 100 % | SYSTOLIC BLOOD PRESSURE: 122 MMHG | DIASTOLIC BLOOD PRESSURE: 72 MMHG | RESPIRATION RATE: 16 BRPM

## 2020-01-15 DIAGNOSIS — N18.30 CKD (CHRONIC KIDNEY DISEASE) STAGE 3, GFR 30-59 ML/MIN (HCC): ICD-10-CM

## 2020-01-15 DIAGNOSIS — I11.9 HYPERTENSIVE LEFT VENTRICULAR HYPERTROPHY, WITHOUT HEART FAILURE: Primary | ICD-10-CM

## 2020-01-15 DIAGNOSIS — E78.00 HIGH CHOLESTEROL: ICD-10-CM

## 2020-01-15 DIAGNOSIS — I10 ESSENTIAL HYPERTENSION, MALIGNANT: ICD-10-CM

## 2020-01-15 DIAGNOSIS — I70.1 BILATERAL RENAL ARTERY STENOSIS (HCC): ICD-10-CM

## 2020-01-15 LAB
ALBUMIN SERPL-MCNC: 4 G/DL (ref 3.5–4.8)
ALBUMIN/GLOB SERPL: 1.4 {RATIO} (ref 1.2–2.2)
ALP SERPL-CCNC: 297 IU/L (ref 39–117)
ALT SERPL-CCNC: 16 IU/L (ref 0–32)
AST SERPL-CCNC: 21 IU/L (ref 0–40)
BILIRUB SERPL-MCNC: 0.3 MG/DL (ref 0–1.2)
BUN SERPL-MCNC: 21 MG/DL (ref 8–27)
BUN/CREAT SERPL: 21 (ref 12–28)
CALCIUM SERPL-MCNC: 9.4 MG/DL (ref 8.7–10.3)
CHLORIDE SERPL-SCNC: 102 MMOL/L (ref 96–106)
CHOLEST SERPL-MCNC: 181 MG/DL (ref 100–199)
CO2 SERPL-SCNC: 22 MMOL/L (ref 20–29)
CREAT SERPL-MCNC: 0.99 MG/DL (ref 0.57–1)
GLOBULIN SER CALC-MCNC: 2.9 G/DL (ref 1.5–4.5)
GLUCOSE SERPL-MCNC: 103 MG/DL (ref 65–99)
HDLC SERPL-MCNC: 63 MG/DL
LDLC SERPL CALC-MCNC: 98 MG/DL (ref 0–99)
POTASSIUM SERPL-SCNC: 4.5 MMOL/L (ref 3.5–5.2)
PROT SERPL-MCNC: 6.9 G/DL (ref 6–8.5)
SODIUM SERPL-SCNC: 139 MMOL/L (ref 134–144)
TRIGL SERPL-MCNC: 98 MG/DL (ref 0–149)
VLDLC SERPL CALC-MCNC: 20 MG/DL (ref 5–40)

## 2020-01-15 NOTE — PROGRESS NOTES
Chief Complaint   Patient presents with    Cholesterol Problem     6 month follow up     1. Have you been to the ER, urgent care clinic since your last visit? Hospitalized since your last visit? No    2. Have you seen or consulted any other health care providers outside of the [address] [place] [address] since your last visit? Include any pap smears or colon screening.  No

## 2020-01-15 NOTE — PROGRESS NOTES
Tell pt her kidney functiom is wnl--does not need the see the kidney specialist. Will monitor her labs here q 6 momths.    lytes liver ok  Mild glucose elevation--low calorie diet recommended  Lipids at goal on crestor

## 2020-01-15 NOTE — PROGRESS NOTES
Called, spoke to pt. Two identifiers confirmed. Notified pt of lab results/recommendations per Dr. Jair Eric. Pt verbalized understanding of information discussed w/ no further questions at this time.

## 2020-01-24 ENCOUNTER — HOSPITAL ENCOUNTER (OUTPATIENT)
Dept: MAMMOGRAPHY | Age: 75
Discharge: HOME | End: 2020-01-24
Attending: INTERNAL MEDICINE
Payer: MEDICARE

## 2020-01-24 DIAGNOSIS — Z12.31 VISIT FOR SCREENING MAMMOGRAM: ICD-10-CM

## 2020-01-24 PROCEDURE — 77063 BREAST TOMOSYNTHESIS BI: CPT

## 2020-03-13 RX ORDER — NIFEDIPINE 60 MG/1
TABLET, EXTENDED RELEASE ORAL
Qty: 90 TAB | Refills: 3 | Status: SHIPPED | OUTPATIENT
Start: 2020-03-13 | End: 2021-01-27

## 2020-04-25 RX ORDER — FUROSEMIDE 20 MG/1
TABLET ORAL
Qty: 90 TAB | Refills: 3 | Status: SHIPPED | OUTPATIENT
Start: 2020-04-25 | End: 2021-05-11 | Stop reason: SDUPTHER

## 2020-04-25 RX ORDER — ROSUVASTATIN CALCIUM 5 MG/1
TABLET, COATED ORAL
Qty: 90 TAB | Refills: 3 | Status: SHIPPED | OUTPATIENT
Start: 2020-04-25 | End: 2021-05-11 | Stop reason: SDUPTHER

## 2020-07-14 ENCOUNTER — VIRTUAL VISIT (OUTPATIENT)
Dept: INTERNAL MEDICINE CLINIC | Age: 75
End: 2020-07-14

## 2020-07-14 DIAGNOSIS — I10 ESSENTIAL HYPERTENSION: Primary | ICD-10-CM

## 2020-07-14 DIAGNOSIS — Z00.00 MEDICARE ANNUAL WELLNESS VISIT, SUBSEQUENT: ICD-10-CM

## 2020-07-14 DIAGNOSIS — N18.30 CKD (CHRONIC KIDNEY DISEASE) STAGE 3, GFR 30-59 ML/MIN (HCC): ICD-10-CM

## 2020-07-14 DIAGNOSIS — E78.00 PURE HYPERCHOLESTEROLEMIA: ICD-10-CM

## 2020-07-14 RX ORDER — GLUCOSAMINE/CHONDR SU A SOD 750-600 MG
TABLET ORAL
COMMUNITY

## 2020-07-14 NOTE — PROGRESS NOTES
HISTORY OF PRESENT ILLNESS  Bradly Toribio is a 67 y.o. female. HPI   Bradly Toribio is a 67 y.o. female being evaluated by a Virtual Visit (video visit) encounter to address concerns as mentioned above. A caregiver was present when appropriate. Due to this being a TeleHealth encounter (During [place] [id number] [facility]), evaluation of the following organ systems was limited: Vitals/Constitutional/EENT/Resp/CV/GI//MS/Neuro/Skin/Heme-Lymph-Imm. Pursuant to the emergency declaration under the [address] [facility], [address] [place] [address] authority and the [employer] Act, this Virtual Visit was conducted with patient's (and/or legal guardian's) consent, to reduce the risk of exposure to COVID-19 and provide necessary medical care. Services were provided through a video synchronous discussion virtually to substitute for in-person encounter. --Jennifer Esquivel MD on 7/13/2020 at 9:29 PM    An electronic signature was used to authenticate this note. F/u HTN HLD postherpetic neuralgia. leg edema ckd 3 low vit d, mild right carotid carotid artery stenosis, MDD and medicare wellness----    Home BP -140/78 hr 67  mdd and anxiety controlled witout medication per pt    No cp or sob  Last OV  Was started on zoloft last OV for MDD and anxiety-did not take the med d/t possible side effects  Handling the stress better now and denies feeling depressed now  Saw Dr Vernon Persons recently --patent b/l renal artery stents by CTA , symmetirc kidneys.  pagets of left hemipelvis  Due pneumonia shots-pcv 13-declines today     Had flu symptoms a few weeks ago but now has intermittent cough with phlegm.         Patient Active Problem List    Diagnosis Date Noted    Essential hypertension, malignant 04/26/2015     Priority: 1 - One     Class: Present on Admission    Early satiety 08/13/2019    CKD (chronic kidney disease) stage 3, GFR 30-59 ml/min ([facility]) 08/13/2019    Osteopenia of hip 04/22/2019    Vitamin D deficiency 04/12/2019    Syncope and collapse 09/30/2018    Syncope due to orthostatic hypotension 09/30/2018    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    Carotid bruit present 12/05/2017    Bilateral carotid artery stenosis 12/05/2017    History of tubal ligation 09/14/2015    Bilateral renal artery stenosis (HCC)--s/p PTA/stenting 7/10/15 [facility] 08/09/2015    Stress at home 06/13/2015    H/O gastroesophageal reflux (GERD) 03/12/2015    Obesity 09/04/2013    Edema of both legs--chronic venous insufficiency,amlodipine 09/04/2013    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 09/04/2013    High cholesterol     Left ventricular hypertrophy due to hypertensive disease      Current Outpatient Medications   Medication Sig Dispense Refill    Biotin 2,500 mcg cap Take  by mouth.  furosemide (LASIX) 20 mg tablet TAKE 1 TABLET BY MOUTH DAILY 90 Tab 3    rosuvastatin (CRESTOR) 5 mg tablet TAKE 1 TABLET BY MOUTH DAILY 90 Tab 3    NIFEdipine ER (ADALAT CC) 60 mg ER tablet TAKE 1 TABLET BY MOUTH EVERY DAY 90 Tab 3    beclomethasone (QVAR) 80 mcg/actuation aero INHALE 1 PUFF BY MOUTH ONCE DAILY AS NEEDED 1 Inhaler 0    ubidecarenone/vitamin E mixed (COQ10  PO) Take  by mouth.  potassium chloride SR (KLOR-CON 10) 10 mEq tablet TAKE 1 TABLET BY MOUTH TWICE DAILY (Patient taking differently: 10 mEq daily.) 60 Tab 6    cholecalciferol (VITAMIN D3) 1,000 unit tablet Take  by mouth daily.  acetaminophen (TYLENOL) 500 mg tablet Take 500 mg by mouth every six (6) hours as needed for Pain.  aspirin delayed-release 81 mg tablet Take 81 mg by mouth daily.  Indications: PT states stopping 7/22-7/26 for Eye Surgery       Allergies   Allergen Reactions    Amlodipine Other (comments)     Itching,  Muscle cramps    Clonidine Swelling    Ibuprofen Swelling    Levaquin [Levofloxacin] Unknown (comments)    Lisinopril Angioedema    Other Medication Rash and Itching     Patient reports she is allergic to the PPD test for TB    Pcn [Penicillins] Swelling    Pravastatin Myalgia    Sulfa (Sulfonamide Antibiotics) Hives      Lab Results   Component Value Date/Time    WBC 6.5 04/11/2019 12:06 PM    HGB 12.7 04/11/2019 12:06 PM    HCT 37.6 04/11/2019 12:06 PM    PLATELET 294 59/89/0115 12:06 PM    MCV 89 04/11/2019 12:06 PM     Lab Results   Component Value Date/Time    Glucose 103 (H) 01/14/2020 10:16 AM    Glucose (POC) 96 04/27/2015 09:57 PM    Microalb/Creat ratio (ug/mg creat.) 62.5 (H) 04/02/2015 09:06 AM    LDL, calculated 98 01/14/2020 10:16 AM    Creatinine (POC) 1.1 10/09/2019 09:56 AM    Creatinine 0.99 01/14/2020 10:16 AM      Lab Results   Component Value Date/Time    Cholesterol, total 181 01/14/2020 10:16 AM    HDL Cholesterol 63 01/14/2020 10:16 AM    LDL, calculated 98 01/14/2020 10:16 AM    Triglyceride 98 01/14/2020 10:16 AM     Lab Results   Component Value Date/Time    GFR est non-AA 56 (L) 01/14/2020 10:16 AM    GFRNA, POC 48 (L) 10/09/2019 09:56 AM    GFR est AA 64 01/14/2020 10:16 AM    GFRAA, POC 59 (L) 10/09/2019 09:56 AM    Creatinine 0.99 01/14/2020 10:16 AM    Creatinine (POC) 1.1 10/09/2019 09:56 AM    BUN 21 01/14/2020 10:16 AM    Sodium 139 01/14/2020 10:16 AM    Potassium 4.5 01/14/2020 10:16 AM    Chloride 102 01/14/2020 10:16 AM    CO2 22 01/14/2020 10:16 AM    Magnesium 1.7 02/07/2010 03:30 AM        ROS    Physical Exam  Constitutional:       Appearance: Normal appearance. Neurological:      Mental Status: She is alert. ASSESSMENT and PLAN  Diagnoses and all orders for this visit:    1. Essential hypertension  -     CBC W/O DIFF  -     METABOLIC PANEL, COMPREHENSIVE    2. Pure hypercholesterolemia  -     METABOLIC PANEL, COMPREHENSIVE  -     LIPID PANEL    3. CKD (chronic kidney disease) stage 3, GFR 30-59 ml/min ([facility])  -     METABOLIC PANEL, COMPREHENSIVE    4.  Carotid stenosis   Repeat dopplers in 1 year       This is the Subsequent Medicare Annual Wellness Exam, performed 12 months or more after the Initial AWV or the last Subsequent AWV    I have reviewed the patient's medical history in detail and updated the computerized patient record. History     Patient Active Problem List   Diagnosis Code    High cholesterol E78.00    Left ventricular hypertrophy due to hypertensive disease I11.9    Obesity E66.9    Edema of both legs--chronic venous insufficiency,amlodipine R60.0    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 R06.83    H/O gastroesophageal reflux (GERD) Z87.19    Essential hypertension, malignant I10    Stress at home F43.9    Bilateral renal artery stenosis (HCC)--s/p PTA/stenting 7/10/15 [facility] I70.1    History of tubal ligation Z98.51    Carotid bruit present R09.89    Bilateral carotid artery stenosis I65.23    Paget's bone disease M88.9    Syncope and collapse R55    Syncope due to orthostatic hypotension I95.1    Vitamin D deficiency E55.9    Osteopenia of hip M85.859    Early satiety R68.81    CKD (chronic kidney disease) stage 3, GFR 30-59 ml/min ([facility]) N18.3     Past Medical History:   Diagnosis Date    Arthritis     hands    Asthma     Essential hypertension     GERD (gastroesophageal reflux disease)     High cholesterol     HTN (hypertension)     Ill-defined condition     hyperlipidemia    Left ventricular hypertrophy due to hypertensive disease     [facility]al artery stenosis ([facility]     Shingles 08/11/2018      Past Surgical History:   Procedure Laterality Date    HX BREAST BIOPSY Left 20 years ago    negative    HX CATARACT REMOVAL  07/2018    right eye    HX GI      open inquinal hernia repair    HX GI      6/29/18:  pt reports mulitple abdominal surgeries but unable to recall exact types    HX GYN      tubal ligation    HX HEART CATHETERIZATION  2000    normal    HX UROLOGICAL      Bilateral Renal stents.      Current Outpatient Medications   Medication Sig Dispense Refill    Biotin 2,500 mcg cap Take  by mouth.  furosemide (LASIX) 20 mg tablet TAKE 1 TABLET BY MOUTH DAILY 90 Tab 3    rosuvastatin (CRESTOR) 5 mg tablet TAKE 1 TABLET BY MOUTH DAILY 90 Tab 3    NIFEdipine ER (ADALAT CC) 60 mg ER tablet TAKE 1 TABLET BY MOUTH EVERY DAY 90 Tab 3    beclomethasone (QVAR) 80 mcg/actuation aero INHALE 1 PUFF BY MOUTH ONCE DAILY AS NEEDED 1 Inhaler 0    ubidecarenone/vitamin E mixed (COQ10  PO) Take  by mouth.  potassium chloride SR (KLOR-CON 10) 10 mEq tablet TAKE 1 TABLET BY MOUTH TWICE DAILY (Patient taking differently: 10 mEq daily.) 60 Tab 6    cholecalciferol (VITAMIN D3) 1,000 unit tablet Take  by mouth daily.  acetaminophen (TYLENOL) 500 mg tablet Take 500 mg by mouth every six (6) hours as needed for Pain.  aspirin delayed-release 81 mg tablet Take 81 mg by mouth daily.  Indications: PT states stopping 7/22-7/26 for Eye Surgery       Allergies   Allergen Reactions    Amlodipine Other (comments)     Itching,  Muscle cramps    Clonidine Swelling    Ibuprofen Swelling    Levaquin [Levofloxacin] Unknown (comments)    Lisinopril Angioedema    Other Medication Rash and Itching     Patient reports she is allergic to the PPD test for TB    Pcn [Penicillins] Swelling    Pravastatin Myalgia    Sulfa (Sulfonamide Antibiotics) Hives       Family History   Problem Relation Age of Onset    Cancer Mother     Heart Disease Mother     Heart Disease Father     Hypertension Father     Lung Disease Father     Hypertension Brother     Diabetes Brother     Heart Disease Brother     Kidney Disease Brother     Hypertension Brother      Social History     Tobacco Use    Smoking status: Never Smoker    Smokeless tobacco: Never Used   Substance Use Topics    Alcohol use: No     Comment: Drinks Lite Beer       Depression Risk Factor Screening:     3 most recent PHQ Screens 7/14/2020   Little interest or pleasure in doing things Not at all   Feeling down, depressed, irritable, or hopeless Not at all   Total Score PHQ 2 0       Alcohol Risk Factor Screening:   Do you average 1 drink per night or more than 7 drinks a week:  No    On any one occasion in the past three months have you have had more than 3 drinks containing alcohol:  No      Functional Ability and Level of Safety:   Hearing: Hearing is good. Activities of Daily Living: The home contains: handrails  Patient does total self care     Ambulation: with no difficulty     Fall Risk:  Fall Risk Assessment, last 12 mths 7/14/2020   Able to walk? Yes   Fall in past 12 months? No   Fall with injury? -   Number of falls in past 12 months -   Fall Risk Score -     Abuse Screen:  Patient is not abused       Cognitive Screening   Has your family/caregiver stated any concerns about your memory: no    Cognitive Screening: Normal - serial 3    Patient Care Team   Patient Care Team:  Tea Prieto MD as PCP - General (Internal Medicine)  Tea Prieto MD as PCP - [facility]  KALIA Pepper as Nurse Practitioner (Nurse Practitioner)  Sylvia Mayfield MD (Ophthalmology)  Jono Medrano NP as Physician (Nurse Practitioner)    Assessment/Plan   Education and counseling provided:  Are appropriate based on today's review and evaluation  End-of-Life planning (with patient's consent)  Screening for glaucoma    Diagnoses and all orders for this visit:    1. Essential hypertension  -     CBC W/O DIFF  -     METABOLIC PANEL, COMPREHENSIVE   controlled  2. Pure hypercholesterolemia  -     METABOLIC PANEL, COMPREHENSIVE  -     LIPID PANEL   Continue statin  3. CKD (chronic kidney disease) stage 3, GFR 30-59 ml/min ([facility])  -     METABOLIC PANEL, COMPREHENSIVE    4.  Carotid stenosis   Mild, repeat dopplers 1 month    Health Maintenance Due   Topic Date Due    GLAUCOMA SCREENING Q2Y  01/31/2009    Medicare Yearly Exam  04/11/2020       Corin Krishnan, who was evaluated through a synchronous (real-time) audio-video encounter, and/or her healthcare decision maker, is aware that it is a billable service, with coverage as determined by her insurance carrier. She provided verbal consent to proceed: Yes, and patient identification was verified. It was conducted pursuant to the emergency declaration under the [address] [place] [address], [address] [place] [address] authority and the [employer] Act. A caregiver was present when appropriate. Ability to conduct physical exam was limited. I was at home. The patient was at home.     Chandra Rivera MD

## 2020-07-14 NOTE — PATIENT INSTRUCTIONS
This is an established visit conducted via telemedicine. The patient has been instructed that this meets HIPAA criteria and acknowledges and agrees to this method of visitation. Clifton Deal [place]  [birth year]  2:14 AM  Chief Complaint   Patient presents with    Cholesterol Problem     6 month follow up    Hypertension     6 month follow up   [facility]     mail orders with maps       Medicare Wellness Visit, Female     The best way to live healthy is to have a lifestyle where you eat a well-balanced diet, exercise regularly, limit alcohol use, and quit all forms of tobacco/nicotine, if applicable. Regular preventive services are another way to keep healthy. Preventive services (vaccines, screening tests, monitoring & exams) can help personalize your care plan, which helps you manage your own care. Screening tests can find health problems at the earliest stages, when they are easiest to treat. [facility] follows the current, evidence-based guidelines published by the [facility] ([facility]) when recommending preventive services for our patients. Because we follow these guidelines, sometimes recommendations change over time as research supports it. (For example, mammograms used to be recommended annually. Even though Medicare will still pay for an annual mammogram, the newer guidelines recommend a mammogram every two years for women of average risk). Of course, you and your doctor may decide to screen more often for some diseases, based on your risk and your co-morbidities (chronic disease you are already diagnosed with). Preventive services for you include:  - Medicare offers their members a free annual wellness visit, which is time for you and your primary care provider to discuss and plan for your preventive service needs. Take advantage of this benefit every year!  -All adults over the age of 72 should receive the recommended pneumonia vaccines. Current USPSTF guidelines recommend a series of two vaccines for the best pneumonia protection.   -All adults should have a flu vaccine yearly and a tetanus vaccine every 10 years.   -All adults age 48 and older should receive the shingles vaccines (series of two vaccines). -All adults age 38-68 who are overweight should have a diabetes screening test once every three years.   -All adults born between [birth year] and 1966 should be screened once for Hepatitis C.  -Other screening tests and preventive services for persons with diabetes include: an eye exam to screen for diabetic retinopathy, a kidney function test, a foot exam, and stricter control over your cholesterol.   -Cardiovascular screening for adults with routine risk involves an electrocardiogram (ECG) at intervals determined by your doctor.   -Colorectal cancer screenings should be done for adults age 54-65 with no increased risk factors for colorectal cancer. There are a number of acceptable methods of screening for this type of cancer. Each test has its own benefits and drawbacks. Discuss with your doctor what is most appropriate for you during your annual wellness visit. The different tests include: colonoscopy (considered the best screening method), a fecal occult blood test, a fecal DNA test, and sigmoidoscopy.    -A bone mass density test is recommended when a woman turns 65 to screen for osteoporosis. This test is only recommended one time, as a screening. Some providers will use this same test as a disease monitoring tool if you already have osteoporosis. -Breast cancer screenings are recommended every other year for women of normal risk, age 54-69.  -Cervical cancer screenings for women over age 72 are only recommended with certain risk factors.      Here is a list of your current Health Maintenance items (your personalized list of preventive services) with a due date:  Health Maintenance Due   Topic Date Due    Glaucoma Screening   01/31/2009    Annual Well Visit  04/11/2020

## 2020-07-28 ENCOUNTER — HOSPITAL ENCOUNTER (OUTPATIENT)
Dept: LAB | Age: 75
Discharge: HOME | End: 2020-07-28
Payer: MEDICARE

## 2020-07-28 PROCEDURE — 85027 COMPLETE CBC AUTOMATED: CPT

## 2020-07-28 PROCEDURE — 80053 COMPREHEN METABOLIC PANEL: CPT

## 2020-07-28 PROCEDURE — 80061 LIPID PANEL: CPT

## 2020-07-28 PROCEDURE — 36415 COLL VENOUS BLD VENIPUNCTURE: CPT

## 2020-07-29 LAB
ALBUMIN SERPL-MCNC: 4.5 G/DL (ref 3.7–4.7)
ALBUMIN/GLOB SERPL: 1.7 {RATIO} (ref 1.2–2.2)
ALP SERPL-CCNC: 307 IU/L (ref 39–117)
ALT SERPL-CCNC: 15 IU/L (ref 0–32)
AST SERPL-CCNC: 23 IU/L (ref 0–40)
BILIRUB SERPL-MCNC: 0.5 MG/DL (ref 0–1.2)
BUN SERPL-MCNC: 22 MG/DL (ref 8–27)
BUN/CREAT SERPL: 14 (ref 12–28)
CALCIUM SERPL-MCNC: 9.9 MG/DL (ref 8.7–10.3)
CHLORIDE SERPL-SCNC: 103 MMOL/L (ref 96–106)
CHOLEST SERPL-MCNC: 218 MG/DL (ref 100–199)
CO2 SERPL-SCNC: 22 MMOL/L (ref 20–29)
CREAT SERPL-MCNC: 1.52 MG/DL (ref 0.57–1)
ERYTHROCYTE [DISTWIDTH] IN BLOOD BY AUTOMATED COUNT: 12.3 % (ref 11.7–15.4)
GLOBULIN SER CALC-MCNC: 2.7 G/DL (ref 1.5–4.5)
GLUCOSE SERPL-MCNC: 93 MG/DL (ref 65–99)
HCT VFR BLD AUTO: 37.9 % (ref 34–46.6)
HDLC SERPL-MCNC: 76 MG/DL
HGB BLD-MCNC: 12.8 G/DL (ref 11.1–15.9)
LDLC SERPL CALC-MCNC: 122 MG/DL (ref 0–99)
MCH RBC QN AUTO: 30.3 PG (ref 26.6–33)
MCHC RBC AUTO-ENTMCNC: 33.8 G/DL (ref 31.5–35.7)
MCV RBC AUTO: 90 FL (ref 79–97)
PLATELET # BLD AUTO: 270 X10E3/UL (ref 150–450)
POTASSIUM SERPL-SCNC: 4.6 MMOL/L (ref 3.5–5.2)
PROT SERPL-MCNC: 7.2 G/DL (ref 6–8.5)
RBC # BLD AUTO: 4.23 X10E6/UL (ref 3.77–5.28)
SODIUM SERPL-SCNC: 142 MMOL/L (ref 134–144)
TRIGL SERPL-MCNC: 98 MG/DL (ref 0–149)
VLDLC SERPL CALC-MCNC: 20 MG/DL (ref 5–40)
WBC # BLD AUTO: 4.8 X10E3/UL (ref 3.4–10.8)

## 2020-07-29 NOTE — PROGRESS NOTES
Tell pt normal cbc and lytes  Her kidney function if impaired again--needs to hydrate more and avoid nsaids--we cancelled the referral las OV but I think she shold go ahead and see the renal MD Dr [phone]    Lipids are ok on crestor 5 mg qd

## 2020-07-30 NOTE — PROGRESS NOTES
Called, spoke to pt. Two identifiers confirmed. Notified pt of lab results/recommendations per Dr. Esther Templeton. Pt verbalized understanding of information discussed w/ no further questions at this time. Records faxed to Dr. Homero Collet office.

## 2020-08-03 RX ORDER — POTASSIUM CHLORIDE 750 MG/1
TABLET, FILM COATED, EXTENDED RELEASE ORAL
Qty: 60 TAB | Refills: 6 | Status: SHIPPED | OUTPATIENT
Start: 2020-08-03

## 2020-08-04 NOTE — PROGRESS NOTES
[facility] @ [address], [place]  Subjective/HPI:     Luis Angel Ivey is a 67 y.o. female is here for routine f/u. The patient denies resting shortness of breath, orthopnea, PND, LE edema, palpitations, syncope, presyncope or fatigue. Ms. Charity Bragg reports today she has been having a intermittent tightness sensation underneath her left breast, at times a numbness tingling sensation in her left shoulder with radiation down to the left arm. She admits that these symptoms do wax and wane since her initial diagnosis of shingles that affected around the left side thoracic dermatome back in 2018. She does mention having dyspnea on exertion climbing a flight of stairs, overall increased fatigue however has some significant stressors at home caring for her invalid . She is compliant on all medications. She has a history of a right carotid bruit with carotid stenosis less than 50% seen on previous ultrasound, she denies visual disturbance headache or auditory changes. Plan from previous visit:  1. Hypertension: Controlled 122/72 continue current medication  2. History renal artery stenosis: Stented 2015 [address] 9/2019 stents patent. 3. High Cholesterol: On crestor 5mg  LDL 98 continue current therapy. 4. Right carotid stenosis: Less than 50% stenosis of the right ICA, bruit on exam today, per Dr. Grigsby Nurse notes plans to repeat carotid duplex later on this year. Continue statin and aspirin therapy. Follow-up with me in 6 months    Carotid Duplex 2019  Interpretation Summary      · There is mild stenosis in the right ICA (<50%). · There is intimal thickening present in the left ICA without significant stenosis.        PCP Provider  Dina Carlson MD  Past Medical History:   Diagnosis Date    Arthritis     hands    Asthma     Essential hypertension     GERD (gastroesophageal reflux disease)     High cholesterol     HTN (hypertension)     Ill-defined condition     hyperlipidemia    Left ventricular hypertrophy due to hypertensive disease     Renal artery stenosis ([address]     Shingles 08/11/2018      Past Surgical History:   Procedure Laterality Date    HX BREAST BIOPSY Left 20 years ago    negative    HX CATARACT REMOVAL  07/2018    right eye    HX GI      open inquinal hernia repair    HX GI      6/29/18:  pt reports mulitple abdominal surgeries but unable to recall exact types    HX GYN      tubal ligation    HX HEART CATHETERIZATION  2000    normal    HX UROLOGICAL      Bilateral Renal stents. Allergies   Allergen Reactions    Amlodipine Other (comments)     Itching,  Muscle cramps    Clonidine Swelling    Ibuprofen Swelling    Levaquin [Levofloxacin] Unknown (comments)    Lisinopril Angioedema    Other Medication Rash and Itching     Patient reports she is allergic to the PPD test for TB    Pcn [Penicillins] Swelling    Pravastatin Myalgia    Sulfa (Sulfonamide Antibiotics) Hives      Family History   Problem Relation Age of Onset    Cancer Mother     Heart Disease Mother     Heart Disease Father     Hypertension Father     Lung Disease Father     Hypertension Brother     Diabetes Brother     Heart Disease Brother     Kidney Disease Brother     Hypertension Brother       Current Outpatient Medications   Medication Sig    potassium chloride SR (KLOR-CON 10) 10 mEq tablet TAKE 1 TABLET BY MOUTH TWICE DAILY    Biotin 2,500 mcg cap Take  by mouth.  furosemide (LASIX) 20 mg tablet TAKE 1 TABLET BY MOUTH DAILY    rosuvastatin (CRESTOR) 5 mg tablet TAKE 1 TABLET BY MOUTH DAILY    NIFEdipine ER (ADALAT CC) 60 mg ER tablet TAKE 1 TABLET BY MOUTH EVERY DAY    beclomethasone (QVAR) 80 mcg/actuation aero INHALE 1 PUFF BY MOUTH ONCE DAILY AS NEEDED    ubidecarenone/vitamin E mixed (COQ10  PO) Take  by mouth.  cholecalciferol (VITAMIN D3) 1,000 unit tablet Take  by mouth daily.     acetaminophen (TYLENOL) 500 mg tablet Take 500 mg by mouth every six (6) hours as needed for Pain.  aspirin delayed-release 81 mg tablet Take 81 mg by mouth daily. Indications: PT states stopping 7/22-7/26 for Eye Surgery     No current facility-administered medications for this visit. There were no vitals filed for this visit. Social History     Socioeconomic History    Marital status:      Spouse name: Not on file    Number of children: Not on file    Years of education: Not on file    Highest education level: Not on file   Occupational History    Not on file   Social Needs    Financial resource strain: Not on file    Food insecurity     Worry: Not on file     Inability: Not on file    Transportation needs     Medical: Not on file     Non-medical: Not on file   Tobacco Use    Smoking status: Never Smoker    Smokeless tobacco: Never Used   Substance and Sexual Activity    Alcohol use: No     Comment: Drinks Lite Beer    Drug use: Yes     Types: Prescription, OTC     Comment: perocet    Sexual activity: Not Currently     Partners: Male   Lifestyle    Physical activity     Days per week: Not on file     Minutes per session: Not on file    Stress: Not on file   Relationships    Social connections     Talks on phone: Not on file     Gets together: Not on file     Attends Anabaptist service: Not on file     Active member of club or organization: Not on file     Attends meetings of clubs or organizations: Not on file     Relationship status: Not on file    Intimate partner violence     Fear of current or ex partner: Not on file     Emotionally abused: Not on file     Physically abused: Not on file     Forced sexual activity: Not on file   Other Topics Concern    Not on file   Social History Narrative    Not on file       I have reviewed the nurses notes, vitals, problem list, allergy list, medical history, family, social history and medications.     Review of Symptoms  11 systems reviewed, negative other than as stated in the HPI      Physical Exam:      General: Well developed, in no acute distress, cooperative and alert  HEENT: + rt carotid bruit, no JVD, trach is midline. Neck Supple, PERRL, EOM intact. Heart:  Normal S1/S2 negative S3 or S4. Regular, no murmur, gallop or rub. Respiratory: Clear bilaterally x 4, no wheezing or rales  Abdomen:   Soft, non-tender, no masses, bowel sounds are active. Extremities:  No edema, normal cap refill, no cyanosis, atraumatic. Neuro: A&Ox3, speech clear, gait stable. Skin: Skin color is normal. No rashes or lesions. Non diaphoretic  Vascular: 2+ pulses symmetric in all extremities    Cardiographics    ECG: NSR w/ PAC         Cardiology Labs:  Lab Results   Component Value Date/Time    Cholesterol, total 218 (H) 07/28/2020 09:10 AM    HDL Cholesterol 76 07/28/2020 09:10 AM    LDL, calculated 122 (H) 07/28/2020 09:10 AM    Triglyceride 98 07/28/2020 09:10 AM       Lab Results   Component Value Date/Time    Sodium 142 07/28/2020 09:10 AM    Potassium 4.6 07/28/2020 09:10 AM    Chloride 103 07/28/2020 09:10 AM    CO2 22 07/28/2020 09:10 AM    Anion gap 10 08/11/2018 09:21 AM    Glucose 93 07/28/2020 09:10 AM    BUN 22 07/28/2020 09:10 AM    Creatinine 1.52 (H) 07/28/2020 09:10 AM    BUN/Creatinine ratio 14 07/28/2020 09:10 AM    GFR est AA 38 (L) 07/28/2020 09:10 AM    GFR est non-AA 33 (L) 07/28/2020 09:10 AM    Calcium 9.9 07/28/2020 09:10 AM    Bilirubin, total 0.5 07/28/2020 09:10 AM    Alk. phosphatase 307 (H) 07/28/2020 09:10 AM    Protein, total 7.2 07/28/2020 09:10 AM    Albumin 4.5 07/28/2020 09:10 AM    Globulin 3.7 08/11/2018 09:21 AM    A-G Ratio 1.7 07/28/2020 09:10 AM    ALT (SGPT) 15 07/28/2020 09:10 AM           Assessment:     Assessment:     Diagnoses and all orders for this visit:    1. Hypertensive left ventricular hypertrophy, without heart failure    2. Bilateral renal artery stenosis (HCC)--s/p PTA/stenting 7/10/15 [facility]    3.  Essential hypertension, malignant    4. High cholesterol    5. CKD (chronic kidney disease) stage 3, GFR 30-59 ml/min ([facility])        ICD-10-CM ICD-9-CM    1. Hypertensive left ventricular hypertrophy, without heart failure  I11.9 402.90    2. Bilateral renal artery stenosis (HCC)--s/p PTA/stenting 7/10/15 [facility]  I70.1 440.1    3. Essential hypertension, malignant  I10 401.0    4. High cholesterol  E78.00 272.0    5. CKD (chronic kidney disease) stage 3, GFR 30-59 ml/min (HCC)  N18.3 585.3      No orders of the defined types were placed in this encounter. Plan:     1. Hypertension:  HTN well controlled  2. History renal artery stenosis: Stented 2015 [address]. [address] 9/2019 stents patent. 3. High Cholesterol: On crestor 5mg  LDL up 122 from last check discussed diet exercise if not below 100 at next check recommend uptitration to crestor 10mg due to carotid stenosis      4. Right carotid stenosis: Less than 50% stenosis of the right ICA. Continue statin and aspirin therapy. Repeat carotid end of this year, + bruit Rt carotid   5. CKD: Creatinine 1.54, GFR down 38. Has been referred to nephrology by Dr. Ramona Ramos. 6. Reporting EGAN and intermittent left chest tightness: Will evaluate with ECHO and NICOLAS scan NST  Follow-up in 6 months, sooner if abnormal diagnostic studies    Isaiah Whyte, NP      Please note that this dictation was completed with RVX, the computer voice recognition software. Quite often unanticipated grammatical, syntax, homophones, and other interpretive errors are inadvertently transcribed by the computer software. Please disregard these errors. Please excuse any errors that have escaped final proofreading. Thank you.

## 2020-08-05 ENCOUNTER — OFFICE VISIT (OUTPATIENT)
Dept: CARDIOLOGY CLINIC | Age: 75
End: 2020-08-05
Payer: MEDICARE

## 2020-08-05 VITALS
SYSTOLIC BLOOD PRESSURE: 120 MMHG | HEIGHT: 67 IN | RESPIRATION RATE: 18 BRPM | WEIGHT: 185.8 LBS | OXYGEN SATURATION: 100 % | BODY MASS INDEX: 29.16 KG/M2 | DIASTOLIC BLOOD PRESSURE: 80 MMHG | HEART RATE: 70 BPM

## 2020-08-05 DIAGNOSIS — N18.30 CKD (CHRONIC KIDNEY DISEASE) STAGE 3, GFR 30-59 ML/MIN (HCC): ICD-10-CM

## 2020-08-05 DIAGNOSIS — I10 ESSENTIAL HYPERTENSION, MALIGNANT: ICD-10-CM

## 2020-08-05 DIAGNOSIS — R07.89 OTHER CHEST PAIN: ICD-10-CM

## 2020-08-05 DIAGNOSIS — I11.9 HYPERTENSIVE LEFT VENTRICULAR HYPERTROPHY, WITHOUT HEART FAILURE: Primary | ICD-10-CM

## 2020-08-05 DIAGNOSIS — I70.1 BILATERAL RENAL ARTERY STENOSIS (HCC): ICD-10-CM

## 2020-08-05 DIAGNOSIS — R06.09 DOE (DYSPNEA ON EXERTION): ICD-10-CM

## 2020-08-05 DIAGNOSIS — E78.00 HIGH CHOLESTEROL: ICD-10-CM

## 2020-08-05 PROCEDURE — G8419 CALC BMI OUT NRM PARAM NOF/U: HCPCS | Performed by: NURSE PRACTITIONER

## 2020-08-05 PROCEDURE — 99214 OFFICE O/P EST MOD 30 MIN: CPT | Performed by: NURSE PRACTITIONER

## 2020-08-05 PROCEDURE — 93000 ELECTROCARDIOGRAM COMPLETE: CPT | Performed by: NURSE PRACTITIONER

## 2020-08-05 PROCEDURE — G8536 NO DOC ELDER MAL SCRN: HCPCS | Performed by: NURSE PRACTITIONER

## 2020-08-05 PROCEDURE — G8399 PT W/DXA RESULTS DOCUMENT: HCPCS | Performed by: NURSE PRACTITIONER

## 2020-08-05 PROCEDURE — 1090F PRES/ABSN URINE INCON ASSESS: CPT | Performed by: NURSE PRACTITIONER

## 2020-08-05 PROCEDURE — G8754 DIAS BP LESS 90: HCPCS | Performed by: NURSE PRACTITIONER

## 2020-08-05 PROCEDURE — G8427 DOCREV CUR MEDS BY ELIG CLIN: HCPCS | Performed by: NURSE PRACTITIONER

## 2020-08-05 PROCEDURE — 1101F PT FALLS ASSESS-DOCD LE1/YR: CPT | Performed by: NURSE PRACTITIONER

## 2020-08-05 PROCEDURE — G8432 DEP SCR NOT DOC, RNG: HCPCS | Performed by: NURSE PRACTITIONER

## 2020-08-05 PROCEDURE — G8752 SYS BP LESS 140: HCPCS | Performed by: NURSE PRACTITIONER

## 2020-08-05 NOTE — PROGRESS NOTES
Chief Complaint   Patient presents with    Cholesterol Problem     6 month follow up, c/o discomfort under left breast but didn't start until after shingles     1. Have you been to the ER, urgent care clinic since your last visit? Hospitalized since your last visit? No    2. Have you seen or consulted any other health care providers outside of the [address] [place] [address] since your last visit? Include any pap smears or colon screening.  No

## 2020-08-08 ENCOUNTER — HOSPITAL ENCOUNTER (OUTPATIENT)
Dept: PREADMISSION TESTING | Age: 75
Discharge: HOME | End: 2020-08-08
Attending: NURSE PRACTITIONER
Payer: MEDICARE

## 2020-08-08 PROCEDURE — 87635 SARS-COV-2 COVID-19 AMP PRB: CPT

## 2020-08-09 LAB
HEALTH STATUS, XMCV2T: NORMAL
SARS-COV-2, COV2NT: NOT DETECTED
SOURCE, COVRS: NORMAL
SPECIMEN SOURCE, FCOV2M: NORMAL
SPECIMEN TYPE, XMCV1T: NORMAL

## 2020-08-13 ENCOUNTER — HOSPITAL ENCOUNTER (OUTPATIENT)
Dept: NUCLEAR MEDICINE | Age: 75
Discharge: HOME | End: 2020-08-13
Attending: NURSE PRACTITIONER
Payer: MEDICARE

## 2020-08-13 ENCOUNTER — HOSPITAL ENCOUNTER (OUTPATIENT)
Dept: NON INVASIVE DIAGNOSTICS | Age: 75
Discharge: HOME | End: 2020-08-13
Attending: NURSE PRACTITIONER
Payer: MEDICARE

## 2020-08-13 DIAGNOSIS — E78.00 HIGH CHOLESTEROL: ICD-10-CM

## 2020-08-13 DIAGNOSIS — I70.1 BILATERAL RENAL ARTERY STENOSIS (HCC): ICD-10-CM

## 2020-08-13 DIAGNOSIS — I11.9 HYPERTENSIVE LEFT VENTRICULAR HYPERTROPHY, WITHOUT HEART FAILURE: ICD-10-CM

## 2020-08-13 DIAGNOSIS — I10 ESSENTIAL HYPERTENSION, MALIGNANT: ICD-10-CM

## 2020-08-13 DIAGNOSIS — N18.30 CKD (CHRONIC KIDNEY DISEASE) STAGE 3, GFR 30-59 ML/MIN (HCC): ICD-10-CM

## 2020-08-13 LAB
STRESS BASELINE DIAS BP: 80 MMHG
STRESS BASELINE HR: 64 BPM
STRESS BASELINE SYS BP: 182 MMHG
STRESS O2 SAT PEAK: 100 %
STRESS O2 SAT REST: 98 %
STRESS PEAK DIAS BP: 82 MMHG
STRESS PEAK SYS BP: 183 MMHG
STRESS PERCENT HR ACHIEVED: 53 %
STRESS POST PEAK HR: 76 BPM
STRESS RATE PRESSURE PRODUCT: NORMAL BPM*MMHG
STRESS TARGET HR: 144 BPM

## 2020-08-13 PROCEDURE — A9500 TC99M SESTAMIBI: HCPCS

## 2020-08-13 PROCEDURE — 93017 CV STRESS TEST TRACING ONLY: CPT

## 2020-08-13 PROCEDURE — 74011250636 HC RX REV CODE- 250/636: Performed by: NURSE PRACTITIONER

## 2020-08-13 RX ORDER — SODIUM CHLORIDE 0.9 % (FLUSH) 0.9 %
10 SYRINGE (ML) INJECTION AS NEEDED
Status: DISCONTINUED | OUTPATIENT
Start: 2020-08-13 | End: 2020-08-17 | Stop reason: HOSPADM

## 2020-08-13 RX ADMIN — Medication 10 ML: at 10:19

## 2020-08-13 RX ADMIN — REGADENOSON 0.4 MG: 0.08 INJECTION, SOLUTION INTRAVENOUS at 10:19

## 2020-08-13 NOTE — PROGRESS NOTES
Spoke with patient  Verified patient with 2 patient identifiers  Informed per Anny Richards NP stress test is normal  Patient verbalized understanding.

## 2020-08-17 ENCOUNTER — HOSPITAL ENCOUNTER (OUTPATIENT)
Dept: NON INVASIVE DIAGNOSTICS | Age: 75
Discharge: HOME | End: 2020-08-17
Attending: NURSE PRACTITIONER
Payer: MEDICARE

## 2020-08-17 ENCOUNTER — HOSPITAL ENCOUNTER (OUTPATIENT)
Dept: VASCULAR SURGERY | Age: 75
Discharge: HOME | End: 2020-08-17
Attending: NURSE PRACTITIONER
Payer: MEDICARE

## 2020-08-17 VITALS
HEIGHT: 67 IN | DIASTOLIC BLOOD PRESSURE: 80 MMHG | WEIGHT: 185.85 LBS | BODY MASS INDEX: 29.17 KG/M2 | SYSTOLIC BLOOD PRESSURE: 120 MMHG

## 2020-08-17 DIAGNOSIS — I70.1 BILATERAL RENAL ARTERY STENOSIS (HCC): ICD-10-CM

## 2020-08-17 DIAGNOSIS — I11.9 HYPERTENSIVE LEFT VENTRICULAR HYPERTROPHY, WITHOUT HEART FAILURE: ICD-10-CM

## 2020-08-17 DIAGNOSIS — I10 ESSENTIAL HYPERTENSION, MALIGNANT: ICD-10-CM

## 2020-08-17 DIAGNOSIS — E78.00 HIGH CHOLESTEROL: ICD-10-CM

## 2020-08-17 DIAGNOSIS — N18.30 CKD (CHRONIC KIDNEY DISEASE) STAGE 3, GFR 30-59 ML/MIN (HCC): ICD-10-CM

## 2020-08-17 LAB
ECHO AR MAX VEL PISA: 316.97 CM/S
ECHO AV AREA PEAK VELOCITY: 1.77 CM2
ECHO AV AREA/BSA PEAK VELOCITY: 0.9 CM2/M2
ECHO AV PEAK GRADIENT: 7.61 MMHG
ECHO AV PEAK VELOCITY: 137.9 CM/S
ECHO AV REGURGITANT PHT: 0.81 S
ECHO EST RA PRESSURE: 10 MMHG
ECHO LA AREA 4C: 20.35 CM2
ECHO LA TO AORTIC ROOT RATIO: 1.56
ECHO LA VOL 4C: 63.97 ML (ref 22–52)
ECHO LA VOLUME INDEX A4C: 32.64 ML/M2 (ref 16–28)
ECHO LV E' LATERAL VELOCITY: 5.71 CM/S
ECHO LV E' SEPTAL VELOCITY: 4.26 CM/S
ECHO LV EDV A4C: 70.59 ML
ECHO LV EDV INDEX A4C: 36 ML/M2
ECHO LV EJECTION FRACTION A4C: 76 PERCENT
ECHO LV ESV A4C: 17.08 ML
ECHO LV ESV INDEX A4C: 8.7 ML/M2
ECHO LV INTERNAL DIMENSION DIASTOLIC MMODE: 5.74 CM
ECHO LV INTERNAL DIMENSION SYSTOLIC MMODE: 3.32 CM
ECHO LV IVSD MMODE: 0.95 CM
ECHO LV IVSS MMODE: 1.07 CM
ECHO LV POSTERIOR WALL DIASTOLIC MMODE: 0.92 CM
ECHO LV POSTERIOR WALL SYSTOLIC MMODE: 1.53 CM
ECHO LVOT DIAM: 1.66 CM
ECHO LVOT PEAK GRADIENT: 5.17 MMHG
ECHO LVOT PEAK VELOCITY: 113.71 CM/S
ECHO MV A VELOCITY: 118.65 CM/S
ECHO MV E DECELERATION TIME (DT): 0.19 S
ECHO MV E VELOCITY: 86.44 CM/S
ECHO MV E/A RATIO: 0.73
ECHO MV E/E' LATERAL: 15.14
ECHO MV E/E' RATIO (AVERAGED): 17.71
ECHO MV E/E' SEPTAL: 20.29
ECHO PV PEAK INSTANTANEOUS GRADIENT SYSTOLIC: 3.67 MMHG
ECHO PV REGURGITANT MAX VELOCITY: 139.38 CM/S
ECHO PV REGURGITANT MAX VELOCITY: 408.73 CM/S
ECHO PV REGURGITANT MAX VELOCITY: 95.73 CM/S
ECHO RIGHT VENTRICULAR SYSTOLIC PRESSURE (RVSP): 34.67 MMHG
ECHO RV INTERNAL DIMENSION: 2.83 CM
ECHO TV A WAVE: 51.37 CM/S
ECHO TV E WAVE: 48.68 CM/S
ECHO TV REGURGITANT MAX VELOCITY: 247.44 CM/S
ECHO TV REGURGITANT PEAK GRADIENT: 24.67 MMHG

## 2020-08-17 PROCEDURE — 93880 EXTRACRANIAL BILAT STUDY: CPT

## 2020-08-17 PROCEDURE — 93306 TTE W/DOPPLER COMPLETE: CPT

## 2020-08-18 LAB
LEFT CCA DIST DIAS: 12.9 CM/S
LEFT CCA DIST SYS: 72.2 CM/S
LEFT CCA PROX DIAS: 15.1 CM/S
LEFT CCA PROX SYS: 74.4 CM/S
LEFT ECA DIAS: 10.8 CM/S
LEFT ECA SYS: 74.7 CM/S
LEFT ICA DIST DIAS: 20.9 CM/S
LEFT ICA DIST SYS: 62 CM/S
LEFT ICA MID DIAS: 18.3 CM/S
LEFT ICA MID SYS: 56.4 CM/S
LEFT ICA PROX DIAS: 12.2 CM/S
LEFT ICA PROX SYS: 44.1 CM/S
LEFT ICA/CCA SYS: 0.9
LEFT SUBCLAVIAN DIAS: 0 CM/S
LEFT SUBCLAVIAN SYS: 151.2 CM/S
LEFT VERTEBRAL DIAS: 18.1 CM/S
LEFT VERTEBRAL SYS: 61.9 CM/S
RIGHT CCA DIST DIAS: 12.9 CM/S
RIGHT CCA DIST SYS: 80.7 CM/S
RIGHT CCA PROX DIAS: 16.8 CM/S
RIGHT CCA PROX SYS: 80.7 CM/S
RIGHT ECA DIAS: 10.3 CM/S
RIGHT ECA SYS: 82 CM/S
RIGHT ICA DIST DIAS: 22 CM/S
RIGHT ICA DIST SYS: 76.8 CM/S
RIGHT ICA MID DIAS: 19.4 CM/S
RIGHT ICA MID SYS: 78.1 CM/S
RIGHT ICA PROX DIAS: 23.3 CM/S
RIGHT ICA PROX SYS: 79.4 CM/S
RIGHT ICA/CCA SYS: 1
RIGHT SUBCLAVIAN DIAS: 0 CM/S
RIGHT SUBCLAVIAN SYS: 105.5 CM/S
RIGHT VERTEBRAL DIAS: 11.6 CM/S
RIGHT VERTEBRAL SYS: 37.7 CM/S

## 2020-08-18 NOTE — PROGRESS NOTES
Good morning: Please call patient and let her know her heart ultrasound looks good as well as her stress test, awaiting the results of the sonogram of her carotid arteries, will give results once posted.   Thanks

## 2020-08-18 NOTE — PROGRESS NOTES
Placed call to pt. Two pt identifiers confirmed. Pt informed per NP Rodrigo Hair ultrasound looks good as well as her stress test, awaiting the results of the sonogram of her carotid arteries, will give results once posted. \" Pt verbalized understanding of information discussed w/ no further questions at this time.

## 2021-01-05 ENCOUNTER — TRANSCRIBE ORDER (OUTPATIENT)
Dept: SCHEDULING | Age: 76
End: 2021-01-05

## 2021-01-05 DIAGNOSIS — Z12.31 SCREENING MAMMOGRAM, ENCOUNTER FOR: Primary | ICD-10-CM

## 2021-01-14 ENCOUNTER — VIRTUAL VISIT (OUTPATIENT)
Dept: INTERNAL MEDICINE CLINIC | Age: 76
End: 2021-01-14
Payer: MEDICARE

## 2021-01-14 DIAGNOSIS — Z12.39 BREAST SCREENING: Primary | ICD-10-CM

## 2021-01-14 DIAGNOSIS — I10 ESSENTIAL HYPERTENSION: ICD-10-CM

## 2021-01-14 DIAGNOSIS — N18.30 STAGE 3 CHRONIC KIDNEY DISEASE, UNSPECIFIED WHETHER STAGE 3A OR 3B CKD (HCC): ICD-10-CM

## 2021-01-14 DIAGNOSIS — E78.00 PURE HYPERCHOLESTEROLEMIA: ICD-10-CM

## 2021-01-14 DIAGNOSIS — I65.23 CAROTID STENOSIS, ASYMPTOMATIC, BILATERAL: ICD-10-CM

## 2021-01-14 PROCEDURE — 1101F PT FALLS ASSESS-DOCD LE1/YR: CPT | Performed by: INTERNAL MEDICINE

## 2021-01-14 PROCEDURE — 1090F PRES/ABSN URINE INCON ASSESS: CPT | Performed by: INTERNAL MEDICINE

## 2021-01-14 PROCEDURE — G0463 HOSPITAL OUTPT CLINIC VISIT: HCPCS | Performed by: INTERNAL MEDICINE

## 2021-01-14 PROCEDURE — 99442 PR PHYS/QHP TELEPHONE EVALUATION 11-20 MIN: CPT | Performed by: INTERNAL MEDICINE

## 2021-01-14 PROCEDURE — G8510 SCR DEP NEG, NO PLAN REQD: HCPCS | Performed by: INTERNAL MEDICINE

## 2021-01-14 PROCEDURE — G8756 NO BP MEASURE DOC: HCPCS | Performed by: INTERNAL MEDICINE

## 2021-01-14 PROCEDURE — G8427 DOCREV CUR MEDS BY ELIG CLIN: HCPCS | Performed by: INTERNAL MEDICINE

## 2021-01-14 PROCEDURE — G8399 PT W/DXA RESULTS DOCUMENT: HCPCS | Performed by: INTERNAL MEDICINE

## 2021-01-14 NOTE — PATIENT INSTRUCTIONS
This is an established visit conducted via telemedicine. The patient has been instructed that this meets HIPAA criteria and acknowledges and agrees to this method of visitation.  
 
Luc Live 
60/51/17 
88:84 PM

## 2021-01-14 NOTE — PROGRESS NOTES
HISTORY OF PRESENT ILLNESS  Edris Severs is a 67 y.o. female. HPI   Edris Severs, who was evaluated through a synchronous (real-time) audio only encounter, and/or her healthcare decision maker, is aware that it is a billable service, with coverage as determined by her insurance carrier. She provided verbal consent to proceed: Yes, and patient identification was verified. It was conducted pursuant to the emergency declaration under the [facility], [address] [place] [address] authority and the [employer] Act. A caregiver was present when appropriate. Ability to conduct physical exam was limited. I was at home. The patient was at home. VV via telephone encounter x 15 minutes d/t covid 19 pandemic        An electronic signature was used to authenticate this note.     F/u HTN HLD postherpetic neuralgia. leg edema ckd 3 low vit d, mild right carotid carotid artery stenosis, MDD   Last labs   on crestor 5 mg every day   carotid dopplers-mild stenosis b/l  Sees Dr Junie Knight for CKD 3 and KALPESH--s/p stents  Denies any chest pain   Denies feeling depressed  Creatinine 2 months ago down to 1.3 from 1.5  Had NST last year-nl ef , no ischemia  Home BP wnl per pt  Last OV       Home BP -140/78 hr 67  mdd and anxiety controlled witout medication per pt     No cp or sob      Patient Active Problem List    Diagnosis Date Noted    Essential hypertension, malignant 04/26/2015     Priority: 1 - One     Class: Present on Admission    Early satiety 08/13/2019    CKD (chronic kidney disease) stage 3, GFR 30-59 ml/min 08/13/2019    Osteopenia of hip 04/22/2019    Vitamin D deficiency 04/12/2019    Syncope and collapse 09/30/2018    Syncope due to orthostatic hypotension 09/30/2018    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    Carotid bruit present 12/05/2017    Bilateral carotid artery stenosis 12/05/2017    History of tubal ligation 09/14/2015    Bilateral renal artery stenosis (HCC)--s/p PTA/stenting 7/10/15 [facility] 08/09/2015    Stress at home 06/13/2015    H/O gastroesophageal reflux (GERD) 03/12/2015    Obesity 09/04/2013    Edema of both legs--chronic venous insufficiency,amlodipine 09/04/2013    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 09/04/2013    High cholesterol     Left ventricular hypertrophy due to hypertensive disease      Current Outpatient Medications   Medication Sig Dispense Refill    potassium chloride SR (KLOR-CON 10) 10 mEq tablet TAKE 1 TABLET BY MOUTH TWICE DAILY 60 Tab 6    Biotin 2,500 mcg cap Take  by mouth.  furosemide (LASIX) 20 mg tablet TAKE 1 TABLET BY MOUTH DAILY 90 Tab 3    rosuvastatin (CRESTOR) 5 mg tablet TAKE 1 TABLET BY MOUTH DAILY 90 Tab 3    NIFEdipine ER (ADALAT CC) 60 mg ER tablet TAKE 1 TABLET BY MOUTH EVERY DAY 90 Tab 3    beclomethasone (QVAR) 80 mcg/actuation aero INHALE 1 PUFF BY MOUTH ONCE DAILY AS NEEDED 1 Inhaler 0    ubidecarenone/vitamin E mixed (COQ10  PO) Take  by mouth.  cholecalciferol (VITAMIN D3) 1,000 unit tablet Take  by mouth daily.  acetaminophen (TYLENOL) 500 mg tablet Take 500 mg by mouth every six (6) hours as needed for Pain.  aspirin delayed-release 81 mg tablet Take 81 mg by mouth daily.  Indications: PT states stopping 7/22-7/26 for Eye Surgery       Allergies   Allergen Reactions    Amlodipine Other (comments)     Itching,  Muscle cramps    Clonidine Swelling    Ibuprofen Swelling    Levaquin [Levofloxacin] Unknown (comments)    Lisinopril Angioedema    Other Medication Rash and Itching     Patient reports she is allergic to the PPD test for TB    Pcn [Penicillins] Swelling    Pravastatin Myalgia    Sulfa (Sulfonamide Antibiotics) Hives      Lab Results   Component Value Date/Time    WBC 4.8 07/28/2020 09:10 AM    HGB 12.8 07/28/2020 09:10 AM    HCT 37.9 07/28/2020 09:10 AM    PLATELET 362 78/13/4631 09:10 AM    MCV 90 07/28/2020 09:10 AM     Lab Results   Component Value Date/Time    Glucose 93 07/28/2020 09:10 AM    Glucose (POC) 96 04/27/2015 09:57 PM    Microalb/Creat ratio (ug/mg creat.) 62.5 (H) 04/02/2015 09:06 AM    LDL, calculated 122 (H) 07/28/2020 09:10 AM    Creatinine (POC) 1.1 10/09/2019 09:56 AM    Creatinine 1.52 (H) 07/28/2020 09:10 AM      Lab Results   Component Value Date/Time    Cholesterol, total 218 (H) 07/28/2020 09:10 AM    HDL Cholesterol 76 07/28/2020 09:10 AM    LDL, calculated 122 (H) 07/28/2020 09:10 AM    Triglyceride 98 07/28/2020 09:10 AM     Lab Results   Component Value Date/Time    GFR est non-AA 33 (L) 07/28/2020 09:10 AM    GFRNA, POC 48 (L) 10/09/2019 09:56 AM    GFR est AA 38 (L) 07/28/2020 09:10 AM    GFRAA, POC 59 (L) 10/09/2019 09:56 AM    Creatinine 1.52 (H) 07/28/2020 09:10 AM    Creatinine (POC) 1.1 10/09/2019 09:56 AM    BUN 22 07/28/2020 09:10 AM    Sodium 142 07/28/2020 09:10 AM    Potassium 4.6 07/28/2020 09:10 AM    Chloride 103 07/28/2020 09:10 AM    CO2 22 07/28/2020 09:10 AM    Magnesium 1.7 02/07/2010 03:30 AM        ROS    Physical Exam  Vitals signs and nursing note reviewed. Constitutional:       Appearance: She is well-developed. Comments: Appears stated age   Cardiovascular:      Rate and Rhythm: Normal rate and regular rhythm. Heart sounds: Normal heart sounds. No murmur. No friction rub. No gallop. Pulmonary:      Effort: Pulmonary effort is normal. No respiratory distress. Breath sounds: Normal breath sounds. No wheezing. Abdominal:      General: Bowel sounds are normal.      Palpations: Abdomen is soft. Neurological:      Mental Status: She is alert. ASSESSMENT and PLAN  Diagnoses and all orders for this visit:    1. Breast screening  -     TIFFANY 3D JONE W MAMMO BI SCREENING INCL CAD; Future    2. Essential hypertension   Controlled per home readings  3.  Stage 3 chronic kidney disease, unspecified whether stage 3a or 3b CKD   Creatinine donw to 1.3 on recent labs   Fu Dr Villarreal  4. Pure hypercholesterolemia   On crestor   Labs next OV  5. Carotid stenosis, asymptomatic, bilateral   Mild, stable,      rtc 6 months medicare wellness

## 2021-01-18 ENCOUNTER — TRANSCRIBE ORDER (OUTPATIENT)
Dept: SCHEDULING | Age: 76
End: 2021-01-18

## 2021-01-18 DIAGNOSIS — Z12.31 VISIT FOR SCREENING MAMMOGRAM: Primary | ICD-10-CM

## 2021-01-27 RX ORDER — NIFEDIPINE 60 MG/1
TABLET, EXTENDED RELEASE ORAL
Qty: 90 TAB | Refills: 3 | Status: SHIPPED | OUTPATIENT
Start: 2021-01-27 | End: 2022-01-22

## 2021-02-05 ENCOUNTER — VIRTUAL VISIT (OUTPATIENT)
Dept: CARDIOLOGY CLINIC | Age: 76
End: 2021-02-05
Payer: MEDICARE

## 2021-02-05 DIAGNOSIS — E78.00 HIGH CHOLESTEROL: ICD-10-CM

## 2021-02-05 DIAGNOSIS — N18.30 STAGE 3 CHRONIC KIDNEY DISEASE, UNSPECIFIED WHETHER STAGE 3A OR 3B CKD (HCC): ICD-10-CM

## 2021-02-05 DIAGNOSIS — I70.1 BILATERAL RENAL ARTERY STENOSIS (HCC): ICD-10-CM

## 2021-02-05 DIAGNOSIS — I11.9 HYPERTENSIVE LEFT VENTRICULAR HYPERTROPHY, WITHOUT HEART FAILURE: Primary | ICD-10-CM

## 2021-02-05 PROCEDURE — 99442 PR PHYS/QHP TELEPHONE EVALUATION 11-20 MIN: CPT | Performed by: NURSE PRACTITIONER

## 2021-02-05 NOTE — PROGRESS NOTES
[facility]                                                                                  Rajesh Islas DNP, [facility]    Cardiology Telephone Encounter                                                          Pursuant to the emergency declaration under the [facility], [facility] waiver authority and the [employer] Act, this Virtual  Visit was conducted, with patient's consent, to reduce the patient's risk of exposure to COVID-19 and provide continuity of care for an established patient. Services were provided through a telephone synchronous discussion virtually to substitute for in-person clinic visit. Subjective/HPI:   Brett Trejo is a 67 y.o. female who was evaluated by telephone encounter today. She reports feeling well in her usual state of health blood pressure this morning 128/70. Compliant with medications. Under some increased level of stress as recent death of her granddaughter. She denies dyspnea on exertion chest pain shortness of breath lightheadedness dizziness or fatigue. ECHO 8/2020  Interpretation Summary    · LV: Estimated LVEF is 55 - 60%. Visually measured ejection fraction. Normal wall thickness and systolic function (ejection fraction normal). Dilated left ventricle. · LA: Moderately dilated left atrium. · AV: Mild aortic valve regurgitation is present. · MV: Mitral valve thickening. Mild mitral valve regurgitation is present. · PA: Pulmonary arterial systolic pressure is 35 mmHg. 8/2020 Carotid  Interpretation Summary    · There is mild stenosis in the right ICA (<50%). · There is mild stenosis in the left ICA (<50%). · The right vertebral is antegrade. · The left vertebral is antegrade     Nuclear Stress Test 8/2020  Gated SPECT images reviewed in 3 planes show appropriate LV systolic wall thickening.  There is no segmental wall motion abnormality of the left ventricular myocardium. The calculated LV ejection fraction is 77%. Pulmonary uptake and left ventricular cavity size appear normal.  IMPRESSION: No ischemia demonstrated. No infarct visible. LVEF 77%. 8/2020 Visit  1. Hypertension:  HTN well controlled  2. History renal artery stenosis: Stented 2015 Dr. Sandra Onofre, CTA 9/2019 stents patent.   3. High Cholesterol: On crestor 5mg  LDL up 122 from last check discussed diet exercise if not below 100 at next check recommend uptitration to crestor 10mg due to carotid stenosis      4. Right carotid stenosis: Less than 50% stenosis of the right ICA.  Continue statin and aspirin therapy. Repeat carotid end of this year, + bruit Rt carotid   5. CKD: Creatinine 1.54, GFR down 38. Has been referred to nephrology by Dr. Bing Casillas. 6. Reporting EGAN and intermittent left chest tightness: Will evaluate with ECHO and NICOLAS scan NST  Follow-up in 6 months, sooner if abnormal diagnostic studies  PCP Provider  Ariana Jones MD  Past Medical History:   Diagnosis Date    Arthritis     hands    Asthma     Essential hypertension     GERD (gastroesophageal reflux disease)     High cholesterol     HTN (hypertension)     Ill-defined condition     hyperlipidemia    Left ventricular hypertrophy due to hypertensive disease     Renal artery stenosis ([facility] [address]     Shingles 08/11/2018      Past Surgical History:   Procedure Laterality Date    HX BREAST BIOPSY Left 20 years ago    negative    HX CATARACT REMOVAL  07/2018    right eye    HX GI      open inquinal hernia repair    HX GI      6/29/18:  pt reports mulitple abdominal surgeries but unable to recall exact types    HX GYN      tubal ligation    HX HEART CATHETERIZATION  2000    normal    HX UROLOGICAL      Bilateral Renal stents.      Allergies   Allergen Reactions    Amlodipine Other (comments)     Itching,  Muscle cramps    Clonidine Swelling    Ibuprofen Swelling    Levaquin [Levofloxacin] Unknown (comments)    Lisinopril Angioedema    Other Medication Rash and Itching     Patient reports she is allergic to the PPD test for TB    Pcn [Penicillins] Swelling    Pravastatin Myalgia    Sulfa (Sulfonamide Antibiotics) Hives      Family History   Problem Relation Age of Onset    Cancer Mother     Heart Disease Mother     Heart Disease Father     Hypertension Father     Lung Disease Father     Hypertension Brother     Diabetes Brother     Heart Disease Brother     Kidney Disease Brother     Hypertension Brother       Current Outpatient Medications   Medication Sig    NIFEdipine ER (ADALAT CC) 60 mg ER tablet TAKE 1 TABLET BY MOUTH EVERY DAY    potassium chloride SR (KLOR-CON 10) 10 mEq tablet TAKE 1 TABLET BY MOUTH TWICE DAILY    Biotin 2,500 mcg cap Take  by mouth.  furosemide (LASIX) 20 mg tablet TAKE 1 TABLET BY MOUTH DAILY    rosuvastatin (CRESTOR) 5 mg tablet TAKE 1 TABLET BY MOUTH DAILY    beclomethasone (QVAR) 80 mcg/actuation aero INHALE 1 PUFF BY MOUTH ONCE DAILY AS NEEDED    ubidecarenone/vitamin E mixed (COQ10  PO) Take  by mouth.  cholecalciferol (VITAMIN D3) 1,000 unit tablet Take  by mouth daily.  acetaminophen (TYLENOL) 500 mg tablet Take 500 mg by mouth every six (6) hours as needed for Pain.  aspirin delayed-release 81 mg tablet Take 81 mg by mouth daily. Indications: PT states stopping 7/22-7/26 for Eye Surgery     No current facility-administered medications for this visit. There were no vitals filed for this visit.   Social History     Socioeconomic History    Marital status:      Spouse name: Not on file    Number of children: Not on file    Years of education: Not on file    Highest education level: Not on file   Occupational History    Not on file   Social Needs    Financial resource strain: Not on file    Food insecurity     Worry: Not on file     Inability: Not on file    Transportation needs     Medical: Not on file     Non-medical: Not on file Tobacco Use    Smoking status: Never Smoker    Smokeless tobacco: Never Used   Substance and Sexual Activity    Alcohol use: No     Comment: Drinks Lite Beer    Drug use: Yes     Types: Prescription, OTC     Comment: perocet    Sexual activity: Not Currently     Partners: Male   Lifestyle    Physical activity     Days per week: Not on file     Minutes per session: Not on file    Stress: Not on file   Relationships    Social connections     Talks on phone: Not on file     Gets together: Not on file     Attends [employer] service: Not on file     Active member of club or organization: Not on file     Attends meetings of clubs or organizations: Not on file     Relationship status: Not on file    Intimate partner violence     Fear of current or ex partner: Not on file     Emotionally abused: Not on file     Physically abused: Not on file     Forced sexual activity: Not on file   Other Topics Concern    Not on file   Social History Narrative    Not on file       Review of Symptoms  11 systems reviewed, negative other than as stated in the HPI    Physical Exam:    Respiratory: No audible wheezing completing speaking in full sentences, no coughing  General: Conversive, orientated.       Cardiology Labs:  Lab Results   Component Value Date/Time    Cholesterol, total 218 (H) 07/28/2020 09:10 AM    HDL Cholesterol 76 07/28/2020 09:10 AM    LDL, calculated 122 (H) 07/28/2020 09:10 AM    Triglyceride 98 07/28/2020 09:10 AM       Lab Results   Component Value Date/Time    Sodium 142 07/28/2020 09:10 AM    Potassium 4.6 07/28/2020 09:10 AM    Chloride 103 07/28/2020 09:10 AM    CO2 22 07/28/2020 09:10 AM    Anion gap 10 08/11/2018 09:21 AM    Glucose 93 07/28/2020 09:10 AM    BUN 22 07/28/2020 09:10 AM    Creatinine 1.52 (H) 07/28/2020 09:10 AM    BUN/Creatinine ratio 14 07/28/2020 09:10 AM    GFR est AA 38 (L) 07/28/2020 09:10 AM    GFR est non-AA 33 (L) 07/28/2020 09:10 AM    Calcium 9.9 07/28/2020 09:10 AM Bilirubin, total 0.5 07/28/2020 09:10 AM    Alk. phosphatase 307 (H) 07/28/2020 09:10 AM    Protein, total 7.2 07/28/2020 09:10 AM    Albumin 4.5 07/28/2020 09:10 AM    Globulin 3.7 08/11/2018 09:21 AM    A-G Ratio 1.7 07/28/2020 09:10 AM    ALT (SGPT) 15 07/28/2020 09:10 AM         Assessment:     Diagnoses and all orders for this visit:    1. Hypertensive left ventricular hypertrophy, without heart failure    2. Bilateral renal artery stenosis (HCC)--s/p PTA/stenting 7/10/15 [facility]    3. Stage 3 chronic kidney disease, unspecified whether stage 3a or 3b CKD    4. High cholesterol        ICD-10-CM ICD-9-CM    1. Hypertensive left ventricular hypertrophy, without heart failure  I11.9 402.90    2. Bilateral renal artery stenosis (HCC)--s/p PTA/stenting 7/10/15 [facility]  I70.1 440.1    3. Stage 3 chronic kidney disease, unspecified whether stage 3a or 3b CKD  N18.30 585.3    4. High cholesterol  E78.00 272.0      No orders of the defined types were placed in this encounter. Plan:     1. Hypertension: Dilated LV 8/2020, self reporting blood pressure 120/70 continue current therapy  2. History renal artery stenosis: Stented 2015 Dr. Donaldson Monday, CTA 9/2019 stents patent.   3. High Cholesterol: On Crestor labs per Dr. Raudel Arnold Goal LDL <100  4. Right carotid stenosis: Duplex 8/2020 < 50% bilaterally  5. CKD: Creatinine 1.54, GFR down 38. Dr Ravinder Franz following     Stable from cardiac perspective follow-up in 6 months    We discussed the expected course, resolution and complications of the diagnosis(es) in detail. Medication risks, benefits, costs, interactions, and alternatives were discussed as indicated. I advised her to contact the office if her condition worsens, changes or fails to improve as anticipated. She expressed understanding with the diagnosis(es) and plan  Pao Pearson NP    Greater than 20 minutes was spent in the phone encounter.   Provider located onsite [facility] patient located at her residence in [place]

## 2021-02-05 NOTE — PROGRESS NOTES
Identified pt with two pt identifiers(name and ). Reviewed record in preparation for visit and have obtained necessary documentation. Chief Complaint   Patient presents with    Follow-up         There were no vitals taken for this visit. Pain Scale: /10    Coordination of Care Questionnaire:  :   1. Have you been to the ER, urgent care clinic since your last visit? Hospitalized since your last visit? No    2. Have you seen or consulted any other health care providers outside of the [address] [place] [address] since your last visit? Include any pap smears or colon screening.  No

## 2021-03-12 NOTE — TELEPHONE ENCOUNTER
Future Appointments:  Future Appointments   Date Time Provider Alvina Destiny   4/14/2021 10:00 AM [facility] 3 [facility]   7/22/2021  1:30 PM Carly Emanuel MD [facility] BS AMB   8/6/2021 11:00 AM Bassem Gonzalez NP [facility] BS AMB        Last Appointment With Me:  1/14/2021     Requested Prescriptions     Pending Prescriptions Disp Refills    beclomethasone (Qvar) 80 mcg/actuation aero 1 Inhaler 0     Sig: INHALE 1 PUFF BY MOUTH ONCE DAILY AS NEEDED

## 2021-04-22 ENCOUNTER — HOSPITAL ENCOUNTER (OUTPATIENT)
Dept: MAMMOGRAPHY | Age: 76
Discharge: HOME | End: 2021-04-22
Attending: INTERNAL MEDICINE
Payer: MEDICARE

## 2021-04-22 DIAGNOSIS — Z12.31 VISIT FOR SCREENING MAMMOGRAM: ICD-10-CM

## 2021-04-22 PROCEDURE — 77067 SCR MAMMO BI INCL CAD: CPT

## 2021-05-11 RX ORDER — ROSUVASTATIN CALCIUM 5 MG/1
TABLET, COATED ORAL
Qty: 90 TAB | Refills: 3 | Status: SHIPPED | OUTPATIENT
Start: 2021-05-11 | End: 2022-05-27 | Stop reason: SDUPTHER

## 2021-05-11 RX ORDER — POTASSIUM CHLORIDE 750 MG/1
TABLET, FILM COATED, EXTENDED RELEASE ORAL
Qty: 60 TAB | Refills: 6 | Status: CANCELLED | OUTPATIENT
Start: 2021-05-11

## 2021-05-11 RX ORDER — FUROSEMIDE 20 MG/1
TABLET ORAL
Qty: 90 TAB | Refills: 3 | Status: SHIPPED | OUTPATIENT
Start: 2021-05-11 | End: 2022-06-29 | Stop reason: SDUPTHER

## 2021-05-11 NOTE — TELEPHONE ENCOUNTER
SUBJECTIVE: Sindy is here for followup of     1. Essential hypertension    2. Lateral epicondylitis of right elbow    3. Fatigue, unspecified type    4. Need for vaccination    5. Visit for screening mammogram    6. Need for hepatitis C screening test      Patient has symptoms of dry cough and has upper respiratory infection for the 4th time this year. About a month ago fell on the ice and elbow right really hurts and the fingers are numb and symptoms aggravated by lifting weights a couple of days ago. She also has low back pain that goes to the time of the fall. Midline ache better with time aggravated by bending and twisting and lifting     meds and allergies are reviewed.     She is currently on:    Current Outpatient Prescriptions   Medication Sig Dispense Refill   • terbinafine (LAMISIL) 250 MG tablet Take 250 mg by mouth daily.     • hydrochlorothiazide (HYDRODIURIL) 25 MG tablet Take 1 tablet by mouth daily. 90 tablet 1   • lisinopril (PRINIVIL,ZESTRIL) 40 MG tablet Take 1 tablet by mouth daily. 90 tablet 1   • potassium chloride 10 MEQ CR tablet Take 1 tablet by mouth daily. 90 tablet 1   • Formaldehyde 10 % Solution Apply 88.7 mLs topically daily. 1 Bottle 5     No current facility-administered medications for this visit.        Patient Active Problem List    Diagnosis Date Noted   • Essential hypertension 09/26/2014     Priority: Low   • Premenstrual tension syndromes 05/19/2003     Priority: Low       Family History   Problem Relation Age of Onset   • Diabetes Father    • Hypertension Father    • Diabetes Paternal Grandmother    • Heart Maternal Grandfather    • Cancer Mother      bladder   • Heart Mother      CAD   • Heart Maternal Uncle      X2 early in life   • Asthma Sister    • Musculoskeletal Other      aunt - osteoporosis       Social History   Substance Use Topics   • Smoking status: Former Smoker     Years: 20.00     Types: Cigarettes     Quit date: 2/19/2008   • Smokeless tobacco: Never  Called patient. ID verified with Name and . Spoke with patient in regards to medications. Informed patient that the pharmacy would not fill medications because she is out of refills at this time. Informed patient that I would send a refill request to provider for Furosemide and Rosuvastatin. Informed patient that PCP did not do order for Potassium, order came from cardiologist. Medication request sent to provider at this time. Used   • Alcohol use 1.2 oz/week     1 Glasses of wine, 1 Standard drinks or equivalent per week      Comment: once aweek       CHOLESTEROL (mg/dL)   Date Value   09/01/2017 189     TRIGLYCERIDE (mg/dL)   Date Value   09/01/2017 232 (H)     HDL (mg/dL)   Date Value   09/01/2017 50     CALCULATED LDL (mg/dL)   Date Value   09/01/2017 93       REVIEW OF SYSTEMS: The patient denies symptoms of:    GENERAL: fever, chills, night sweats, weight loss, weight gain and decreased appetite <<PATIENT DOES HAVE SYMPTOMS OF fatigue and weight gain   CARDIORESPIRATORY: chest pain, palpitations, edema, hemoptysis, wheeze and shortness of breath  GASTROINTESTINAL: abdominal pain, nausea, vomiting, constipation, diarrhea, black tarry stools, blood in the stools, change in the bowel habits and sour burps or heart burn  GENITOURINARY: urgency, frequency, dysuria, hematuria and nocturia          OBJECTIVE: Examination of the patient reveals:   Visit Vitals  /82 (BP Location: [facility], Patient Position: Sitting, Cuff Size: Regular)   Pulse 75   Temp 97.9 °F (36.6 °C) (Oral)   Resp 12   Ht 5' 0.5\" (1.537 m)   Wt 65.3 kg   LMP 10/01/2016   SpO2 100%   BMI 27.66 kg/m²     GENERAL: appears stated age, well developed and well nourished, in no distress and normal affect  HEAD: normocephalic  EARS: pinna and external ear is normal bilaterally, external auditory canals are normal, tympanic membranes are normal, middle ear space is normal bilaterally, there is no discomfort on traction of the pinna or palpation of the tragus and auditory acuity is grossly normal  MOUTH: tongue is midline and appears normal, oropharynx appears normal, soft palate and uvula are normal and oral mucosa is normal  NECK: neck is supple, no thyromegaly, no anterior cervical adenopathy, no posterior cervical adenopathy and no supraclavicular adenopathy  CHEST: contour is normal with normal AP diameter, normal respiratory excursion and respiratory effort is not  labored  LUNGS: lungs are clear to auscultation with normal inspiratory/expiratory sounds, no rales, no rhonchi and no wheezes  HEART: normal PMI, normal rate and rhythm, S1 and S2 normal, no S3 or S4, no murmurs and no extra heart sounds  ABDOMEN: abdomen is soft, normal active bowel sounds, nontender, without masses, without hepatomegaly and without splenomegaly  BACK: inspection shows no curvature, no discomfort to palpation of the midline and no costovertebral angle discomfort to palpation  EXTREMITIES: no clubbing, no cyanosis, no edema and normal muscle tone and development bilaterally       Exam of the elbow reveals good active and passive range of motion without obvious swelling, effusion, erythema or ecchymosis. There is moderate discomfort to palpation of the lateral epicondyle and the tissue just distal to it. Opposition of extension at the wrist reproduces the discomfort.     ASSESSMENT: (I10) Essential hypertension  (primary encounter diagnosis)  Comment: blood pressure controlled   Plan: LIPID PANEL WITH REFLEX, COMPREHENSIVE         METABOLIC PANEL, MICROALBUMIN URINE RANDOM        continue current medication     (M77.11) Lateral epicondylitis of right elbow  Comment:    Plan: DISPENSE SUPPLY        tennis elbow splint     (R53.83) Fatigue, unspecified type  Comment: follow with results of lab given the weight gain and fatigue   Plan: THYROID STIMULATING HORMONE REFLEX             (Z23) Need for vaccination  Comment:    Plan: TETANUS DIPHTHERIA ACELLULAR PERTUSSIS VACC,         11+ YRS (ADACEL)             (Z12.31) Visit for screening mammogram  Comment:    Plan: MAMMO SCREENING BILATERAL             (Z11.59) Need for hepatitis C screening test  Comment:    Plan: HEPATITIS C ANTIBODY WITH REFLEX           Cough related to upper respiratory infection and prolonged and patient to let me know if not better over next couple of weeks.     lumbosacral strain discussed with patient and consider physical  therapy but now better.     Return visit/disposition noted below

## 2021-05-11 NOTE — TELEPHONE ENCOUNTER
PCP: Verona Maddox MD    Last appt: 1/14/2021    Future Appointments   Date Time Provider Alvina Dixon   7/22/2021  1:30 PM Verona Maddox MD [facility] BS AMB       Requested Prescriptions     Pending Prescriptions Disp Refills    rosuvastatin (CRESTOR) 5 mg tablet 90 Tab 3    furosemide (LASIX) 20 mg tablet 90 Tab 3       Prior labs and Blood pressures:  BP Readings from Last 3 Encounters:   08/17/20 120/80   08/05/20 120/80   01/15/20 122/72     Lab Results   Component Value Date/Time    Sodium 142 07/28/2020 09:10 AM    Potassium 4.6 07/28/2020 09:10 AM    Chloride 103 07/28/2020 09:10 AM    CO2 22 07/28/2020 09:10 AM    Anion gap 10 08/11/2018 09:21 AM    Glucose 93 07/28/2020 09:10 AM    BUN 22 07/28/2020 09:10 AM    Creatinine 1.52 (H) 07/28/2020 09:10 AM    BUN/Creatinine ratio 14 07/28/2020 09:10 AM    GFR est AA 38 (L) 07/28/2020 09:10 AM    GFR est non-AA 33 (L) 07/28/2020 09:10 AM    Calcium 9.9 07/28/2020 09:10 AM     No results found for: HBA1C, HGBE8, PTH7PAHY, OCY3IKUM  Lab Results   Component Value Date/Time    Cholesterol, total 218 (H) 07/28/2020 09:10 AM    HDL Cholesterol 76 07/28/2020 09:10 AM    LDL, calculated 122 (H) 07/28/2020 09:10 AM    VLDL, calculated 20 07/28/2020 09:10 AM    Triglyceride 98 07/28/2020 09:10 AM     Lab Results   Component Value Date/Time    VITAMIN D, 25-HYDROXY 36.6 10/08/2019 10:23 AM       Lab Results   Component Value Date/Time    TSH 2.220 04/11/2019 12:06 PM

## 2021-05-11 NOTE — TELEPHONE ENCOUNTER
#[phone] OR #[phone]  Pt states she has an upcoming appt and doesn't know why Dr. Regis Bernal won't fill three medications for her. I will put a refill request in for pt.

## 2021-07-22 ENCOUNTER — OFFICE VISIT (OUTPATIENT)
Dept: INTERNAL MEDICINE CLINIC | Age: 76
End: 2021-07-22
Payer: MEDICARE

## 2021-07-22 VITALS
BODY MASS INDEX: 28.93 KG/M2 | SYSTOLIC BLOOD PRESSURE: 140 MMHG | OXYGEN SATURATION: 99 % | HEIGHT: 66 IN | DIASTOLIC BLOOD PRESSURE: 70 MMHG | RESPIRATION RATE: 16 BRPM | TEMPERATURE: 97.8 F | WEIGHT: 180 LBS | HEART RATE: 63 BPM

## 2021-07-22 DIAGNOSIS — Z78.0 MENOPAUSE: ICD-10-CM

## 2021-07-22 DIAGNOSIS — E78.00 PURE HYPERCHOLESTEROLEMIA: ICD-10-CM

## 2021-07-22 DIAGNOSIS — Z12.11 COLON CANCER SCREENING: ICD-10-CM

## 2021-07-22 DIAGNOSIS — I65.23 CAROTID STENOSIS, ASYMPTOMATIC, BILATERAL: ICD-10-CM

## 2021-07-22 DIAGNOSIS — I10 ESSENTIAL HYPERTENSION: ICD-10-CM

## 2021-07-22 DIAGNOSIS — Z00.00 MEDICARE ANNUAL WELLNESS VISIT, SUBSEQUENT: ICD-10-CM

## 2021-07-22 DIAGNOSIS — N18.30 STAGE 3 CHRONIC KIDNEY DISEASE, UNSPECIFIED WHETHER STAGE 3A OR 3B CKD (HCC): Primary | ICD-10-CM

## 2021-07-22 DIAGNOSIS — R74.8 ELEVATED ALKALINE PHOSPHATASE LEVEL: ICD-10-CM

## 2021-07-22 DIAGNOSIS — Z11.59 ENCOUNTER FOR HEPATITIS C SCREENING TEST FOR LOW RISK PATIENT: ICD-10-CM

## 2021-07-22 LAB
COMMENT, HOLDF: NORMAL
SAMPLES BEING HELD,HOLD: NORMAL

## 2021-07-22 PROCEDURE — G8399 PT W/DXA RESULTS DOCUMENT: HCPCS | Performed by: INTERNAL MEDICINE

## 2021-07-22 PROCEDURE — G0463 HOSPITAL OUTPT CLINIC VISIT: HCPCS | Performed by: INTERNAL MEDICINE

## 2021-07-22 PROCEDURE — G8419 CALC BMI OUT NRM PARAM NOF/U: HCPCS | Performed by: INTERNAL MEDICINE

## 2021-07-22 PROCEDURE — 1090F PRES/ABSN URINE INCON ASSESS: CPT | Performed by: INTERNAL MEDICINE

## 2021-07-22 PROCEDURE — 99214 OFFICE O/P EST MOD 30 MIN: CPT | Performed by: INTERNAL MEDICINE

## 2021-07-22 PROCEDURE — G8754 DIAS BP LESS 90: HCPCS | Performed by: INTERNAL MEDICINE

## 2021-07-22 PROCEDURE — G8427 DOCREV CUR MEDS BY ELIG CLIN: HCPCS | Performed by: INTERNAL MEDICINE

## 2021-07-22 PROCEDURE — G0439 PPPS, SUBSEQ VISIT: HCPCS | Performed by: INTERNAL MEDICINE

## 2021-07-22 PROCEDURE — G8753 SYS BP > OR = 140: HCPCS | Performed by: INTERNAL MEDICINE

## 2021-07-22 PROCEDURE — 1101F PT FALLS ASSESS-DOCD LE1/YR: CPT | Performed by: INTERNAL MEDICINE

## 2021-07-22 PROCEDURE — G8536 NO DOC ELDER MAL SCRN: HCPCS | Performed by: INTERNAL MEDICINE

## 2021-07-22 PROCEDURE — G8510 SCR DEP NEG, NO PLAN REQD: HCPCS | Performed by: INTERNAL MEDICINE

## 2021-07-22 NOTE — PROGRESS NOTES
HISTORY OF PRESENT ILLNESS  Alok Rawls is a 67 y.o. female. HPI   F/u HTN , s/p bl renal artery stents for KALPESH HLD postherpetic neuralgia. leg edema ckd 3 low vit d, mild right carotid carotid artery stenosis, osteopenia with low FRAX and medicare wellness--    Last LDl 122 Cr 1.52  Takes lasix every other day now.  Has some leg edema but now using support home  Sometime sstaggers when she walks x 5 months-not really dizzy  Had neg cologuanicholas in 2017    Dx Pagets in 2017 at [facility]--no bone pain -only c/o some arthritis pain in finger  dexa 2 yrs ago --mild osteopenia with low FRAX  Last OV         Last labs   on crestor 5 mg every day   carotid dopplers-mild stenosis b/l  Sees Dr Tommi Lennox for CKD 3 and KALPESH--s/p stents  Denies any chest pain   Denies feeling depressed  Creatinine 2 months ago down to 1.3 from 1.5  Had NST last year-nl ef , no ischemia  Home BP wnl per pt    Patient Active Problem List    Diagnosis Date Noted    Essential hypertension, malignant 04/26/2015    Early satiety 08/13/2019    CKD (chronic kidney disease) stage 3, GFR 30-59 ml/min ([facility] [address] 08/13/2019    Osteopenia of hip 04/22/2019    Vitamin D deficiency 04/12/2019    Syncope and collapse 09/30/2018    Syncope due to orthostatic hypotension 09/30/2018    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    Carotid bruit present 12/05/2017    Bilateral carotid artery stenosis 12/05/2017    History of tubal ligation 09/14/2015    Bilateral renal artery stenosis (HCC)--s/p PTA/stenting 7/10/15 [id number] 08/09/2015    Stress at home 06/13/2015    H/O gastroesophageal reflux (GERD) 03/12/2015    Obesity 09/04/2013    Edema of both legs--chronic venous insufficiency,amlodipine 09/04/2013    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 09/04/2013    High cholesterol     Left ventricular hypertrophy due to hypertensive disease      Current Outpatient Medications   Medication Sig Dispense Refill    rosuvastatin (CRESTOR) 5 mg tablet TAKE 1 TABLET BY MOUTH DAILY 90 Tab 3    furosemide (LASIX) 20 mg tablet TAKE 1 TABLET BY MOUTH DAILY 90 Tab 3    beclomethasone (Qvar) 80 mcg/actuation aero INHALE 1 PUFF BY MOUTH ONCE DAILY AS NEEDED 1 Inhaler 5    NIFEdipine ER (ADALAT CC) 60 mg ER tablet TAKE 1 TABLET BY MOUTH EVERY DAY 90 Tab 3    potassium chloride SR (KLOR-CON 10) 10 mEq tablet TAKE 1 TABLET BY MOUTH TWICE DAILY 60 Tab 6    Biotin 2,500 mcg cap Take  by mouth.  ubidecarenone/vitamin E mixed (COQ10  PO) Take  by mouth.  cholecalciferol (VITAMIN D3) 1,000 unit tablet Take  by mouth daily.  acetaminophen (TYLENOL) 500 mg tablet Take 500 mg by mouth every six (6) hours as needed for Pain.  aspirin delayed-release 81 mg tablet Take 81 mg by mouth daily.  Indications: PT states stopping 7/22-7/26 for Eye Surgery       Allergies   Allergen Reactions    Amlodipine Other (comments)     Itching,  Muscle cramps    Clonidine Swelling    Ibuprofen Swelling    Levaquin [Levofloxacin] Unknown (comments)    Lisinopril Angioedema    Other Medication Rash and Itching     Patient reports she is allergic to the PPD test for TB    Pcn [Penicillins] Swelling    Pravastatin Myalgia    Sulfa (Sulfonamide Antibiotics) Hives      Lab Results   Component Value Date/Time    WBC 4.8 07/28/2020 09:10 AM    HGB 12.8 07/28/2020 09:10 AM    HCT 37.9 07/28/2020 09:10 AM    PLATELET 181 00/94/1052 09:10 AM    MCV 90 07/28/2020 09:10 AM     Lab Results   Component Value Date/Time    Glucose 93 07/28/2020 09:10 AM    Glucose (POC) 96 04/27/2015 09:57 PM    Microalb/Creat ratio (ug/mg creat.) 62.5 (H) 04/02/2015 09:06 AM    LDL, calculated 122 (H) 07/28/2020 09:10 AM    Creatinine (POC) 1.1 10/09/2019 09:56 AM    Creatinine 1.52 (H) 07/28/2020 09:10 AM      Lab Results   Component Value Date/Time    Cholesterol, total 218 (H) 07/28/2020 09:10 AM    HDL Cholesterol 76 07/28/2020 09:10 AM    LDL, calculated 122 (H) 07/28/2020 09:10 AM    Triglyceride 98 07/28/2020 09:10 AM     Lab Results   Component Value Date/Time    ALT (SGPT) 15 07/28/2020 09:10 AM    Alk. phosphatase 307 (H) 07/28/2020 09:10 AM    Bilirubin, total 0.5 07/28/2020 09:10 AM    Albumin 4.5 07/28/2020 09:10 AM    Protein, total 7.2 07/28/2020 09:10 AM    INR 1.0 02/02/2010 03:20 PM    Prothrombin time 10.7 02/02/2010 03:20 PM    PLATELET 697 80/52/6220 09:10 AM     Lab Results   Component Value Date/Time    GFR est non-AA 33 (L) 07/28/2020 09:10 AM    GFRNA, POC 48 (L) 10/09/2019 09:56 AM    GFR est AA 38 (L) 07/28/2020 09:10 AM    GFRAA, POC 59 (L) 10/09/2019 09:56 AM    Creatinine 1.52 (H) 07/28/2020 09:10 AM    Creatinine (POC) 1.1 10/09/2019 09:56 AM    BUN 22 07/28/2020 09:10 AM    Sodium 142 07/28/2020 09:10 AM    Potassium 4.6 07/28/2020 09:10 AM    Chloride 103 07/28/2020 09:10 AM    CO2 22 07/28/2020 09:10 AM    Magnesium 1.7 02/07/2010 03:30 AM     Lab Results   Component Value Date/Time    TSH 2.220 04/11/2019 12:06 PM      Lab Results   Component Value Date/Time    Glucose 93 07/28/2020 09:10 AM    Glucose (POC) 96 04/27/2015 09:57 PM         Review of Systems   Constitutional: Negative for chills, fever, malaise/fatigue and weight loss. Eyes: Negative for blurred vision and double vision. Respiratory: Negative for cough and shortness of breath. Cardiovascular: Negative for chest pain and palpitations. Gastrointestinal: Negative for abdominal pain, blood in stool, constipation, diarrhea, melena, nausea and vomiting. Genitourinary: Negative for dysuria, frequency, hematuria and urgency. Musculoskeletal: Negative for back pain, falls, joint pain and myalgias. Neurological: Negative for dizziness, tremors and headaches. Physical Exam  Vitals and nursing note reviewed. Constitutional:       Appearance: Normal appearance. She is well-developed. Comments: Appears stated age   HENT:      Head: Normocephalic and atraumatic.       Right Ear: Tympanic membrane normal.      Left Ear: Tympanic membrane normal.   Neck:      Comments: rigth carotid bruit  Cardiovascular:      Rate and Rhythm: Normal rate and regular rhythm. Heart sounds: Normal heart sounds. No murmur heard. No friction rub. No gallop. Pulmonary:      Effort: Pulmonary effort is normal. No respiratory distress. Breath sounds: Normal breath sounds. No wheezing. Abdominal:      General: Bowel sounds are normal.      Palpations: Abdomen is soft. Musculoskeletal:      Right lower leg: Edema present. Left lower leg: No edema. Skin:     General: Skin is warm. Neurological:      General: No focal deficit present. Mental Status: She is alert. Comments: Normal gait         ASSESSMENT and PLAN  Diagnoses and all orders for this visit:    1. Stage 3 chronic kidney disease, unspecified whether stage 3a or 3b CKD ([facility] [address]  -     METABOLIC PANEL, COMPREHENSIVE; Future   Fu renal MD  2. Essential hypertension  -     METABOLIC PANEL, COMPREHENSIVE; Future   Reasonable control   Low sodium diet  3. Elevated alkaline phosphatase level  -     METABOLIC PANEL, COMPREHENSIVE; Future  -     GGT; Future   Hx pagets dz-asymptomatic  4. Pure hypercholesterolemia  -     METABOLIC PANEL, COMPREHENSIVE; Future  -     LIPID PANEL; Future  -     TSH 3RD GENERATION; Future   Probably will increase dose of crestor to 10 mg every day--d/w patient  5. Encounter for hepatitis C screening test for low risk patient  -     HEPATITIS C AB; Future    6. Menopause  -     DEXA BONE DENSITY STUDY AXIAL; Future    Other orders  -     COLOGUARD TEST (FECAL DNA COLORECTAL CANCER SCREENING)           This is the Subsequent Medicare Annual Wellness Exam, performed 12 months or more after the Initial AWV or the last Subsequent AWV    I have reviewed the patient's medical history in detail and updated the computerized patient record.        Assessment/Plan   Education and counseling provided:  Are appropriate based on today's review and evaluation  End-of-Life planning (with patient's consent)  Colorectal cancer screening tests  Bone mass measurement (DEXA)    1. Stage 3 chronic kidney disease, unspecified whether stage 3a or 3b CKD (HCC)  -     METABOLIC PANEL, COMPREHENSIVE; Future  2. Essential hypertension  -     METABOLIC PANEL, COMPREHENSIVE; Future  3. Elevated alkaline phosphatase level  -     METABOLIC PANEL, COMPREHENSIVE; Future  -     GGT; Future  4. Pure hypercholesterolemia  -     METABOLIC PANEL, COMPREHENSIVE; Future  -     LIPID PANEL; Future  -     TSH 3RD GENERATION; Future  5. Encounter for hepatitis C screening test for low risk patient  -     HEPATITIS C AB; Future  6. Menopause  -     DEXA BONE DENSITY STUDY AXIAL; Future       Depression Risk Factor Screening     3 most recent PHQ Screens 7/22/2021   Little interest or pleasure in doing things Not at all   Feeling down, depressed, irritable, or hopeless Not at all   Total Score PHQ 2 0       Alcohol Risk Screen    Do you average more than 1 drink per night or more than 7 drinks a week:  No    On any one occasion in the past three months have you have had more than 3 drinks containing alcohol:  No        Functional Ability and Level of Safety    Hearing: Hearing is good. Activities of Daily Living: The home contains: handrails  Patient does total self care      Ambulation: with no difficulty     Fall Risk:  Fall Risk Assessment, last 12 mths 7/22/2021   Able to walk? Yes   Fall in past 12 months? 0   Do you feel unsteady? 0   Are you worried about falling 0   Number of falls in past 12 months -   Fall with injury?  -      Abuse Screen:  Patient is not abused       Cognitive Screening    Has your family/caregiver stated any concerns about your memory: no     Cognitive Screening: Normal - serial 3    Health Maintenance Due     Health Maintenance Due   Topic Date Due    Hepatitis C Screening  Never done    Medicare Yearly Exam  07/15/2021    Lipid Screen  07/28/2021       Patient Care Team   Patient Care Team:  Jordan Dixon MD as PCP - General (Internal Medicine)  Jordan Dixon MD [facility] P[facility]  KALIA Mayfield as Nurse Practitioner (Nurse Practitioner)  Cullen Antunez MD (Ophthalmology)  Vladimir Jones NP as Physician (Nurse Practitioner)    History     Patient Active Problem List   Diagnosis Code    High cholesterol E78.00    Left ventricular hypertrophy due to hypertensive disease I11.9    Obesity E66.9    Edema of both legs--chronic venous insufficiency,amlodipine R60.0    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 R06.83    H/O gastroesophageal reflux (GERD) Z87.19    Essential hypertension, malignant I10    Stress at home F43.9    Bilateral renal artery stenosis (HCC)--s/p PTA/stenting 7/10/15 [facility] I70.1    History of tubal ligation Z98.51    Carotid bruit present R09.89    Bilateral carotid artery stenosis I65.23    Paget's bone disease M88.9    Syncope and collapse R55    Syncope due to orthostatic hypotension I95.1    Vitamin D deficiency E55.9    Osteopenia of hip M85.859    Early satiety R68.81    CKD (chronic kidney disease) stage 3, GFR 30-59 ml/min ([address] N18.30     Past Medical History:   Diagnosis Date    Arthritis     hands    Asthma     Essential hypertension     GERD (gastroesophageal reflux disease)     High cholesterol     HTN (hypertension)     Ill-defined condition     hyperlipidemia    Left ventricular hypertrophy due to hypertensive disease     Renal artery stenosis ([address]     Shingles 08/11/2018      Past Surgical History:   Procedure Laterality Date    HX BREAST BIOPSY Left 20 years ago    negative    HX CATARACT REMOVAL  07/2018    right eye    HX GI      open inquinal hernia repair    HX GI      6/29/18:  pt reports mulitple abdominal surgeries but unable to recall exact types    HX GYN      tubal ligation    HX HEART CATHETERIZATION  2000    normal    HX UROLOGICAL Bilateral Renal stents. Current Outpatient Medications   Medication Sig Dispense Refill    rosuvastatin (CRESTOR) 5 mg tablet TAKE 1 TABLET BY MOUTH DAILY 90 Tab 3    furosemide (LASIX) 20 mg tablet TAKE 1 TABLET BY MOUTH DAILY (Patient taking differently: TAKE 1 TABLET BY MOUTH every other day) 90 Tab 3    beclomethasone (Qvar) 80 mcg/actuation aero INHALE 1 PUFF BY MOUTH ONCE DAILY AS NEEDED 1 Inhaler 5    NIFEdipine ER (ADALAT CC) 60 mg ER tablet TAKE 1 TABLET BY MOUTH EVERY DAY 90 Tab 3    potassium chloride SR (KLOR-CON 10) 10 mEq tablet TAKE 1 TABLET BY MOUTH TWICE DAILY 60 Tab 6    Biotin 2,500 mcg cap Take  by mouth.  ubidecarenone/vitamin E mixed (COQ10  PO) Take  by mouth.  cholecalciferol (VITAMIN D3) 1,000 unit tablet Take  by mouth daily.  acetaminophen (TYLENOL) 500 mg tablet Take 500 mg by mouth every six (6) hours as needed for Pain.  aspirin delayed-release 81 mg tablet Take 81 mg by mouth daily.  Indications: PT states stopping 7/22-7/26 for Eye Surgery       Allergies   Allergen Reactions    Amlodipine Other (comments)     Itching,  Muscle cramps    Clonidine Swelling    Ibuprofen Swelling    Levaquin [Levofloxacin] Unknown (comments)    Lisinopril Angioedema    Other Medication Rash and Itching     Patient reports she is allergic to the PPD test for TB    Pcn [Penicillins] Swelling    Pravastatin Myalgia    Sulfa (Sulfonamide Antibiotics) Hives       Family History   Problem Relation Age of Onset    Cancer Mother     Heart Disease Mother     Heart Disease Father     Hypertension Father     Lung Disease Father     Hypertension Brother     Diabetes Brother     Heart Disease Brother     Kidney Disease Brother     Hypertension Brother      Social History     Tobacco Use    Smoking status: Never Smoker    Smokeless tobacco: Never Used   Substance Use Topics    Alcohol use: No     Comment: Drinks Lite Beer         Marina Pate MD

## 2021-07-22 NOTE — PATIENT INSTRUCTIONS
Office Policies    Phone calls/patient messages:            Please allow up to 24 hours for someone in the office to contact you about your call or message. Be mindful your provider may be out of the office or your message may require further review. We encourage you to use [employer] for your messages as this is a faster, more efficient way to communicate with our office                         Medication Refills:            Prescription medications require 48-72 business hours to process. We encourage you to use [employer] for your refills. For controlled medications: Please allow 72 business hours to process. Certain medications may require you to  a written prescription at our office. NO narcotic/controlled medications will be prescribed after 4pm Monday through Friday or on weekends              Form/Paperwork Completion:            Please note a $25 fee may incur for all paperwork for completed by our providers. We ask that you allow 7-10 business days. Pre-payment is due prior to picking up/faxing the completed form. You may also download your forms to [employer] to have your doctor print off. Medicare Wellness Visit, Female     The best way to live healthy is to have a lifestyle where you eat a well-balanced diet, exercise regularly, limit alcohol use, and quit all forms of tobacco/nicotine, if applicable. Regular preventive services are another way to keep healthy. Preventive services (vaccines, screening tests, monitoring & exams) can help personalize your care plan, which helps you manage your own care. Screening tests can find health problems at the earliest stages, when they are easiest to treat. [facility] follows the current, evidence-based guidelines published by the [facility] [place] (USPSTF) when recommending preventive services for our patients.  Because we follow these guidelines, sometimes recommendations change over time as research supports it. (For example, mammograms used to be recommended annually. Even though Medicare will still pay for an annual mammogram, the newer guidelines recommend a mammogram every two years for women of average risk). Of course, you and your doctor may decide to screen more often for some diseases, based on your risk and your co-morbidities (chronic disease you are already diagnosed with). Preventive services for you include:  - Medicare offers their members a free annual wellness visit, which is time for you and your primary care provider to discuss and plan for your preventive service needs. Take advantage of this benefit every year!  -All adults over the age of 72 should receive the recommended pneumonia vaccines. Current USPSTF guidelines recommend a series of two vaccines for the best pneumonia protection.   -All adults should have a flu vaccine yearly and a tetanus vaccine every 10 years.   -All adults age 48 and older should receive the shingles vaccines (series of two vaccines). -All adults age 38-68 who are overweight should have a diabetes screening test once every three years.   -All adults born between [birth year] and 1966 should be screened once for Hepatitis C.  -Other screening tests and preventive services for persons with diabetes include: an eye exam to screen for diabetic retinopathy, a kidney function test, a foot exam, and stricter control over your cholesterol.   -Cardiovascular screening for adults with routine risk involves an electrocardiogram (ECG) at intervals determined by your doctor.   -Colorectal cancer screenings should be done for adults age 54-65 with no increased risk factors for colorectal cancer. There are a number of acceptable methods of screening for this type of cancer. Each test has its own benefits and drawbacks. Discuss with your doctor what is most appropriate for you during your annual wellness visit.  The different tests include: colonoscopy (considered the best screening method), a fecal occult blood test, a fecal DNA test, and sigmoidoscopy.    -A bone mass density test is recommended when a woman turns 65 to screen for osteoporosis. This test is only recommended one time, as a screening. Some providers will use this same test as a disease monitoring tool if you already have osteoporosis. -Breast cancer screenings are recommended every other year for women of normal risk, age 54-69.  -Cervical cancer screenings for women over age 72 are only recommended with certain risk factors.      Here is a list of your current Health Maintenance items (your personalized list of preventive services) with a due date:  Health Maintenance Due   Topic Date Due    Hepatitis C Test  Never done    Annual Well Visit  07/15/2021    Cholesterol Test   07/28/2021

## 2021-07-23 LAB
ALBUMIN SERPL-MCNC: 4.2 G/DL (ref 3.5–5)
ALBUMIN/GLOB SERPL: 1.1 {RATIO} (ref 1.1–2.2)
ALP SERPL-CCNC: 374 U/L (ref 45–117)
ALT SERPL-CCNC: 23 U/L (ref 12–78)
ANION GAP SERPL CALC-SCNC: 7 MMOL/L (ref 5–15)
AST SERPL-CCNC: 21 U/L (ref 15–37)
BILIRUB SERPL-MCNC: 0.5 MG/DL (ref 0.2–1)
BUN SERPL-MCNC: 19 MG/DL (ref 6–20)
BUN/CREAT SERPL: 14 (ref 12–20)
CALCIUM SERPL-MCNC: 9.8 MG/DL (ref 8.5–10.1)
CHLORIDE SERPL-SCNC: 105 MMOL/L (ref 97–108)
CHOLEST SERPL-MCNC: 213 MG/DL
CO2 SERPL-SCNC: 27 MMOL/L (ref 21–32)
CREAT SERPL-MCNC: 1.33 MG/DL (ref 0.55–1.02)
ERYTHROCYTE [DISTWIDTH] IN BLOOD BY AUTOMATED COUNT: 12.2 % (ref 11.5–14.5)
GGT SERPL-CCNC: 31 U/L (ref 5–55)
GLOBULIN SER CALC-MCNC: 3.8 G/DL (ref 2–4)
GLUCOSE SERPL-MCNC: 98 MG/DL (ref 65–100)
HCT VFR BLD AUTO: 39.6 % (ref 35–47)
HCV AB SERPL QL IA: NONREACTIVE
HCV COMMENT,HCGAC: NORMAL
HDLC SERPL-MCNC: 84 MG/DL
HDLC SERPL: 2.5 {RATIO} (ref 0–5)
HGB BLD-MCNC: 13.1 G/DL (ref 11.5–16)
LDLC SERPL CALC-MCNC: 105.8 MG/DL (ref 0–100)
MCH RBC QN AUTO: 31 PG (ref 26–34)
MCHC RBC AUTO-ENTMCNC: 33.1 G/DL (ref 30–36.5)
MCV RBC AUTO: 93.8 FL (ref 80–99)
NRBC # BLD: 0 K/UL (ref 0–0.01)
NRBC BLD-RTO: 0 PER 100 WBC
PLATELET # BLD AUTO: 316 K/UL (ref 150–400)
PMV BLD AUTO: 11.1 FL (ref 8.9–12.9)
POTASSIUM SERPL-SCNC: 3.9 MMOL/L (ref 3.5–5.1)
PROT SERPL-MCNC: 8 G/DL (ref 6.4–8.2)
RBC # BLD AUTO: 4.22 M/UL (ref 3.8–5.2)
SODIUM SERPL-SCNC: 139 MMOL/L (ref 136–145)
TRIGL SERPL-MCNC: 116 MG/DL (ref ?–150)
TSH SERPL DL<=0.05 MIU/L-ACNC: 2.98 UIU/ML (ref 0.36–3.74)
VLDLC SERPL CALC-MCNC: 23.2 MG/DL
WBC # BLD AUTO: 5.8 K/UL (ref 3.6–11)

## 2021-08-02 ENCOUNTER — DOCUMENTATION ONLY (OUTPATIENT)
Dept: INTERNAL MEDICINE CLINIC | Age: 76
End: 2021-08-02

## 2021-08-24 NOTE — PROGRESS NOTES
[facility] @ [address], [place]  Subjective/HPI:     Cleve Carrillo is a 67 y.o. female with history of hypertension, renal artery stenosis stented in 2015, hyperlipidemia, carotid stenosis and CKD followed by Dr. Stephanie Sloan is here for routine f/u. The patient denies chest pain/ shortness of breath, orthopnea, PND, LE edema, palpitations, syncope, presyncope or fatigue. Patient is active caring for her . She has had both Covid vaccines. Tolerating medications well without side effects. 2/5/2021 Virtual Visit  1. Hypertension: Dilated LV 8/2020, self reporting blood pressure 120/70 continue current therapy  2. History renal artery stenosis: Stented 2015 Dr. Orin Castillo, CTA 9/2019 stents patent.   3. High Cholesterol: On Crestor labs per Dr. Isabel Burris Goal LDL <100  4. Right carotid stenosis: Duplex 8/2020 < 50% bilaterally  5. CKD: Creatinine 1.54, GFR down 38. Dr Stephanie Sloan following      Stable from cardiac perspective follow-up in 6 months  ECHO 8/2020  Interpretation Summary     · LV: Estimated LVEF is 55 - 60%. Visually measured ejection fraction. Normal wall thickness and systolic function (ejection fraction normal). Dilated left ventricle. · LA: Moderately dilated left atrium. · AV: Mild aortic valve regurgitation is present. · MV: Mitral valve thickening. Mild mitral valve regurgitation is present. · PA: Pulmonary arterial systolic pressure is 35 mmHg.         8/2020 Carotid  Interpretation Summary     · There is mild stenosis in the right ICA (<50%). · There is mild stenosis in the left ICA (<50%). · The right vertebral is antegrade. · The left vertebral is antegrade      Nuclear Stress Test 8/2020  Gated SPECT images reviewed in 3 planes show appropriate LV systolic wall thickening. There is no segmental wall motion abnormality of the left ventricular myocardium. The calculated LV ejection fraction is 77%.  Pulmonary uptake and left ventricular cavity size appear normal.  IMPRESSION: No ischemia demonstrated. No infarct visible. LVEF 77%. PCP Provider  Jessica Chaparro MD  Past Medical History:   Diagnosis Date    Arthritis     hands    Asthma     Essential hypertension     GERD (gastroesophageal reflux disease)     High cholesterol     HTN (hypertension)     Ill-defined condition     hyperlipidemia    Left ventricular hypertrophy due to hypertensive disease     Renal artery stenosis ([address]     Shingles 08/11/2018      Past Surgical History:   Procedure Laterality Date    HX BREAST BIOPSY Left 20 years ago    negative    HX CATARACT REMOVAL  07/2018    right eye    HX GI      open inquinal hernia repair    HX GI      6/29/18:  pt reports mulitple abdominal surgeries but unable to recall exact types    HX GYN      tubal ligation    HX HEART CATHETERIZATION  2000    normal    HX UROLOGICAL      Bilateral Renal stents.      Allergies   Allergen Reactions    Amlodipine Other (comments)     Itching,  Muscle cramps    Clonidine Swelling    Ibuprofen Swelling    Levaquin [Levofloxacin] Unknown (comments)    Lisinopril Angioedema    Other Medication Rash and Itching     Patient reports she is allergic to the PPD test for TB    Pcn [Penicillins] Swelling    Pravastatin Myalgia    Sulfa (Sulfonamide Antibiotics) Hives      Family History   Problem Relation Age of Onset    Cancer Mother     Heart Disease Mother     Heart Disease Father     Hypertension Father     Lung Disease Father     Hypertension Brother     Diabetes Brother     Heart Disease Brother     Kidney Disease Brother     Hypertension Brother       Current Outpatient Medications   Medication Sig    rosuvastatin (CRESTOR) 5 mg tablet TAKE 1 TABLET BY MOUTH DAILY    furosemide (LASIX) 20 mg tablet TAKE 1 TABLET BY MOUTH DAILY (Patient taking differently: TAKE 1 TABLET BY MOUTH every other day)    beclomethasone (Qvar) 80 mcg/actuation aero INHALE 1 PUFF BY MOUTH ONCE DAILY AS NEEDED    NIFEdipine ER (ADALAT CC) 60 mg ER tablet TAKE 1 TABLET BY MOUTH EVERY DAY    potassium chloride SR (KLOR-CON 10) 10 mEq tablet TAKE 1 TABLET BY MOUTH TWICE DAILY    Biotin 2,500 mcg cap Take  by mouth.  ubidecarenone/vitamin E mixed (COQ10  PO) Take  by mouth.  cholecalciferol (VITAMIN D3) 1,000 unit tablet Take  by mouth daily.  acetaminophen (TYLENOL) 500 mg tablet Take 500 mg by mouth every six (6) hours as needed for Pain.  aspirin delayed-release 81 mg tablet Take 81 mg by mouth daily. Indications: PT states stopping 7/22-7/26 for Eye Surgery     No current facility-administered medications for this visit. There were no vitals filed for this visit. Social History     Socioeconomic History    Marital status:      Spouse name: Not on file    Number of children: Not on file    Years of education: Not on file    Highest education level: Not on file   Occupational History    Not on file   Tobacco Use    Smoking status: Never Smoker    Smokeless tobacco: Never Used   Substance and Sexual Activity    Alcohol use: No     Comment: Drinks Lite Beer    Drug use: Yes     Types: Prescription, OTC     Comment: perocet    Sexual activity: Not Currently     Partners: Male   Other Topics Concern    Not on file   Social History Narrative    Not on file     Social Determinants of Health     Financial Resource Strain:     Difficulty of Paying Living Expenses:    Food Insecurity:     Worried About Running Out of Food in the Last Year:     [address] in the Last Year:    Transportation Needs:     Lack of Transportation (Medical):      Lack of Transportation (Non-Medical):    Physical Activity:     Days of Exercise per Week:     Minutes of Exercise per Session:    Stress:     Feeling of Stress :    Social Connections:     Frequency of Communication with Friends and Family:     Frequency of Social Gatherings with Friends and Family:     Attends Taoism Services:     Active Member of Clubs or Organizations:     Attends Club or Organization Meetings:     Marital Status:    Intimate Partner Violence:     Fear of Current or Ex-Partner:     Emotionally Abused:     Physically Abused:     Sexually Abused:        I have reviewed the nurses notes, vitals, problem list, allergy list, medical history, family, social history and medications. Review of Symptoms  11 systems reviewed, negative other than as stated in the HPI      Physical Exam:      General: Well developed, in no acute distress, cooperative and alert  HEENT: No carotid bruits, no JVD, trach is midline. Neck Supple, PERRL, EOM intact. Heart:  Normal S1/S2 negative S3 or S4. Regular, no murmur, gallop or rub. Respiratory: Clear bilaterally x 4, no wheezing or rales  Abdomen:   Soft, non-tender, no masses, bowel sounds are active. Extremities:  No edema, normal cap refill, no cyanosis, atraumatic. Neuro: A&Ox3, speech clear, gait stable. Skin: Skin color is normal. No rashes or lesions.  Non diaphoretic  Vascular: 2+ pulses symmetric in all extremities    Cardiographics    ECG: Sinus rhythm        Cardiology Labs:  Lab Results   Component Value Date/Time    Cholesterol, total 213 (H) 07/22/2021 02:18 PM    HDL Cholesterol 84 07/22/2021 02:18 PM    LDL, calculated 105.8 (H) 07/22/2021 02:18 PM    Triglyceride 116 07/22/2021 02:18 PM    CHOL/HDL Ratio 2.5 07/22/2021 02:18 PM       Lab Results   Component Value Date/Time    Sodium 139 07/22/2021 02:18 PM    Potassium 3.9 07/22/2021 02:18 PM    Chloride 105 07/22/2021 02:18 PM    CO2 27 07/22/2021 02:18 PM    Anion gap 7 07/22/2021 02:18 PM    Glucose 98 07/22/2021 02:18 PM    BUN 19 07/22/2021 02:18 PM    Creatinine 1.33 (H) 07/22/2021 02:18 PM    BUN/Creatinine ratio 14 07/22/2021 02:18 PM    GFR est AA 47 (L) 07/22/2021 02:18 PM    GFR est non-AA 39 (L) 07/22/2021 02:18 PM    Calcium 9.8 07/22/2021 02:18 PM    Bilirubin, total 0.5 07/22/2021 02:18 PM    Alk. phosphatase 374 (H) 07/22/2021 02:18 PM    Protein, total 8.0 07/22/2021 02:18 PM    Albumin 4.2 07/22/2021 02:18 PM    Globulin 3.8 07/22/2021 02:18 PM    A-G Ratio 1.1 07/22/2021 02:18 PM    ALT (SGPT) 23 07/22/2021 02:18 PM           Assessment:     Assessment:     Diagnoses and all orders for this visit:    1. Hypertensive left ventricular hypertrophy, without heart failure    2. Essential hypertension, malignant    3. Stage 3 chronic kidney disease, unspecified whether stage 3a or 3b CKD ([facility] [address]    4. Edema of both legs--chronic venous insufficiency,amlodipine    5. Bilateral renal artery stenosis (HCC)--s/p PTA/stenting 7/10/15 [facility]    6. Bilateral carotid artery stenosis        ICD-10-CM ICD-9-CM    1. Hypertensive left ventricular hypertrophy, without heart failure  I11.9 402.90    2. Essential hypertension, malignant  I10 401.0    3. Stage 3 chronic kidney disease, unspecified whether stage 3a or 3b CKD (HCC)  N18.30 585.3    4. Edema of both legs--chronic venous insufficiency,amlodipine  R60.0 782.3    5. Bilateral renal artery stenosis (HCC)--s/p PTA/stenting 7/10/15 [facility]  I70.1 440.1    6. Bilateral carotid artery stenosis  I65.23 433.10      433.30      No orders of the defined types were placed in this encounter. Plan:        1. Hypertension: Dilated LV 8/2020,  presents normotensive 119/73  2. History renal artery stenosis: Stented 2015 Dr. Leslie Werner, CTA 9/2019 stents patent.   3. High Cholesterol: On Crestor labs per Dr. Raghav Moon 7/21    4. Right carotid stenosis: Duplex 8/2020 < 50% bilaterally  5. CKD: Creatinine 1.3 GFR 47 7/21 Dr Govind Cash following      Stable from cardiac perspective follow-up in 6 months patient has received both Covid vaccines. Congratulated on weight loss. Paresh Ames NP      Please note that this dictation was completed with [employer], the [employer] voice recognition software.   Quite often unanticipated grammatical, syntax, homophones, and other interpretive errors are inadvertently transcribed by the computer software. Please disregard these errors. Please excuse any errors that have escaped final proofreading. Thank you.

## 2021-08-25 ENCOUNTER — OFFICE VISIT (OUTPATIENT)
Dept: CARDIOLOGY CLINIC | Age: 76
End: 2021-08-25
Payer: MEDICARE

## 2021-08-25 VITALS
HEART RATE: 72 BPM | RESPIRATION RATE: 18 BRPM | HEIGHT: 66 IN | BODY MASS INDEX: 28.12 KG/M2 | WEIGHT: 175 LBS | OXYGEN SATURATION: 100 % | DIASTOLIC BLOOD PRESSURE: 73 MMHG | SYSTOLIC BLOOD PRESSURE: 119 MMHG

## 2021-08-25 DIAGNOSIS — I65.23 BILATERAL CAROTID ARTERY STENOSIS: ICD-10-CM

## 2021-08-25 DIAGNOSIS — I10 ESSENTIAL HYPERTENSION, MALIGNANT: ICD-10-CM

## 2021-08-25 DIAGNOSIS — I70.1 BILATERAL RENAL ARTERY STENOSIS (HCC): ICD-10-CM

## 2021-08-25 DIAGNOSIS — R60.0 EDEMA OF BOTH LEGS: ICD-10-CM

## 2021-08-25 DIAGNOSIS — I11.9 HYPERTENSIVE LEFT VENTRICULAR HYPERTROPHY, WITHOUT HEART FAILURE: Primary | ICD-10-CM

## 2021-08-25 DIAGNOSIS — N18.30 STAGE 3 CHRONIC KIDNEY DISEASE, UNSPECIFIED WHETHER STAGE 3A OR 3B CKD (HCC): ICD-10-CM

## 2021-08-25 PROCEDURE — 99214 OFFICE O/P EST MOD 30 MIN: CPT | Performed by: NURSE PRACTITIONER

## 2021-08-25 PROCEDURE — G8754 DIAS BP LESS 90: HCPCS | Performed by: NURSE PRACTITIONER

## 2021-08-25 PROCEDURE — 1090F PRES/ABSN URINE INCON ASSESS: CPT | Performed by: NURSE PRACTITIONER

## 2021-08-25 PROCEDURE — G8752 SYS BP LESS 140: HCPCS | Performed by: NURSE PRACTITIONER

## 2021-08-25 PROCEDURE — G8536 NO DOC ELDER MAL SCRN: HCPCS | Performed by: NURSE PRACTITIONER

## 2021-08-25 PROCEDURE — G8419 CALC BMI OUT NRM PARAM NOF/U: HCPCS | Performed by: NURSE PRACTITIONER

## 2021-08-25 PROCEDURE — 93000 ELECTROCARDIOGRAM COMPLETE: CPT | Performed by: NURSE PRACTITIONER

## 2021-08-25 PROCEDURE — G8399 PT W/DXA RESULTS DOCUMENT: HCPCS | Performed by: NURSE PRACTITIONER

## 2021-08-25 PROCEDURE — G8427 DOCREV CUR MEDS BY ELIG CLIN: HCPCS | Performed by: NURSE PRACTITIONER

## 2021-08-25 PROCEDURE — 1101F PT FALLS ASSESS-DOCD LE1/YR: CPT | Performed by: NURSE PRACTITIONER

## 2021-08-25 PROCEDURE — G8432 DEP SCR NOT DOC, RNG: HCPCS | Performed by: NURSE PRACTITIONER

## 2021-08-25 NOTE — PROGRESS NOTES
Becky Sinclair is a 67 y.o. female  Chief Complaint   Patient presents with    Follow-up     6 month     There are no preventive care reminders to display for this patient.   Visit Vitals  /73   Pulse 72   Resp 18   Ht 5' 6\" (1.676 m)   Wt 175 lb (79.4 kg)   SpO2 100%   BMI 28.25 kg/m²

## 2021-09-02 ENCOUNTER — HOSPITAL ENCOUNTER (OUTPATIENT)
Dept: VASCULAR SURGERY | Age: 76
Discharge: HOME | End: 2021-09-02
Attending: INTERNAL MEDICINE
Payer: MEDICARE

## 2021-09-02 ENCOUNTER — HOSPITAL ENCOUNTER (OUTPATIENT)
Dept: MAMMOGRAPHY | Age: 76
Discharge: HOME | End: 2021-09-02
Attending: INTERNAL MEDICINE
Payer: MEDICARE

## 2021-09-02 DIAGNOSIS — I65.23 CAROTID STENOSIS, ASYMPTOMATIC, BILATERAL: ICD-10-CM

## 2021-09-02 DIAGNOSIS — Z78.0 MENOPAUSE: ICD-10-CM

## 2021-09-02 PROCEDURE — 93880 EXTRACRANIAL BILAT STUDY: CPT

## 2021-09-02 PROCEDURE — 77080 DXA BONE DENSITY AXIAL: CPT

## 2021-09-02 NOTE — PROGRESS NOTES
Osteoporosis of forearms only --discussed fosamax. Hip osteopenia ,normal spine.  Pt declines medication, will repeat dexa in 2 years

## 2021-09-04 LAB
LEFT CCA DIST DIAS: 14.2 CM/S
LEFT CCA DIST SYS: 68.3 CM/S
LEFT CCA PROX DIAS: 14.2 CM/S
LEFT CCA PROX SYS: 90.4 CM/S
LEFT ECA DIAS: 6.9 CM/S
LEFT ECA SYS: 73.2 CM/S
LEFT ICA DIST DIAS: 17.9 CM/S
LEFT ICA DIST SYS: 69.5 CM/S
LEFT ICA MID DIAS: 16.7 CM/S
LEFT ICA MID SYS: 70.7 CM/S
LEFT ICA PROX DIAS: 11.8 CM/S
LEFT ICA PROX SYS: 49.9 CM/S
LEFT ICA/CCA SYS: 1
LEFT SUBCLAVIAN DIAS: 0 CM/S
LEFT SUBCLAVIAN SYS: 150.5 CM/S
LEFT VERTEBRAL DIAS: 14.2 CM/S
LEFT VERTEBRAL SYS: 51.1 CM/S
RIGHT CCA DIST DIAS: 14.6 CM/S
RIGHT CCA DIST SYS: 76 CM/S
RIGHT CCA PROX DIAS: 12.2 CM/S
RIGHT CCA PROX SYS: 76 CM/S
RIGHT ECA DIAS: 6 CM/S
RIGHT ECA SYS: 84.6 CM/S
RIGHT ICA DIST DIAS: 14.6 CM/S
RIGHT ICA DIST SYS: 55.1 CM/S
RIGHT ICA MID DIAS: 13.4 CM/S
RIGHT ICA MID SYS: 62.5 CM/S
RIGHT ICA PROX DIAS: 14.6 CM/S
RIGHT ICA PROX SYS: 76 CM/S
RIGHT ICA/CCA SYS: 1
RIGHT SUBCLAVIAN DIAS: 0 CM/S
RIGHT SUBCLAVIAN SYS: 131.4 CM/S
RIGHT VERTEBRAL DIAS: 8.5 CM/S
RIGHT VERTEBRAL SYS: 30.6 CM/S

## 2021-09-06 ENCOUNTER — DOCUMENTATION ONLY (OUTPATIENT)
Dept: INTERNAL MEDICINE CLINIC | Age: 76
End: 2021-09-06

## 2021-09-06 DIAGNOSIS — S22.000A COMPRESSION FRACTURE OF BODY OF THORACIC VERTEBRA (HCC): Primary | ICD-10-CM

## 2021-09-10 ENCOUNTER — HOSPITAL ENCOUNTER (OUTPATIENT)
Dept: GENERAL RADIOLOGY | Age: 76
Discharge: HOME | End: 2021-09-10
Payer: MEDICARE

## 2021-09-10 DIAGNOSIS — S22.000A COMPRESSION FRACTURE OF BODY OF THORACIC VERTEBRA (HCC): ICD-10-CM

## 2021-09-10 PROCEDURE — 72072 X-RAY EXAM THORAC SPINE 3VWS: CPT

## 2021-09-10 NOTE — PROGRESS NOTES
Called, spoke with Pt. Two pt identifiers confirmed. Pt informed of xr results per Dr. Anne Ratliff. Pt verbalized understanding of information discussed w/ no further questions at this time.

## 2021-09-10 NOTE — PROGRESS NOTES
Tell pt his T spine does not have any compression fxs , just had arthritis of the back.  The dexa scan suggested fx but she does not have fx's

## 2021-10-15 ENCOUNTER — TELEPHONE (OUTPATIENT)
Dept: CARDIOLOGY CLINIC | Age: 76
End: 2021-10-15

## 2021-10-15 NOTE — TELEPHONE ENCOUNTER
I called and spoke with the patient. Patient's two identifiers verified. Patient stated she was advised by her Nephrology Specialists, Valery Abernathy to see Cardiologist because of a low HR. Patient does not remember how low it was during her visit. And was advised to go to the ED if with worsening symptoms. Accdg to patient, she is doing fine today. Reported no chest pain. Scheduled ff up visit on 10/18/21.

## 2021-10-15 NOTE — TELEPHONE ENCOUNTER
CORINA. ...... Patient has an appt scheduled for Monday 10-, was advised by PCP to see her cardiologist due to low heart beat, really tired, little sob, or go to the ER if she gets worse.         Thanks

## 2021-10-17 NOTE — PROGRESS NOTES
[facility] @ [address], [place]  Subjective/HPI:     Hardy Ingram is a 67 y.o. female history of hypertension, renal artery stenosis stented in 2015, hyperlipidemia, carotid stenosis and CKD followed by Dr. Vani Cisneros is here for symptom based appt reporting low heart rate at Dr. Kelly Andrew office. Patient reports having intermittent dizziness feeling off balance when walking. Reviewed last year carotid duplex negative for critical carotid stenosis. She is not on any rate controlling medications denies syncope or presyncopal feelings      8/21 Visit  1. Hypertension: Dilated LV 8/2020,  presents normotensive 119/73  2. History renal artery stenosis: Stented 2015 Dr. Edgardo Plasencia, CTA 9/2019 stents patent.   3. High Cholesterol: On Crestor labs per Dr. Annika Murillo 7/21    4. Right carotid stenosis: Duplex 8/2020 < 50% bilaterally  5. CKD: Creatinine 1.3 GFR 47 7/21 Dr Beck following   ECHO 8/2020  Interpretation Summary     · LV: Estimated LVEF is 55 - 60%. Visually measured ejection fraction. Normal wall thickness and systolic function (ejection fraction normal). Dilated left ventricle. · LA: Moderately dilated left atrium. · AV: Mild aortic valve regurgitation is present. · MV: Mitral valve thickening. Mild mitral valve regurgitation is present. · PA: Pulmonary arterial systolic pressure is 35 mmHg.         8/2020 Carotid  Interpretation Summary     · There is mild stenosis in the right ICA (<50%). · There is mild stenosis in the left ICA (<50%). · The right vertebral is antegrade. · The left vertebral is antegrade      Nuclear Stress Test 8/2020  Gated SPECT images reviewed in 3 planes show appropriate LV systolic wall thickening. There is no segmental wall motion abnormality of the left ventricular myocardium. The calculated LV ejection fraction is 77%. Pulmonary uptake and left ventricular cavity size appear normal.  IMPRESSION: No ischemia demonstrated. No infarct visible. LVEF 77%. PCP Provider  Toro Viramontes MD  Past Medical History:   Diagnosis Date    Arthritis     hands    Asthma     Essential hypertension     GERD (gastroesophageal reflux disease)     High cholesterol     HTN (hypertension)     Ill-defined condition     hyperlipidemia    Left ventricular hypertrophy due to hypertensive disease     Renal artery stenosis ([address]     Shingles 08/11/2018      Past Surgical History:   Procedure Laterality Date    HX BREAST BIOPSY Left 20 years ago    negative    HX CATARACT REMOVAL  07/2018    right eye    HX GI      open inquinal hernia repair    HX GI      6/29/18:  pt reports mulitple abdominal surgeries but unable to recall exact types    HX GYN      tubal ligation    HX HEART CATHETERIZATION  2000    normal    HX UROLOGICAL      Bilateral Renal stents.      Allergies   Allergen Reactions    Amlodipine Other (comments)     Itching,  Muscle cramps    Clonidine Swelling    Ibuprofen Swelling    Levaquin [Levofloxacin] Unknown (comments)    Lisinopril Angioedema    Other Medication Rash and Itching     Patient reports she is allergic to the PPD test for TB    Pcn [Penicillins] Swelling    Pravastatin Myalgia    Sulfa (Sulfonamide Antibiotics) Hives      Family History   Problem Relation Age of Onset    Cancer Mother     Heart Disease Mother     Heart Disease Father     Hypertension Father     Lung Disease Father     Hypertension Brother     Diabetes Brother     Heart Disease Brother     Kidney Disease Brother     Hypertension Brother       Current Outpatient Medications   Medication Sig    rosuvastatin (CRESTOR) 5 mg tablet TAKE 1 TABLET BY MOUTH DAILY    furosemide (LASIX) 20 mg tablet TAKE 1 TABLET BY MOUTH DAILY (Patient taking differently: TAKE 1 TABLET BY MOUTH every other day)    beclomethasone (Qvar) 80 mcg/actuation aero INHALE 1 PUFF BY MOUTH ONCE DAILY AS NEEDED    NIFEdipine ER (ADALAT CC) 60 mg ER tablet TAKE 1 TABLET BY MOUTH EVERY DAY    potassium chloride SR (KLOR-CON 10) 10 mEq tablet TAKE 1 TABLET BY MOUTH TWICE DAILY    Biotin 2,500 mcg cap Take  by mouth.  ubidecarenone/vitamin E mixed (COQ10  PO) Take  by mouth.  cholecalciferol (VITAMIN D3) 1,000 unit tablet Take  by mouth daily.  acetaminophen (TYLENOL) 500 mg tablet Take 500 mg by mouth every six (6) hours as needed for Pain.  aspirin delayed-release 81 mg tablet Take 81 mg by mouth daily. Indications: PT states stopping 7/22-7/26 for Eye Surgery     No current facility-administered medications for this visit. There were no vitals filed for this visit. Social History     Socioeconomic History    Marital status:      Spouse name: Not on file    Number of children: Not on file    Years of education: Not on file    Highest education level: Not on file   Occupational History    Not on file   Tobacco Use    Smoking status: Never Smoker    Smokeless tobacco: Never Used   Substance and Sexual Activity    Alcohol use: No     Comment: Drinks Lite Beer    Drug use: Yes     Types: Prescription, OTC     Comment: perocet    Sexual activity: Not Currently     Partners: Male   Other Topics Concern    Not on file   Social History Narrative    Not on file     Social Determinants of Health     Financial Resource Strain:     Difficulty of Paying Living Expenses:    Food Insecurity:     Worried About Running Out of Food in the Last Year:     [address] in the Last Year:    Transportation Needs:     Lack of Transportation (Medical):      Lack of Transportation (Non-Medical):    Physical Activity:     Days of Exercise per Week:     Minutes of Exercise per Session:    Stress:     Feeling of Stress :    Social Connections:     Frequency of Communication with Friends and Family:     Frequency of Social Gatherings with Friends and Family:     Attends Gnosticism Services:     Active Member of Clubs or Organizations:     Attends Club or Organization Meetings:     Marital Status:    Intimate Partner Violence:     Fear of Current or Ex-Partner:     Emotionally Abused:     Physically Abused:     Sexually Abused:        I have reviewed the nurses notes, vitals, problem list, allergy list, medical history, family, social history and medications. Review of Symptoms  11 systems reviewed, negative other than as stated in the HPI      Physical Exam:      General: Well developed, in no acute distress, cooperative and alert  HEENT: No carotid bruits, no JVD, trach is midline. Neck Supple, PERRL, EOM intact. Heart:  Normal S1/S2 negative S3 or S4. Regular, no murmur, gallop or rub. Respiratory: Clear bilaterally x 4, no wheezing or rales  Abdomen:   Soft, non-tender, no masses, bowel sounds are active. Extremities:  No edema, normal cap refill, no cyanosis, atraumatic. Neuro: A&Ox3, speech clear, gait stable. Skin: Skin color is normal. No rashes or lesions. Non diaphoretic  Vascular: 2+ pulses symmetric in all extremities    Cardiographics    ECG: Sinus bradycardia        Cardiology Labs:  Lab Results   Component Value Date/Time    Cholesterol, total 213 (H) 07/22/2021 02:18 PM    HDL Cholesterol 84 07/22/2021 02:18 PM    LDL, calculated 105.8 (H) 07/22/2021 02:18 PM    Triglyceride 116 07/22/2021 02:18 PM    CHOL/HDL Ratio 2.5 07/22/2021 02:18 PM       Lab Results   Component Value Date/Time    Sodium 139 07/22/2021 02:18 PM    Potassium 3.9 07/22/2021 02:18 PM    Chloride 105 07/22/2021 02:18 PM    CO2 27 07/22/2021 02:18 PM    Anion gap 7 07/22/2021 02:18 PM    Glucose 98 07/22/2021 02:18 PM    BUN 19 07/22/2021 02:18 PM    Creatinine 1.33 (H) 07/22/2021 02:18 PM    BUN/Creatinine ratio 14 07/22/2021 02:18 PM    GFR est AA 47 (L) 07/22/2021 02:18 PM    GFR est non-AA 39 (L) 07/22/2021 02:18 PM    Calcium 9.8 07/22/2021 02:18 PM    Bilirubin, total 0.5 07/22/2021 02:18 PM    Alk.  phosphatase 374 (H) 07/22/2021 02:18 PM    Protein, total 8.0 07/22/2021 02:18 PM    Albumin 4.2 07/22/2021 02:18 PM    Globulin 3.8 07/22/2021 02:18 PM    A-G Ratio 1.1 07/22/2021 02:18 PM    ALT (SGPT) 23 07/22/2021 02:18 PM           Assessment:     Assessment:     Diagnoses and all orders for this visit:    1. Stage 3 chronic kidney disease, unspecified whether stage 3a or 3b CKD ([facility] [address]    2. High cholesterol        ICD-10-CM ICD-9-CM    1. Stage 3 chronic kidney disease, unspecified whether stage 3a or 3b CKD (HCC)  N18.30 585.3    2. High cholesterol  E78.00 272.0      No orders of the defined types were placed in this encounter. Plan:        1. Hypertension: Dilated LV 8/2020, mildly hypertensive today 148/76 reporting increased stress. 2. History renal artery stenosis: Stented 2015 Dr. Yoanna Galan, CTA 9/2019 stents patent.   3. High Cholesterol: On Crestor labs per Dr. Zoya Koenig 7/21    4. Right carotid stenosis: Duplex 8/2020 < 50% bilaterally  5. CKD: Creatinine 1.3 GFR 47 7/21 Dr Kiran pena   6.  Bradycardia with reported dizziness: Not on any rate controlling medications, heart rate per patient was determined by pulse ox and automatic blood pressure machine. She is sinus rhythm today rate in the 60s. Will apply 5-day Holter monitor to rule out sick sinus syndrome. Follow-up in 6 months unless abnormal monitor. Marina Gutierrez NP      Please note that this dictation was completed with [employer] voice recognition software. Quite often unanticipated grammatical, syntax, homophones, and other interpretive errors are inadvertently transcribed by the computer software. Please disregard these errors. Please excuse any errors that have escaped final proofreading. Thank you.

## 2021-10-18 ENCOUNTER — HOSPITAL ENCOUNTER (OUTPATIENT)
Dept: NON INVASIVE DIAGNOSTICS | Age: 76
Discharge: HOME | End: 2021-10-18
Payer: MEDICARE

## 2021-10-18 ENCOUNTER — OFFICE VISIT (OUTPATIENT)
Dept: CARDIOLOGY CLINIC | Age: 76
End: 2021-10-18
Payer: MEDICARE

## 2021-10-18 VITALS
HEART RATE: 68 BPM | SYSTOLIC BLOOD PRESSURE: 148 MMHG | RESPIRATION RATE: 19 BRPM | WEIGHT: 176.6 LBS | BODY MASS INDEX: 28.38 KG/M2 | DIASTOLIC BLOOD PRESSURE: 76 MMHG | OXYGEN SATURATION: 98 % | HEIGHT: 66 IN

## 2021-10-18 DIAGNOSIS — E78.00 HIGH CHOLESTEROL: ICD-10-CM

## 2021-10-18 DIAGNOSIS — R42 DIZZINESS: ICD-10-CM

## 2021-10-18 DIAGNOSIS — N18.30 STAGE 3 CHRONIC KIDNEY DISEASE, UNSPECIFIED WHETHER STAGE 3A OR 3B CKD (HCC): ICD-10-CM

## 2021-10-18 DIAGNOSIS — R00.1 BRADYCARDIA: Primary | ICD-10-CM

## 2021-10-18 DIAGNOSIS — R00.1 BRADYCARDIA: ICD-10-CM

## 2021-10-18 PROCEDURE — G8754 DIAS BP LESS 90: HCPCS | Performed by: NURSE PRACTITIONER

## 2021-10-18 PROCEDURE — G8536 NO DOC ELDER MAL SCRN: HCPCS | Performed by: NURSE PRACTITIONER

## 2021-10-18 PROCEDURE — G8427 DOCREV CUR MEDS BY ELIG CLIN: HCPCS | Performed by: NURSE PRACTITIONER

## 2021-10-18 PROCEDURE — 93000 ELECTROCARDIOGRAM COMPLETE: CPT | Performed by: NURSE PRACTITIONER

## 2021-10-18 PROCEDURE — 93225 XTRNL ECG REC<48 HRS REC: CPT

## 2021-10-18 PROCEDURE — 99214 OFFICE O/P EST MOD 30 MIN: CPT | Performed by: NURSE PRACTITIONER

## 2021-10-18 PROCEDURE — G8399 PT W/DXA RESULTS DOCUMENT: HCPCS | Performed by: NURSE PRACTITIONER

## 2021-10-18 PROCEDURE — 1090F PRES/ABSN URINE INCON ASSESS: CPT | Performed by: NURSE PRACTITIONER

## 2021-10-18 PROCEDURE — G8432 DEP SCR NOT DOC, RNG: HCPCS | Performed by: NURSE PRACTITIONER

## 2021-10-18 PROCEDURE — G8419 CALC BMI OUT NRM PARAM NOF/U: HCPCS | Performed by: NURSE PRACTITIONER

## 2021-10-18 PROCEDURE — 1101F PT FALLS ASSESS-DOCD LE1/YR: CPT | Performed by: NURSE PRACTITIONER

## 2021-10-18 PROCEDURE — G8753 SYS BP > OR = 140: HCPCS | Performed by: NURSE PRACTITIONER

## 2021-10-18 NOTE — PROGRESS NOTES
5 day e patch placed after explanation to the patient Monitor number  [id number]. Pt to return on Friday after 1100am. If life threatening event call 911 and go to nearest er. Transported pt to [facility] per .

## 2021-10-18 NOTE — PROGRESS NOTES
1. Have you been to the ER, urgent care clinic since your last visit? Hospitalized since your last visit? No    2. Have you seen or consulted any other health care providers outside of the [address] [place] [address] since your last visit? Include any pap smears or colon screening. No    Chief Complaint   Patient presents with    Follow-up     Low HR per Nephrologists, Dr. Jacolyn Canavan.  Shortness of Breath     with exertion.

## 2021-10-26 ENCOUNTER — TELEPHONE (OUTPATIENT)
Dept: CARDIOLOGY CLINIC | Age: 76
End: 2021-10-26

## 2021-10-26 DIAGNOSIS — R06.09 DOE (DYSPNEA ON EXERTION): ICD-10-CM

## 2021-10-26 DIAGNOSIS — R42 DIZZINESS: ICD-10-CM

## 2021-10-26 DIAGNOSIS — I49.3 FREQUENT PVCS: Primary | ICD-10-CM

## 2021-10-26 PROCEDURE — 93227 XTRNL ECG REC<48 HR R&I: CPT | Performed by: SPECIALIST

## 2021-10-26 NOTE — TELEPHONE ENCOUNTER
I called and spoke with the patient. Patient's two identifiers verified. I discussed this in detail with patient.    ----- Message from Rafael Workman NP sent at 10/26/2021  3:16 PM EDT -----  Please call patient, Holter monitor shows average heart rate 62 bpm.  The lowest it was was 38 bpm while she was sleeping which is acceptable. She has a large amount of premature ventricular contractions which is abnormal.  This can make her heart rate read falsely low on a pulse ox or automatic blood pressure cuff. I would like to order an echocardiogram to recheck the pumping strength of her heart to make sure these premature contractions have not caused any weakness as well as have her see Dr. Brennan Luo for electrophysiology consult to determine appropriate treatment for these frequent premature ventricular contractions including an ablation. I will be putting in the order for the echo and EP consult      Patient verbalized understanding.

## 2021-10-26 NOTE — PROGRESS NOTES
Please call patient, Holter monitor shows average heart rate 62 bpm.  The lowest it was was 38 bpm while she was sleeping which is acceptable. She has a large amount of premature ventricular contractions which is abnormal.  This can make her heart rate read falsely low on a pulse ox or automatic blood pressure cuff. I would like to order an echocardiogram to recheck the pumping strength of her heart to make sure these premature contractions have not caused any weakness as well as have her see Dr. Isabelle Johnston for electrophysiology consult to determine appropriate treatment for these frequent premature ventricular contractions including an ablation.   I will be putting in the order for the echo and EP consult

## 2021-11-08 ENCOUNTER — HOSPITAL ENCOUNTER (OUTPATIENT)
Dept: NON INVASIVE DIAGNOSTICS | Age: 76
Discharge: HOME | End: 2021-11-08
Attending: NURSE PRACTITIONER
Payer: MEDICARE

## 2021-11-08 VITALS
DIASTOLIC BLOOD PRESSURE: 76 MMHG | BODY MASS INDEX: 28.38 KG/M2 | WEIGHT: 176.59 LBS | HEIGHT: 66 IN | SYSTOLIC BLOOD PRESSURE: 148 MMHG

## 2021-11-08 DIAGNOSIS — I49.3 FREQUENT PVCS: ICD-10-CM

## 2021-11-08 DIAGNOSIS — R06.09 DOE (DYSPNEA ON EXERTION): ICD-10-CM

## 2021-11-08 DIAGNOSIS — R42 DIZZINESS: ICD-10-CM

## 2021-11-08 LAB
AV R PG: 39.49 MMHG
ECHO AO ROOT DIAM: 2.78 CM
ECHO AR MAX VEL PISA: 314.18 CM/S
ECHO AV AREA PEAK VELOCITY: 2.51 CM2
ECHO AV AREA/BSA PEAK VELOCITY: 1.3 CM2/M2
ECHO AV MEAN GRADIENT: 3.42 MMHG
ECHO AV PEAK GRADIENT: 6.4 MMHG
ECHO AV PEAK VELOCITY: 125.94 CM/S
ECHO AV REGURGITANT PHT: 745.54 MS
ECHO AV VTI: 29.45 CM
ECHO LA AREA 4C: 16.6 CM2
ECHO LA MAJOR AXIS: 3.95 CM
ECHO LA MINOR AXIS: 2.08 CM
ECHO LA VOL 4C: 47.69 ML (ref 22–52)
ECHO LA VOLUME INDEX A4C: 25.1 ML/M2 (ref 16–28)
ECHO LV E' LATERAL VELOCITY: 11.17 CM/S
ECHO LV E' SEPTAL VELOCITY: 6.37 CM/S
ECHO LV EDV A4C: 62.35 ML
ECHO LV EDV INDEX A4C: 32.8 ML/M2
ECHO LV EJECTION FRACTION A4C: 65 PERCENT
ECHO LV ESV A4C: 22.02 ML
ECHO LV ESV INDEX A4C: 11.6 ML/M2
ECHO LV INTERNAL DIMENSION DIASTOLIC: 4.54 CM (ref 3.9–5.3)
ECHO LV INTERNAL DIMENSION SYSTOLIC: 3.31 CM
ECHO LV IVSD: 1.23 CM (ref 0.6–0.9)
ECHO LV MASS 2D: 184.4 G (ref 67–162)
ECHO LV MASS INDEX 2D: 97 G/M2 (ref 43–95)
ECHO LV POSTERIOR WALL DIASTOLIC: 1.03 CM (ref 0.6–0.9)
ECHO LVOT DIAM: 1.84 CM
ECHO LVOT PEAK GRADIENT: 5.67 MMHG
ECHO LVOT PEAK VELOCITY: 119.05 CM/S
ECHO MV A VELOCITY: 100.41 CM/S
ECHO MV E DECELERATION TIME (DT): 172.18 MS
ECHO MV E VELOCITY: 125.35 CM/S
ECHO MV E/A RATIO: 1.25
ECHO MV E/E' LATERAL: 11.22
ECHO MV E/E' RATIO (AVERAGED): 15.45
ECHO MV E/E' SEPTAL: 19.68
ECHO MV MAX VELOCITY: 121.58 CM/S
ECHO MV MEAN GRADIENT: 2.5 MMHG
ECHO MV PEAK GRADIENT: 5.91 MMHG
ECHO MV VTI: 36.84 CM
ECHO PV MAX VELOCITY: 129.49 CM/S
ECHO PV PEAK INSTANTANEOUS GRADIENT SYSTOLIC: 6.71 MMHG
ECHO TV REGURGITANT MAX VELOCITY: 428.88 CM/S

## 2021-11-08 PROCEDURE — 93306 TTE W/DOPPLER COMPLETE: CPT

## 2021-11-08 PROCEDURE — 93306 TTE W/DOPPLER COMPLETE: CPT | Performed by: INTERNAL MEDICINE

## 2021-11-09 ENCOUNTER — TELEPHONE (OUTPATIENT)
Dept: CARDIOLOGY CLINIC | Age: 76
End: 2021-11-09

## 2021-11-09 NOTE — TELEPHONE ENCOUNTER
I called and spoke with the patient. Patient's two identifiers verified. I discussed this in detail with the patient:  ----- Message from Genia Islas NP sent at 11/8/2021  8:47 PM EST -----  Please call patient, pumping strength of heart normal on ECHO. Small amount of valve leakage which is normal for her age. Overall normal ECHO       She verbalized understanding. No other concerns at this time.

## 2021-11-09 NOTE — PROGRESS NOTES
Please call patient, pumping strength of heart normal on ECHO. Small amount of valve leakage which is normal for her age.  Overall normal ECHO

## 2021-11-11 ENCOUNTER — OFFICE VISIT (OUTPATIENT)
Dept: CARDIOLOGY CLINIC | Age: 76
End: 2021-11-11
Payer: MEDICARE

## 2021-11-11 VITALS
OXYGEN SATURATION: 98 % | HEIGHT: 66 IN | DIASTOLIC BLOOD PRESSURE: 66 MMHG | HEART RATE: 65 BPM | WEIGHT: 177.6 LBS | RESPIRATION RATE: 18 BRPM | BODY MASS INDEX: 28.54 KG/M2 | SYSTOLIC BLOOD PRESSURE: 126 MMHG

## 2021-11-11 DIAGNOSIS — I44.1 AV BLOCK, MOBITZ 1: ICD-10-CM

## 2021-11-11 DIAGNOSIS — I10 ESSENTIAL HYPERTENSION, MALIGNANT: ICD-10-CM

## 2021-11-11 DIAGNOSIS — I49.3 FREQUENT PVCS: Primary | ICD-10-CM

## 2021-11-11 DIAGNOSIS — E78.2 MIXED HYPERLIPIDEMIA: ICD-10-CM

## 2021-11-11 DIAGNOSIS — E78.00 HIGH CHOLESTEROL: ICD-10-CM

## 2021-11-11 PROCEDURE — 93005 ELECTROCARDIOGRAM TRACING: CPT | Performed by: INTERNAL MEDICINE

## 2021-11-11 PROCEDURE — G8399 PT W/DXA RESULTS DOCUMENT: HCPCS | Performed by: INTERNAL MEDICINE

## 2021-11-11 PROCEDURE — G8754 DIAS BP LESS 90: HCPCS | Performed by: INTERNAL MEDICINE

## 2021-11-11 PROCEDURE — 93010 ELECTROCARDIOGRAM REPORT: CPT | Performed by: INTERNAL MEDICINE

## 2021-11-11 PROCEDURE — G8536 NO DOC ELDER MAL SCRN: HCPCS | Performed by: INTERNAL MEDICINE

## 2021-11-11 PROCEDURE — G8432 DEP SCR NOT DOC, RNG: HCPCS | Performed by: INTERNAL MEDICINE

## 2021-11-11 PROCEDURE — G8419 CALC BMI OUT NRM PARAM NOF/U: HCPCS | Performed by: INTERNAL MEDICINE

## 2021-11-11 PROCEDURE — 1090F PRES/ABSN URINE INCON ASSESS: CPT | Performed by: INTERNAL MEDICINE

## 2021-11-11 PROCEDURE — G8752 SYS BP LESS 140: HCPCS | Performed by: INTERNAL MEDICINE

## 2021-11-11 PROCEDURE — G0463 HOSPITAL OUTPT CLINIC VISIT: HCPCS | Performed by: INTERNAL MEDICINE

## 2021-11-11 PROCEDURE — G8427 DOCREV CUR MEDS BY ELIG CLIN: HCPCS | Performed by: INTERNAL MEDICINE

## 2021-11-11 PROCEDURE — 99204 OFFICE O/P NEW MOD 45 MIN: CPT | Performed by: INTERNAL MEDICINE

## 2021-11-11 PROCEDURE — 1101F PT FALLS ASSESS-DOCD LE1/YR: CPT | Performed by: INTERNAL MEDICINE

## 2021-11-11 NOTE — LETTER
11/11/2021    Patient: Estuardo Colbert   YOB: 1945   Date of Visit: 11/11/2021     Olu Suarez MD  [address] [facility] [address]  [facility]  [facility] [facility] [address]    Dear Olu Suarez MD,      Thank you for referring Ms. Ganga Rooney to [facility] for evaluation. My notes for this consultation are attached. If you have questions, please do not hesitate to call me. I look forward to following your patient along with you.       Sincerely,    Sebastien Ballard MD

## 2021-11-11 NOTE — PROGRESS NOTES
Subjective:      Luis Carlos Roach is a 67 y.o. female is here for EP consult. The patient denies chest pain/ shortness of breath, orthopnea, PND, LE edema, palpitations, syncope, presyncope or fatigue.        Patient Active Problem List    Diagnosis Date Noted    Essential hypertension, malignant 04/26/2015    Early satiety 08/13/2019    CKD (chronic kidney disease) stage 3, GFR 30-59 ml/min ([address] 08/13/2019    Osteopenia of hip 04/22/2019    Vitamin D deficiency 04/12/2019    Syncope and collapse 09/30/2018    Syncope due to orthostatic hypotension 09/30/2018    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    Carotid bruit present 12/05/2017    Bilateral carotid artery stenosis 12/05/2017    History of tubal ligation 09/14/2015    Bilateral renal artery stenosis (HCC)--s/p PTA/stenting 7/10/15 [facility] 08/09/2015    Stress at home 06/13/2015    H/O gastroesophageal reflux (GERD) 03/12/2015    Obesity 09/04/2013    Edema of both legs--chronic venous insufficiency,amlodipine 09/04/2013    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 09/04/2013    High cholesterol     Left ventricular hypertrophy due to hypertensive disease       Tiago Jensen MD  Past Medical History:   Diagnosis Date    Arthritis     hands    Asthma     Essential hypertension     GERD (gastroesophageal reflux disease)     High cholesterol     HTN (hypertension)     Ill-defined condition     hyperlipidemia    Left ventricular hypertrophy due to hypertensive disease     Murmur     Renal artery stenosis ([address]     Shingles 08/11/2018    Syncope       Past Surgical History:   Procedure Laterality Date    HX BREAST BIOPSY Left 20 years ago    negative    HX CATARACT REMOVAL  07/2018    right eye    HX GI      open inquinal hernia repair    HX GI      6/29/18:  pt reports mulitple abdominal surgeries but unable to recall exact types    HX GYN      tubal ligation    HX HEART CATHETERIZATION  2000    normal    HX UROLOGICAL Bilateral Renal stents. Allergies   Allergen Reactions    Amlodipine Other (comments)     Itching,  Muscle cramps    Clonidine Swelling    Ibuprofen Swelling    Levaquin [Levofloxacin] Unknown (comments)    Lisinopril Angioedema    Other Medication Rash and Itching     Patient reports she is allergic to the PPD test for TB    Pcn [Penicillins] Swelling    Pravastatin Myalgia    Sulfa (Sulfonamide Antibiotics) Hives      Family History   Problem Relation Age of Onset    Cancer Mother     Heart Disease Mother     Heart Disease Father     Hypertension Father     Lung Disease Father     Hypertension Brother     Diabetes Brother     Heart Disease Brother     Kidney Disease Brother     Hypertension Brother     negative for cardiac disease  Social History     Socioeconomic History    Marital status:    Tobacco Use    Smoking status: Never Smoker    Smokeless tobacco: Never Used   Substance and Sexual Activity    Alcohol use: No     Comment: Drinks Lite Beer    Drug use: Yes     Types: Prescription, OTC     Comment: perocet    Sexual activity: Not Currently     Partners: Male     Current Outpatient Medications   Medication Sig    rosuvastatin (CRESTOR) 5 mg tablet TAKE 1 TABLET BY MOUTH DAILY    furosemide (LASIX) 20 mg tablet TAKE 1 TABLET BY MOUTH DAILY (Patient taking differently: Take 20 mg by mouth daily.)    beclomethasone (Qvar) 80 mcg/actuation aero INHALE 1 PUFF BY MOUTH ONCE DAILY AS NEEDED    NIFEdipine ER (ADALAT CC) 60 mg ER tablet TAKE 1 TABLET BY MOUTH EVERY DAY    potassium chloride SR (KLOR-CON 10) 10 mEq tablet TAKE 1 TABLET BY MOUTH TWICE DAILY    Biotin 2,500 mcg cap Take  by mouth.  ubidecarenone/vitamin E mixed (COQ10  PO) Take  by mouth daily.  cholecalciferol (VITAMIN D3) 1,000 unit tablet Take  by mouth daily.  acetaminophen (TYLENOL) 500 mg tablet Take 500 mg by mouth every six (6) hours as needed for Pain.     aspirin delayed-release 81 mg tablet Take 81 mg by mouth daily. Indications: PT states stopping 7/22-7/26 for Eye Surgery     No current facility-administered medications for this visit. Vitals:    11/11/21 0933   BP: 126/66   Pulse: 65   Resp: 18   SpO2: 98%   Weight: 177 lb 9.6 oz (80.6 kg)   Height: 5' 6\" (1.676 m)       I have reviewed the nurses notes, vitals, problem list, allergy list, medical history, family, social history and medications. Review of Symptoms:    General: Pt denies excessive weight gain or loss. Pt is able to conduct ADL's  HEENT: Denies blurred vision, headaches, hearing loss, epistaxis and difficulty swallowing. Respiratory: Denies cough, congestion, shortness of breath, EGAN, wheezing or stridor. Cardiovascular: Denies precordial pain, palpitations, edema or PND  Gastrointestinal: Denies poor appetite, indigestion, abdominal pain or blood in stool  Genitourinary: Denies hematuria, dysuria, increased urinary frequency  Musculoskeletal: Denies joint pain or swelling from muscles or joints  Neurologic: Denies tremor, paresthesias, headache, or sensory motor disturbance  Psychiatric: Denies confusion, insomnia, depression  Integumentray: Denies rash, itching or ulcers. Hematologic: Denies easy bruising, bleeding    Physical Exam:      General: Well developed, in no acute distress. HEENT: Eyes - PERRL, no jvd  Heart:  Normal S1/S2 negative S3 or S4. Regular, no murmur, gallop or rub. Respiratory: Clear bilaterally x 4, no wheezing or rales  Abdomen:   Soft, non-tender, bowel sounds are active. Extremities:  No edema, normal cap refill, no cyanosis. Musculoskeletal: No clubbing  Neuro: A&Ox3, speech clear, gait stable. Skin: Skin color is normal. No rashes or lesions.  Non diaphoretic, no ulcers or subcutaneous nodule  Vascular: 2+ pulses symmetric in all extremities  Psych - judgement intact and orientation is wnl     Cardiographics    Ekg: nsr    Echo - nl lvef    Monitor - episodes of 2:1 heart block overnight with pvc burden 9%    Results for orders placed or performed during the hospital encounter of 08/11/18   EKG, 12 LEAD, INITIAL   Result Value Ref Range    Ventricular Rate 72 BPM    Atrial Rate 72 BPM    P-R Interval 188 ms    QRS Duration 72 ms    Q-T Interval 390 ms    QTC Calculation (Bezet) 427 ms    Calculated P Axis 55 degrees    Calculated R Axis 11 degrees    Calculated T Axis 39 degrees    Diagnosis       Normal sinus rhythm  Nonspecific T wave abnormality  When compared with ECG of 25-APR-2015 22:53,  No significant change  Confirmed by Dahlia Lo ([id number]) on 8/12/2018 11:31:12 AM           Lab Results   Component Value Date/Time    WBC 5.8 07/22/2021 02:18 PM    HGB 13.1 07/22/2021 02:18 PM    HCT 39.6 07/22/2021 02:18 PM    PLATELET 290 37/02/4746 02:18 PM    MCV 93.8 07/22/2021 02:18 PM      Lab Results   Component Value Date/Time    Sodium 139 07/22/2021 02:18 PM    Potassium 3.9 07/22/2021 02:18 PM    Chloride 105 07/22/2021 02:18 PM    CO2 27 07/22/2021 02:18 PM    Anion gap 7 07/22/2021 02:18 PM    Glucose 98 07/22/2021 02:18 PM    BUN 19 07/22/2021 02:18 PM    Creatinine 1.33 (H) 07/22/2021 02:18 PM    BUN/Creatinine ratio 14 07/22/2021 02:18 PM    GFR est AA 47 (L) 07/22/2021 02:18 PM    GFR est non-AA 39 (L) 07/22/2021 02:18 PM    Calcium 9.8 07/22/2021 02:18 PM    Bilirubin, total 0.5 07/22/2021 02:18 PM    Alk. phosphatase 374 (H) 07/22/2021 02:18 PM    Protein, total 8.0 07/22/2021 02:18 PM    Albumin 4.2 07/22/2021 02:18 PM    Globulin 3.8 07/22/2021 02:18 PM    A-G Ratio 1.1 07/22/2021 02:18 PM    ALT (SGPT) 23 07/22/2021 02:18 PM         Assessment:     Assessment:        ICD-10-CM ICD-9-CM    1. Frequent PVCs  I49.3 427.69 AMB POC EKG ROUTINE W/ 12 LEADS, INTER & REP   2. Essential hypertension, malignant  I10 401.0    3. High cholesterol  E78.00 272.0    4. AV block, [address] 1  I44.1 426.13    5.  Mixed hyperlipidemia  E78.2 272.2      Orders Placed This Encounter    AMB POC EKG ROUTINE W/ 12 LEADS, INTER & REP     Order Specific Question:   Reason for Exam:     Answer:   routine        Plan:   Natasha Rodriguez is a pleasant lady referred for pvc burden 9% and episodes of 2:1 heart block. She denies dizziness or palpitations. We cannot treat the pvcs with av sophia agents secondary to the 2:1 heart block. She is not interest in any invasive procedures - discussed pvc ablation vs dc ppm/bb therapy. Her lvef is wnl. Would obtain a sleep study to r/o norma as a cause of her overnight heart block. She will f/u on a prn basis. Continue medical management for htn and hyperlipidemia. Thank you for allowing me to participate in Natasha Rodriguez 's care.     Venetta Spatz, MD, E[facility] Carrel

## 2022-03-18 PROBLEM — N18.30 CKD (CHRONIC KIDNEY DISEASE) STAGE 3, GFR 30-59 ML/MIN (HCC): Status: ACTIVE | Noted: 2019-08-13

## 2022-03-19 PROBLEM — R09.89 CAROTID BRUIT PRESENT: Status: ACTIVE | Noted: 2017-12-05

## 2022-03-19 PROBLEM — M88.9 PAGET'S BONE DISEASE: Status: ACTIVE | Noted: 2018-09-11

## 2022-03-19 PROBLEM — I65.23 BILATERAL CAROTID ARTERY STENOSIS: Status: ACTIVE | Noted: 2017-12-05

## 2022-03-19 PROBLEM — R68.81 EARLY SATIETY: Status: ACTIVE | Noted: 2019-08-13

## 2022-03-19 PROBLEM — M85.859 OSTEOPENIA OF HIP: Status: ACTIVE | Noted: 2019-04-22

## 2022-03-19 PROBLEM — R55 SYNCOPE AND COLLAPSE: Status: ACTIVE | Noted: 2018-09-30

## 2022-03-20 PROBLEM — I95.1 SYNCOPE DUE TO ORTHOSTATIC HYPOTENSION: Status: ACTIVE | Noted: 2018-09-30

## 2022-03-20 PROBLEM — E55.9 VITAMIN D DEFICIENCY: Status: ACTIVE | Noted: 2019-04-12

## 2022-04-04 ENCOUNTER — TELEPHONE (OUTPATIENT)
Dept: INTERNAL MEDICINE CLINIC | Age: 77
End: 2022-04-04

## 2022-04-04 ENCOUNTER — NURSE TRIAGE (OUTPATIENT)
Dept: OTHER | Facility: CLINIC | Age: 77
End: 2022-04-04

## 2022-04-04 NOTE — TELEPHONE ENCOUNTER
----- Message from Gibsonnandini Cushing sent at 4/4/2022 10:30 AM EDT -----  Subject: Appointment Request    Reason for Call: Semi-Urgent Return from RN Triage    QUESTIONS  Type of Appointment? Established Patient  Reason for appointment request? No appointments available during search  Additional Information for Provider? Patient talked with nurse triage and   they would like her seen within 3 days regarding pain in her arms and   legs. Please call patient back at [phone] or [phone]. She does not   know how to retrieve her voicemail.   ---------------------------------------------------------------------------  --------------  CALL BACK INFO  What is the best way for the office to contact you? Do not leave any   message, patient will call back for answer  Preferred Call Back Phone Number? [phone]  ---------------------------------------------------------------------------  --------------  SCRIPT ANSWERS  Patient needs to be seen today? No  Patient needs to be seen today or tomorrow? No  Patient needs to be seen within 3 days? Yes   Patient needs to be seen within 5 days? No  Patient can be seen for a routine visit? No  Nurse Name? Obdulio Olivera  Have you been diagnosed with, awaiting test results for, or told that you   are suspected of having COVID-19 (Coronavirus)? (If patient has tested   negative or was tested as a requirement for work, school, or travel and   not based on symptoms, answer no)? No  Within the past 10 days have you developed any of the following symptoms   (answer no if symptoms have been present longer than 10 days or began   more than 10 days ago)? Fever or Chills, Cough, Shortness of breath or   difficulty breathing, Loss of taste or smell, Sore throat, Nasal   congestion, Sneezing or runny nose, Fatigue or generalized body aches   (answer no if pain is specific to a body part e.g. back pain), Diarrhea,   Headache? No  Have you had close contact with someone with COVID-19 in the last 7 days? No  (Service Expert  click yes below to proceed with [employer] As Usual   Scheduling)?  Yes

## 2022-04-04 NOTE — TELEPHONE ENCOUNTER
Received call from [address] at [facility] with Red Flag Complaint. Subjective: Caller states \"arm and hand pain\"     Current Symptoms: hx bone deterioration, pt feels like she needs to keep hands at a certain temp or they hurt, knuckle on pointer finger swelling on/off, no problems at this time    Onset: 1 year ago; intermittent    Associated Symptoms: NA    Pain Severity: 0/10; na; na    Temperature: denies     What has been tried: tylenol-helps    LMP: NA Pregnant: NA    Recommended disposition: See PCP within 3 Days, C if unable to schedule    Care advice provided, patient verbalizes understanding; denies any other questions or concerns; instructed to call back for any new or worsening symptoms. Patient/Caller agrees with recommended disposition; writer provided warm transfer to tonny at [facility] for appointment scheduling    Attention Provider: Thank you for allowing me to participate in the care of your patient. The patient was connected to triage in response to information provided to the ECC. Please do not respond through this encounter as the response is not directed to a shared pool.       Reason for Disposition   Patient wants to be seen    Protocols used: HAND AND WRIST PAIN-ADULT-[place]

## 2022-04-06 ENCOUNTER — TRANSCRIBE ORDER (OUTPATIENT)
Dept: SCHEDULING | Age: 77
End: 2022-04-06

## 2022-04-06 DIAGNOSIS — Z12.31 SCREENING MAMMOGRAM FOR HIGH-RISK PATIENT: Primary | ICD-10-CM

## 2022-04-08 ENCOUNTER — OFFICE VISIT (OUTPATIENT)
Dept: INTERNAL MEDICINE CLINIC | Age: 77
End: 2022-04-08
Payer: MEDICARE

## 2022-04-08 VITALS
RESPIRATION RATE: 16 BRPM | TEMPERATURE: 97.3 F | HEART RATE: 70 BPM | SYSTOLIC BLOOD PRESSURE: 134 MMHG | BODY MASS INDEX: 27.68 KG/M2 | DIASTOLIC BLOOD PRESSURE: 71 MMHG | HEIGHT: 66 IN | WEIGHT: 172.2 LBS | OXYGEN SATURATION: 100 %

## 2022-04-08 DIAGNOSIS — M88.9 PAGET'S BONE DISEASE: Primary | ICD-10-CM

## 2022-04-08 DIAGNOSIS — M81.0 OSTEOPOROSIS, UNSPECIFIED OSTEOPOROSIS TYPE, UNSPECIFIED PATHOLOGICAL FRACTURE PRESENCE: ICD-10-CM

## 2022-04-08 DIAGNOSIS — M19.049 HAND ARTHRITIS: ICD-10-CM

## 2022-04-08 DIAGNOSIS — N18.30 STAGE 3 CHRONIC KIDNEY DISEASE, UNSPECIFIED WHETHER STAGE 3A OR 3B CKD (HCC): ICD-10-CM

## 2022-04-08 DIAGNOSIS — I70.1 BILATERAL RENAL ARTERY STENOSIS (HCC): ICD-10-CM

## 2022-04-08 PROCEDURE — G8752 SYS BP LESS 140: HCPCS | Performed by: INTERNAL MEDICINE

## 2022-04-08 PROCEDURE — G8419 CALC BMI OUT NRM PARAM NOF/U: HCPCS | Performed by: INTERNAL MEDICINE

## 2022-04-08 PROCEDURE — G8754 DIAS BP LESS 90: HCPCS | Performed by: INTERNAL MEDICINE

## 2022-04-08 PROCEDURE — G0463 HOSPITAL OUTPT CLINIC VISIT: HCPCS | Performed by: INTERNAL MEDICINE

## 2022-04-08 PROCEDURE — G8427 DOCREV CUR MEDS BY ELIG CLIN: HCPCS | Performed by: INTERNAL MEDICINE

## 2022-04-08 PROCEDURE — G8510 SCR DEP NEG, NO PLAN REQD: HCPCS | Performed by: INTERNAL MEDICINE

## 2022-04-08 PROCEDURE — 99213 OFFICE O/P EST LOW 20 MIN: CPT | Performed by: INTERNAL MEDICINE

## 2022-04-08 PROCEDURE — G8536 NO DOC ELDER MAL SCRN: HCPCS | Performed by: INTERNAL MEDICINE

## 2022-04-08 PROCEDURE — 1101F PT FALLS ASSESS-DOCD LE1/YR: CPT | Performed by: INTERNAL MEDICINE

## 2022-04-08 PROCEDURE — 1090F PRES/ABSN URINE INCON ASSESS: CPT | Performed by: INTERNAL MEDICINE

## 2022-04-08 NOTE — PROGRESS NOTES
HISTORY OF PRESENT ILLNESS  Jesenia Hillman is a 65 y.o. female. HPI   F/u HTN , s/p bl renal artery stents for KALPESH HLD postherpetic neuralgia. leg edema ckd 3 low vit d, mild right carotid carotid artery stenosis, osteopenia with low FRAX  hx pagets  Had declinesd fosamax last yaer --osteoprorosis of forearms, normal spine , osteopejia of hip t-1.0 on last dexa---has ckd 3- Dr Martha Perez  Denies bone pain but has hand OA pain  Lost  last December, lost Sisster in law recently too  Has a lot of family to check on her and managing ok  Last OV       Last LDl 122 Cr 1.52  Takes lasix every other day now.  Has some leg edema but now using support home  Sometime sstaggers when she walks x 5 months-not really dizzy  Had neg brandi in 2017     Dx Pagets in 2017 at [facility]--no bone pain -only c/o some arthritis pain in finger  dexa 2 yrs ago --mild osteopenia with low FRAX    Patient Active Problem List    Diagnosis Date Noted    Essential hypertension, malignant 04/26/2015    Early satiety 08/13/2019    CKD (chronic kidney disease) stage 3, GFR 30-59 ml/min ([facility] 08/13/2019    Osteopenia of hip 04/22/2019    Vitamin D deficiency 04/12/2019    Syncope and collapse 09/30/2018    Syncope due to orthostatic hypotension 09/30/2018    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    Carotid bruit present 12/05/2017    Bilateral carotid artery stenosis 12/05/2017    History of tubal ligation 09/14/2015    Bilateral renal artery stenosis (HCC)--s/p PTA/stenting 7/10/15 [address] 08/09/2015    Stress at home 06/13/2015    H/O gastroesophageal reflux (GERD) 03/12/2015    Obesity 09/04/2013    Edema of both legs--chronic venous insufficiency,amlodipine 09/04/2013    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 09/04/2013    High cholesterol     Left ventricular hypertrophy due to hypertensive disease      Current Outpatient Medications   Medication Sig Dispense Refill    NIFEdipine ER (ADALAT CC) 60 mg ER tablet TAKE 1 TABLET BY MOUTH EVERY DAY 90 Tablet 1    rosuvastatin (CRESTOR) 5 mg tablet TAKE 1 TABLET BY MOUTH DAILY 90 Tab 3    furosemide (LASIX) 20 mg tablet TAKE 1 TABLET BY MOUTH DAILY (Patient taking differently: Take 20 mg by mouth daily.) 90 Tab 3    beclomethasone (Qvar) 80 mcg/actuation aero INHALE 1 PUFF BY MOUTH ONCE DAILY AS NEEDED 1 Inhaler 5    potassium chloride SR (KLOR-CON 10) 10 mEq tablet TAKE 1 TABLET BY MOUTH TWICE DAILY 60 Tab 6    Biotin 2,500 mcg cap Take  by mouth.  ubidecarenone/vitamin E mixed (COQ10  PO) Take  by mouth daily.  cholecalciferol (VITAMIN D3) 1,000 unit tablet Take  by mouth daily.  acetaminophen (TYLENOL) 500 mg tablet Take 500 mg by mouth every six (6) hours as needed for Pain.  aspirin delayed-release 81 mg tablet Take 81 mg by mouth daily.  Indications: PT states stopping 7/22-7/26 for Eye Surgery       Allergies   Allergen Reactions    Amlodipine Other (comments)     Itching,  Muscle cramps    Clonidine Swelling    Ibuprofen Swelling    Levaquin [Levofloxacin] Unknown (comments)    Lisinopril Angioedema    Other Medication Rash and Itching     Patient reports she is allergic to the PPD test for TB    Pcn [Penicillins] Swelling    Pravastatin Myalgia    Sulfa (Sulfonamide Antibiotics) Hives      Lab Results   Component Value Date/Time    WBC 5.8 07/22/2021 02:18 PM    HGB 13.1 07/22/2021 02:18 PM    HCT 39.6 07/22/2021 02:18 PM    PLATELET 968 05/06/2101 02:18 PM    MCV 93.8 07/22/2021 02:18 PM     Lab Results   Component Value Date/Time    Glucose 98 07/22/2021 02:18 PM    Glucose (POC) 96 04/27/2015 09:57 PM    Microalb/Creat ratio (ug/mg creat.) 62.5 (H) 04/02/2015 09:06 AM    LDL, calculated 105.8 (H) 07/22/2021 02:18 PM    Creatinine (POC) 1.1 10/09/2019 09:56 AM    Creatinine 1.33 (H) 07/22/2021 02:18 PM      Lab Results   Component Value Date/Time    Cholesterol, total 213 (H) 07/22/2021 02:18 PM    HDL Cholesterol 84 07/22/2021 02:18 PM    LDL, calculated 105.8 (H) 07/22/2021 02:18 PM    Triglyceride 116 07/22/2021 02:18 PM    CHOL/HDL Ratio 2.5 07/22/2021 02:18 PM     Lab Results   Component Value Date/Time    GFR est non-AA 39 (L) 07/22/2021 02:18 PM    GFRNA, POC 48 (L) 10/09/2019 09:56 AM    GFR est AA 47 (L) 07/22/2021 02:18 PM    GFRAA, POC 59 (L) 10/09/2019 09:56 AM    Creatinine 1.33 (H) 07/22/2021 02:18 PM    Creatinine (POC) 1.1 10/09/2019 09:56 AM    BUN 19 07/22/2021 02:18 PM    Sodium 139 07/22/2021 02:18 PM    Potassium 3.9 07/22/2021 02:18 PM    Chloride 105 07/22/2021 02:18 PM    CO2 27 07/22/2021 02:18 PM    Magnesium 1.7 02/07/2010 03:30 AM        ROS    Physical Exam  Vitals and nursing note reviewed. Constitutional:       Appearance: She is well-developed. Comments: Appears stated age   Cardiovascular:      Rate and Rhythm: Normal rate and regular rhythm. Heart sounds: Normal heart sounds. No murmur heard. No friction rub. No gallop. Pulmonary:      Effort: Pulmonary effort is normal. No respiratory distress. Breath sounds: Normal breath sounds. No wheezing. Abdominal:      General: Bowel sounds are normal.      Palpations: Abdomen is soft. Neurological:      Mental Status: She is alert. ASSESSMENT and PLAN  Diagnoses and all orders for this visit:    1. Paget's bone disease  -     REFERRAL TO RHEUMATOLOGY-? Fosamax but has ckd 3, relative aymptomatic    2. Bilateral renal artery stenosis (HCC)    3. Stage 3 chronic kidney disease, unspecified whether stage 3a or 3b CKD (HCC)  -     REFERRAL TO NEPHROLOGY-fu Dr Carito Frederick    4. Osteoporosis, unspecified osteoporosis type, unspecified pathological fracture presence  -     REFERRAL TO RHEUMATOLOGY-forearm osteoporosis    5. Hand arthritis   Mild symptoms   Tylenol prn    Follow-up and Dispositions    · Return in about 4 months (around 8/8/2022) for medicare wellbess.

## 2022-04-08 NOTE — PROGRESS NOTES
1. \"Have you been to the ER, urgent care clinic since your last visit? Hospitalized since your last visit? \" No    2. \"Have you seen or consulted any other health care providers outside of the [address] [place] [address] since your last visit? \" No     3. For patients aged 39-70: Has the patient had a colonoscopy / FIT/ Cologuard? NA - based on age      If the patient is female:    4. For patients aged 41-77: Has the patient had a mammogram within the past 2 years? NA - based on age or sex      11. For patients aged 21-65: Has the patient had a pap smear?  NA - based on age or sex

## 2022-04-17 NOTE — PROGRESS NOTES
[facility] @ [address], [place]  Subjective/HPI:     Nolberto Russell is a 65 y.o. female history of hypertension, renal artery stenosis stented in 2015, hyperlipidemia, carotid stenosis and CKD followed by Dr. Janice Trejo is here for 6 month follow up. At last visit I referred her to electrophysiology for frequent PVCs and episodes of 2:1 heart block. It was recommended that she have a pacemaker placed however she has deferred. Since last visit she denies lightheadedness dizziness palpitations. Her  had passed in December. EP Consult 11/2021 for Frequent PVC   Nolberto Russell is a pleasant lady referred for pvc burden 9% and episodes of 2:1 heart block. She denies dizziness or palpitations. We cannot treat the pvcs with av sophia agents secondary to the 2:1 heart block. She is not interest in any invasive procedures - discussed pvc ablation vs dc ppm/bb therapy. Her lvef is wnl. Would obtain a sleep study to r/o norma as a cause of her overnight heart block. She will f/u on a prn basis.       11/2021  Echo Findings    Left Ventricle Normal cavity size and systolic function (ejection fraction normal). Upper normal wall thickness. The estimated EF is 60 - 65%. Visually measured ejection fraction. Left Atrium Mildly dilated left atrium. Right Ventricle Normal cavity size. Right Atrium Normal cavity size. Aortic Valve Trileaflet valve structure and no stenosis. Mild aortic valve regurgitation. Mitral Valve No stenosis. Mitral valve non-specific thickening. Mild mitral annular calcification. Mild regurgitation. Tricuspid Valve Normal valve structure and no stenosis. Trace regurgitation. Pulmonary hypertension not suggested by Doppler findings. Pulmonic Valve Pulmonic valve not well visualized. No stenosis. Trace regurgitation. Pulmonary Artery Pulmonary hypertension not suggested by Doppler findings.    Aorta Normal aortic root, ascending aortic, and aortic arch. IVC/Hepatic Veins Normal structure. Normal central venous pressure (3 mmHg); IVC diameter is less than 21 mm and collapses more than 50% with respiration. Pericardium No evidence of pericardial effusion     Holter Monitor 11/2021  Patient monitored for 3d 18h starting on 10/18/2021 10:30 am.  Average heart rate was 62 bpm, Minimum heart rate was 38 bpm on Day 5 / 05:27:59 am, Max heart rate was 96  bpm on Day 3 / 11:48:15 am  Atrial Fibrillation or Flutter: [place] was 0 %  SVE(s): [place] was 0.01 %, max count per 24 hours 13  SVT (AT, RT): 1 events, longest event 3 beats on Day 1 / 10:35:44 am, fastest event 103 bpm on Day 1 / 10:35:44  am  Pause: 245 events, longest pause 2.7 sec on Day 3 / 01:10:31 am  AV Block: 0.01 %, most severe block demonstrated 2nd degree, Mobitz I  PVC(s): [place] was 9.43 %, max count per 24 hours 8436, 2 disparate morphologies  Ventricular Tachycardia: 3 events, longest event 4 beats at Day 3 / 03:39:28 pm, fastest rate 159 bpm at Day 4 / 09:19:49 am    Carotid 9/2021  Interpretation Summary    · There is mild stenosis in the right ICA (<50%). · There is mild stenosis in the left ICA (<50%). · The right vertebral is antegrade. · The left vertebral is antegrade.          Nuclear Stress Test 8/2020  Gated SPECT images reviewed in 3 planes show appropriate LV systolic wall thickening. There is no segmental wall motion abnormality of the left ventricular myocardium. The calculated LV ejection fraction is 77%. Pulmonary uptake and left ventricular cavity size appear normal.  IMPRESSION: No ischemia demonstrated. No infarct visible. LVEF 77%.     PCP Provider  Valente Schmitz MD  Past Medical History:   Diagnosis Date    Arthritis     hands    Asthma     Essential hypertension     GERD (gastroesophageal reflux disease)     High cholesterol     HTN (hypertension)     Ill-defined condition     hyperlipidemia    Left ventricular hypertrophy due to hypertensive disease     Murmur     Renal artery stenosis ([address]     Shingles 08/11/2018    Syncope       Past Surgical History:   Procedure Laterality Date    HX BREAST BIOPSY Left 20 years ago    negative    HX CATARACT REMOVAL  07/2018    right eye    HX GI      open inquinal hernia repair    HX GI      6/29/18:  pt reports mulitple abdominal surgeries but unable to recall exact types    HX GYN      tubal ligation    HX HEART CATHETERIZATION  2000    normal    HX UROLOGICAL      Bilateral Renal stents. Allergies   Allergen Reactions    Amlodipine Other (comments)     Itching,  Muscle cramps    Clonidine Swelling    Ibuprofen Swelling    Levaquin [Levofloxacin] Unknown (comments)    Lisinopril Angioedema    Other Medication Rash and Itching     Patient reports she is allergic to the PPD test for TB    Pcn [Penicillins] Swelling    Pravastatin Myalgia    Sulfa (Sulfonamide Antibiotics) Hives      Family History   Problem Relation Age of Onset    Cancer Mother     Heart Disease Mother     Heart Disease Father     Hypertension Father     Lung Disease Father     Hypertension Brother     Diabetes Brother     Heart Disease Brother     Kidney Disease Brother     Hypertension Brother       Current Outpatient Medications   Medication Sig    NIFEdipine ER (ADALAT CC) 60 mg ER tablet TAKE 1 TABLET BY MOUTH EVERY DAY    rosuvastatin (CRESTOR) 5 mg tablet TAKE 1 TABLET BY MOUTH DAILY    furosemide (LASIX) 20 mg tablet TAKE 1 TABLET BY MOUTH DAILY (Patient taking differently: Take 20 mg by mouth daily.)    beclomethasone (Qvar) 80 mcg/actuation aero INHALE 1 PUFF BY MOUTH ONCE DAILY AS NEEDED    potassium chloride SR (KLOR-CON 10) 10 mEq tablet TAKE 1 TABLET BY MOUTH TWICE DAILY    Biotin 2,500 mcg cap Take  by mouth.  ubidecarenone/vitamin E mixed (COQ10  PO) Take  by mouth daily.  cholecalciferol (VITAMIN D3) 1,000 unit tablet Take  by mouth daily.     acetaminophen (TYLENOL) 500 mg tablet Take 500 mg by mouth every six (6) hours as needed for Pain.  aspirin delayed-release 81 mg tablet Take 81 mg by mouth daily. Indications: PT states stopping 7/22-7/26 for Eye Surgery     No current facility-administered medications for this visit. There were no vitals filed for this visit. Social History     Socioeconomic History    Marital status:      Spouse name: Not on file    Number of children: Not on file    Years of education: Not on file    Highest education level: Not on file   Occupational History    Not on file   Tobacco Use    Smoking status: Never Smoker    Smokeless tobacco: Never Used   Substance and Sexual Activity    Alcohol use: No     Comment: Drinks Lite Beer    Drug use: Yes     Types: Prescription, OTC     Comment: perocet    Sexual activity: Not Currently     Partners: Male   Other Topics Concern    Not on file   Social History Narrative    Not on file     Social Determinants of Health     Financial Resource Strain:     Difficulty of Paying Living Expenses: Not on file   Food Insecurity:     Worried About Running Out of Food in the Last Year: Not on file    Nic of Food in the Last Year: Not on file   Transportation Needs:     Lack of Transportation (Medical): Not on file    Lack of Transportation (Non-Medical):  Not on file   Physical Activity:     Days of Exercise per Week: Not on file    Minutes of Exercise per Session: Not on file   Stress:     Feeling of Stress : Not on file   Social Connections:     Frequency of Communication with Friends and Family: Not on file    Frequency of Social Gatherings with Friends and Family: Not on file    Attends [employer] Services: Not on file    Active Member of Clubs or Organizations: Not on file    Attends Club or Organization Meetings: Not on file    Marital Status: Not on file   Intimate Partner Violence:     Fear of Current or Ex-Partner: Not on file    Emotionally Abused: Not on file   [facility] Physically Abused: Not on file    Sexually Abused: Not on file   Housing Stability:     Unable to Pay for Housing in the Last Year: Not on file    Number of Places Lived in the Last Year: Not on file    Unstable Housing in the Last Year: Not on file       I have reviewed the nurses notes, vitals, problem list, allergy list, medical history, family, social history and medications. Review of Symptoms  11 systems reviewed, negative other than as stated in the HPI      Physical Exam:      General: Well developed, in no acute distress, cooperative and alert  HEENT: No carotid bruits, no JVD, trach is midline. Neck Supple, PERRL, EOM intact. Heart:  Normal S1/S2 negative S3 or S4. Regular, 2/6 diastolic murmur, no  gallop or rub. Respiratory: Clear bilaterally x 4, no wheezing or rales  Abdomen:   Soft, non-tender, no masses, bowel sounds are active. Extremities:  No edema, normal cap refill, no cyanosis, atraumatic. Neuro: A&Ox3, speech clear, gait stable. Skin: Skin color is normal. No rashes or lesions.  Non diaphoretic  Vascular: 2+ pulses symmetric in all extremities    Cardiographics    ECG: Sinus bradycardia rate 59 bpm, no PVCs        Cardiology Labs:  Lab Results   Component Value Date/Time    Cholesterol, total 213 (H) 07/22/2021 02:18 PM    HDL Cholesterol 84 07/22/2021 02:18 PM    LDL, calculated 105.8 (H) 07/22/2021 02:18 PM    Triglyceride 116 07/22/2021 02:18 PM    CHOL/HDL Ratio 2.5 07/22/2021 02:18 PM       Lab Results   Component Value Date/Time    Sodium 139 07/22/2021 02:18 PM    Potassium 3.9 07/22/2021 02:18 PM    Chloride 105 07/22/2021 02:18 PM    CO2 27 07/22/2021 02:18 PM    Anion gap 7 07/22/2021 02:18 PM    Glucose 98 07/22/2021 02:18 PM    BUN 19 07/22/2021 02:18 PM    Creatinine 1.33 (H) 07/22/2021 02:18 PM    BUN/Creatinine ratio 14 07/22/2021 02:18 PM    GFR est AA 47 (L) 07/22/2021 02:18 PM    GFR est non-AA 39 (L) 07/22/2021 02:18 PM    Calcium 9.8 07/22/2021 02:18 PM Bilirubin, total 0.5 07/22/2021 02:18 PM    Alk. phosphatase 374 (H) 07/22/2021 02:18 PM    Protein, total 8.0 07/22/2021 02:18 PM    Albumin 4.2 07/22/2021 02:18 PM    Globulin 3.8 07/22/2021 02:18 PM    A-G Ratio 1.1 07/22/2021 02:18 PM    ALT (SGPT) 23 07/22/2021 02:18 PM           Assessment:     Assessment:     Diagnoses and all orders for this visit:    1. Essential hypertension, malignant    2. Stage 3 chronic kidney disease, unspecified whether stage 3a or 3b CKD ([facility]    3. Hypertensive left ventricular hypertrophy, without heart failure        ICD-10-CM ICD-9-CM    1. Essential hypertension, malignant  I10 401.0    2. Stage 3 chronic kidney disease, unspecified whether stage 3a or 3b CKD (HCC)  N18.30 585.3    3. Hypertensive left ventricular hypertrophy, without heart failure  I11.9 402.90      No orders of the defined types were placed in this encounter. Plan:        1. Hypertension: Normotensive 125/70  2. History renal artery stenosis: Stented 2015 followed by Mario Aponte. [address] 9/2019 stents patent.   3. High Cholesterol: On Crestor labs per Dr. Ivan Mckinney   4. Right carotid stenosis: Duplex 8/2020 < 50% bilaterally  5. CKD: Creatinine 1.3 GFR 47 7/21 Dr Beck following   6. Frequent PVC's: 9.4% burden with hx of Mobitz 1 and 2.7 sec pause. UNABLE TO USE SOPHIA AGENT for PVC's, seen by EP pt deferred ablation PPM.   7. Moderate Aortic Regurgitation: Asymptomatic     59-year-old female with event monitor showing frequent PVCs Mobitz 1 heart block and a 2.7-second pause during hours of sleep. Deferred pacemaker. At this time asymptomatic, counseled patient on risk of heart block progression along with signs and symptoms. She will avoid sophia agents. Follow-up in 6 months unless she becomes symptomatic. I have referred her to sleep medicine to rule out BERENICE for nocturnal bradycardic event.     Follow-up 6 months in [place] office  Jarrod Reno NP      Please note that this dictation was completed with Dragon, the computer voice recognition software. Quite often unanticipated grammatical, syntax, homophones, and other interpretive errors are inadvertently transcribed by the computer software. Please disregard these errors. Please excuse any errors that have escaped final proofreading. Thank you.

## 2022-04-18 ENCOUNTER — OFFICE VISIT (OUTPATIENT)
Dept: CARDIOLOGY CLINIC | Age: 77
End: 2022-04-18
Payer: MEDICARE

## 2022-04-18 VITALS
BODY MASS INDEX: 27.76 KG/M2 | DIASTOLIC BLOOD PRESSURE: 70 MMHG | HEART RATE: 90 BPM | TEMPERATURE: 97 F | WEIGHT: 172 LBS | SYSTOLIC BLOOD PRESSURE: 125 MMHG

## 2022-04-18 DIAGNOSIS — I11.9 HYPERTENSIVE LEFT VENTRICULAR HYPERTROPHY, WITHOUT HEART FAILURE: ICD-10-CM

## 2022-04-18 DIAGNOSIS — I10 ESSENTIAL HYPERTENSION, MALIGNANT: Primary | ICD-10-CM

## 2022-04-18 DIAGNOSIS — N18.30 STAGE 3 CHRONIC KIDNEY DISEASE, UNSPECIFIED WHETHER STAGE 3A OR 3B CKD (HCC): ICD-10-CM

## 2022-04-18 PROCEDURE — G8427 DOCREV CUR MEDS BY ELIG CLIN: HCPCS | Performed by: NURSE PRACTITIONER

## 2022-04-18 PROCEDURE — G8419 CALC BMI OUT NRM PARAM NOF/U: HCPCS | Performed by: NURSE PRACTITIONER

## 2022-04-18 PROCEDURE — G8752 SYS BP LESS 140: HCPCS | Performed by: NURSE PRACTITIONER

## 2022-04-18 PROCEDURE — G8754 DIAS BP LESS 90: HCPCS | Performed by: NURSE PRACTITIONER

## 2022-04-18 PROCEDURE — G8432 DEP SCR NOT DOC, RNG: HCPCS | Performed by: NURSE PRACTITIONER

## 2022-04-18 PROCEDURE — 1101F PT FALLS ASSESS-DOCD LE1/YR: CPT | Performed by: NURSE PRACTITIONER

## 2022-04-18 PROCEDURE — 93000 ELECTROCARDIOGRAM COMPLETE: CPT | Performed by: NURSE PRACTITIONER

## 2022-04-18 PROCEDURE — 99214 OFFICE O/P EST MOD 30 MIN: CPT | Performed by: NURSE PRACTITIONER

## 2022-04-18 PROCEDURE — G8536 NO DOC ELDER MAL SCRN: HCPCS | Performed by: NURSE PRACTITIONER

## 2022-04-18 PROCEDURE — G8399 PT W/DXA RESULTS DOCUMENT: HCPCS | Performed by: NURSE PRACTITIONER

## 2022-04-18 PROCEDURE — 1090F PRES/ABSN URINE INCON ASSESS: CPT | Performed by: NURSE PRACTITIONER

## 2022-04-18 NOTE — PROGRESS NOTES
Identified pt with two pt identifiers(name and ). Reviewed record in preparation for visit and have obtained necessary documentation. All patient medications has been reviewed. Chief Complaint   Patient presents with    Follow-up       3 most recent PHQ Screens 2022   Little interest or pleasure in doing things Not at all   Feeling down, depressed, irritable, or hopeless Not at all   Total Score PHQ 2 0     Abuse Screening Questionnaire 2021   Do you ever feel afraid of your partner? N   Are you in a relationship with someone who physically or mentally threatens you? N   Is it safe for you to go home? Y       Health Maintenance Due   Topic    Pneumococcal 65+ years (2 - PCV)     Health Maintenance Review: Patient reminded of \"due or due soon\" health maintenance. I have asked the patient to contact his/her primary care provider (PCP) for follow-up on his/her health maintenance. Vitals:    22 0917   BP: 125/70   Pulse: 90   Temp: 97 °F (36.1 °C)   TempSrc: Oral   Weight: 172 lb (78 kg)       Wt Readings from Last 3 Encounters:   22 172 lb (78 kg)   22 172 lb 3.2 oz (78.1 kg)   21 177 lb 9.6 oz (80.6 kg)     Temp Readings from Last 3 Encounters:   22 97 °F (36.1 °C) (Oral)   22 97.3 °F (36.3 °C) (Temporal)   21 97.8 °F (36.6 °C) (Temporal)     BP Readings from Last 3 Encounters:   22 125/70   22 134/71   21 126/66     Pulse Readings from Last 3 Encounters:   22 90   22 70   21 65       1. \"Have you been to the ER, urgent care clinic since your last visit? Hospitalized since your last visit? \" No    2. \"Have you seen or consulted any other health care providers outside of the [address] [place] [address] since your last visit? \" No

## 2022-04-25 ENCOUNTER — HOSPITAL ENCOUNTER (OUTPATIENT)
Dept: MAMMOGRAPHY | Age: 77
Discharge: HOME | End: 2022-04-25
Attending: INTERNAL MEDICINE
Payer: MEDICARE

## 2022-04-25 DIAGNOSIS — Z12.31 SCREENING MAMMOGRAM FOR HIGH-RISK PATIENT: ICD-10-CM

## 2022-04-25 PROCEDURE — 77063 BREAST TOMOSYNTHESIS BI: CPT

## 2022-05-27 RX ORDER — ROSUVASTATIN CALCIUM 5 MG/1
TABLET, COATED ORAL
Qty: 90 TABLET | Refills: 3 | Status: SHIPPED | OUTPATIENT
Start: 2022-05-27

## 2022-05-27 NOTE — TELEPHONE ENCOUNTER
PCP: Radha Daniel MD    Last appt: 4/8/2022  Future Appointments   Date Time Provider Alvina Dixon   6/14/2022  9:40 AM Alber Torres MD [facility] BS AMB   8/2/2022  9:45 AM Radha Daniel MD [facility] BS AMB       Requested Prescriptions     Pending Prescriptions Disp Refills    rosuvastatin (CRESTOR) 5 mg tablet 90 Tablet 3     Sig: TAKE 1 TABLET BY MOUTH DAILY       Prior labs and Blood pressures:  BP Readings from Last 3 Encounters:   04/18/22 125/70   04/08/22 134/71   11/11/21 126/66     Lab Results   Component Value Date/Time    Sodium 139 07/22/2021 02:18 PM    Potassium 3.9 07/22/2021 02:18 PM    Chloride 105 07/22/2021 02:18 PM    CO2 27 07/22/2021 02:18 PM    Anion gap 7 07/22/2021 02:18 PM    Glucose 98 07/22/2021 02:18 PM    BUN 19 07/22/2021 02:18 PM    Creatinine 1.33 (H) 07/22/2021 02:18 PM    BUN/Creatinine ratio 14 07/22/2021 02:18 PM    GFR est AA 47 (L) 07/22/2021 02:18 PM    GFR est non-AA 39 (L) 07/22/2021 02:18 PM    Calcium 9.8 07/22/2021 02:18 PM     No results found for: HBA1C, BGX3OILG, KDS7ULLO  Lab Results   Component Value Date/Time    Cholesterol, total 213 (H) 07/22/2021 02:18 PM    HDL Cholesterol 84 07/22/2021 02:18 PM    LDL, calculated 105.8 (H) 07/22/2021 02:18 PM    VLDL, calculated 23.2 07/22/2021 02:18 PM    Triglyceride 116 07/22/2021 02:18 PM    CHOL/HDL Ratio 2.5 07/22/2021 02:18 PM     Lab Results   Component Value Date/Time    VITAMIN D, 25-HYDROXY 36.6 10/08/2019 10:23 AM       Lab Results   Component Value Date/Time    TSH 2.98 07/22/2021 02:18 PM

## 2022-06-14 ENCOUNTER — OFFICE VISIT (OUTPATIENT)
Dept: SLEEP MEDICINE | Age: 77
End: 2022-06-14
Payer: MEDICARE

## 2022-06-14 VITALS
DIASTOLIC BLOOD PRESSURE: 75 MMHG | SYSTOLIC BLOOD PRESSURE: 147 MMHG | HEIGHT: 66 IN | BODY MASS INDEX: 27.1 KG/M2 | OXYGEN SATURATION: 97 % | TEMPERATURE: 97.5 F | HEART RATE: 62 BPM | WEIGHT: 168.6 LBS | RESPIRATION RATE: 20 BRPM

## 2022-06-14 DIAGNOSIS — G47.52 REM SLEEP BEHAVIOR DISORDER: ICD-10-CM

## 2022-06-14 DIAGNOSIS — I10 ESSENTIAL HYPERTENSION, MALIGNANT: ICD-10-CM

## 2022-06-14 DIAGNOSIS — G47.33 OBSTRUCTIVE SLEEP APNEA (ADULT) (PEDIATRIC): Primary | ICD-10-CM

## 2022-06-14 PROCEDURE — G8427 DOCREV CUR MEDS BY ELIG CLIN: HCPCS | Performed by: INTERNAL MEDICINE

## 2022-06-14 PROCEDURE — 1123F ACP DISCUSS/DSCN MKR DOCD: CPT | Performed by: INTERNAL MEDICINE

## 2022-06-14 PROCEDURE — G8753 SYS BP > OR = 140: HCPCS | Performed by: INTERNAL MEDICINE

## 2022-06-14 PROCEDURE — G8754 DIAS BP LESS 90: HCPCS | Performed by: INTERNAL MEDICINE

## 2022-06-14 PROCEDURE — G8536 NO DOC ELDER MAL SCRN: HCPCS | Performed by: INTERNAL MEDICINE

## 2022-06-14 PROCEDURE — G8417 CALC BMI ABV UP PARAM F/U: HCPCS | Performed by: INTERNAL MEDICINE

## 2022-06-14 PROCEDURE — 1090F PRES/ABSN URINE INCON ASSESS: CPT | Performed by: INTERNAL MEDICINE

## 2022-06-14 PROCEDURE — 1101F PT FALLS ASSESS-DOCD LE1/YR: CPT | Performed by: INTERNAL MEDICINE

## 2022-06-14 PROCEDURE — 99204 OFFICE O/P NEW MOD 45 MIN: CPT | Performed by: INTERNAL MEDICINE

## 2022-06-14 PROCEDURE — G8432 DEP SCR NOT DOC, RNG: HCPCS | Performed by: INTERNAL MEDICINE

## 2022-06-14 PROCEDURE — G8399 PT W/DXA RESULTS DOCUMENT: HCPCS | Performed by: INTERNAL MEDICINE

## 2022-06-14 NOTE — PROGRESS NOTES
[address] [facility] [place]. [place], [address]  Tel.  [phone]  Fax. [address] [facility] [address]  [place], [address] [facility]  Tel.  [phone]  Fax. [phone] [address]  Tel.  [phone]  Fax. [phone]         Subjective:      Elvin Perry is an 65 y.o. female referred for evaluation for a sleep disorder. She complains of being advised to be evaluated for sleep apnea associated with heart rhythm problems. Symptoms began several months ago, unchanged since that time. She usually can fall asleep in 20-30 minutes. Family or house members note snoring. She denies falling asleep while driving. Elvin Perry does wake up frequently at night. She is not bothered by waking up too early and left unable to get back to sleep. She actually sleeps about 7 hours at night and wakes up about   times during the night. She does not work shifts: Carlos Amaro indicates she does not get too little sleep at night. Her bedtime is  (8-10pm). She awakens at  (4-7am). She does take naps. She takes 3 naps a week lasting 45 min to an hour. She has the following observed behaviors: Sleep talking,Kicking with legs; Nightmares. Other remarks:  (hallucinations) used to see shadows (especially if she had been watching scary things on TV)  She had one episode when she fell out of bed when she woke up fighting in her sleep  She follows with cardiology and has been having skipped beats.    [place] Sleepiness Score: 6       Allergies   Allergen Reactions    Amlodipine Other (comments)     Itching,  Muscle cramps    Clonidine Swelling    Ibuprofen Swelling    Levaquin [Levofloxacin] Unknown (comments)    Lisinopril Angioedema    Other Medication Rash and Itching     Patient reports she is allergic to the PPD test for TB    Pcn [Penicillins] Swelling    Pravastatin Myalgia    Sulfa (Sulfonamide Antibiotics) Hives         Current Outpatient Medications:    rosuvastatin (CRESTOR) 5 mg tablet, TAKE 1 TABLET BY MOUTH DAILY, Disp: 90 Tablet, Rfl: 3    NIFEdipine ER (ADALAT CC) 60 mg ER tablet, TAKE 1 TABLET BY MOUTH EVERY DAY, Disp: 90 Tablet, Rfl: 1    furosemide (LASIX) 20 mg tablet, TAKE 1 TABLET BY MOUTH DAILY (Patient taking differently: Take 20 mg by mouth daily.), Disp: 90 Tab, Rfl: 3    beclomethasone (Qvar) 80 mcg/actuation aero, INHALE 1 PUFF BY MOUTH ONCE DAILY AS NEEDED, Disp: 1 Inhaler, Rfl: 5    potassium chloride SR (KLOR-CON 10) 10 mEq tablet, TAKE 1 TABLET BY MOUTH TWICE DAILY, Disp: 60 Tab, Rfl: 6    Biotin 2,500 mcg cap, Take  by mouth., Disp: , Rfl:     ubidecarenone/vitamin E mixed (COQ10  PO), Take  by mouth daily. , Disp: , Rfl:     cholecalciferol (VITAMIN D3) 1,000 unit tablet, Take  by mouth daily. , Disp: , Rfl:     acetaminophen (TYLENOL) 500 mg tablet, Take 500 mg by mouth every six (6) hours as needed for Pain., Disp: , Rfl:     aspirin delayed-release 81 mg tablet, Take 81 mg by mouth daily. Indications: PT states stopping 7/22-7/26 for Eye Surgery, Disp: , Rfl:      She  has a past medical history of Arthritis, Asthma, Essential hypertension, GERD (gastroesophageal reflux disease), High cholesterol, HTN (hypertension), Ill-defined condition, Left ventricular hypertrophy due to hypertensive disease, Murmur, Renal artery stenosis ([address] Shingles (08/11/2018), and Syncope. She has no past medical history of Atrial fibrillation ([address] CAD (coronary artery disease), Cancer ([address] Carotid artery disease ([address] Chronic kidney disease, Chronic obstructive pulmonary disease ([address] Clotting disorder ([address] Congestive heart failure ([address] Diabetes ([address] Glaucoma, ICD (implantable cardioverter-defibrillator) in place, Long term current use of anticoagulant therapy, Myocardial infarction [facility]), Pacemaker, Pulmonary embolism ([address] Rheumatic fever, Stroke [facility]), Thyroid disease, or Valvular heart disease.     She  has a past surgical history that includes hx gyn; hx breast biopsy (Left, 20 years ago); hx heart catheterization (2000); hx gi; hx gi; hx urological; and hx cataract removal (07/2018). She family history includes Cancer in her mother; Diabetes in her brother; Heart Disease in her brother, father, and mother; Hypertension in her brother, brother, and father; Kidney Disease in her brother; Lung Disease in her father. She  reports that she has never smoked. She has never used smokeless tobacco. She reports current drug use. Drugs: Prescription and OTC. She reports that she does not drink alcohol. Review of Systems:  Constitutional:  Recent weight loss (since she was taking care of her )  Eyes:  No blurred vision.   CVS:  No significant chest pain  Pulm:  No significant shortness of breath  GI:  No significant nausea or vomiting  :  No significant nocturia  Musculoskeletal:  some joint pain at night (hands)  Skin:  No significant rashes  Neuro:  No significant dizziness, she had an episode where she lost consciousness and was admitted to [facility] for a few days (that was in 2017- no further episodes)  Psych: grieving loss of  who passed in December    Sleep Review of Systems: notable for no difficulty falling asleep; +frequent awakenings at night;  regular dreaming noted; occasional  nightmares ; no early morning headaches; no memory problems; no concentration issues    Objective:     Visit Vitals  BP (!) 147/75 (BP 1 Location: Right arm, BP Patient Position: Sitting, BP Cuff Size: Large adult)   Pulse 62   Temp 97.5 °F (36.4 °C) (Temporal)   Resp 20   Ht 5' 6\" (1.676 m)   Wt 168 lb 9.6 oz (76.5 kg)   LMP  (LMP Unknown)   SpO2 97%   BMI 27.21 kg/m²         General:   Not in acute distress   Eyes:  Anicteric sclerae, no obvious strabismus   Nose:  No obvious nasal septum deviation    Oropharynx:   Class 3 oropharyngeal outlet, thick tongue base, low-lying soft palate, narrow tonsilo-pharyngeal pilars   Tonsils: tonsils are present and normal   Neck:    ; midline trachea   Chest/Lungs:  Equal lung expansion, clear on auscultation    CVS:  Normal rate, regular rhythm; no JVD   Skin:  Warm to touch; no obvious rashes   Neuro:  No focal deficits ; no obvious tremor    Psych:  Normal affect,  normal countenance;          Assessment:       ICD-10-CM ICD-9-CM    1. Obstructive sleep apnea (adult) (pediatric)  G47.33 327.23 POLYSOMNOGRAPHY 1 NIGHT   2. REM sleep behavior disorder  G47.52 327.42 POLYSOMNOGRAPHY 1 NIGHT   3. Essential hypertension, malignant  I10 401.0          Plan:     * The patient currently has a Moderate Risk for having sleep apnea. STOP-BANG score 3.  * PSG was ordered for initial evaluation. We will follow the American Academy of Sleep Medicine protocol regarding split-night procedures and offering a trial of Positive Airway Pressure (CPAP, BPAP, etc.)  * She was provided information on sleep apnea including coresponding risk factors and the importance of proper treatment. * Counseling was provided regarding proper sleep hygiene and safe driving. Treatment options for sleep apnea were reviewed. she would like to proceed with a trial of PAP if found to have significant apnea. 2. REM Behavior Disorder - I have reviewed the importance of ensuring a safe sleeping environment. Sleeping in a sleeping bag will keep arms and legs contained. Separate beds is an option or sleeping on the floor or in a recliner if it prevents getting out of the bed. Medications that can aggravated REM behavior disorder were reviewed. (she is not taking any and says she only rarely will have a cold beer). 3. Hypertension - she continues on her current regimen. I have reviewed the relationship between hypertension as it relates to sleep-disordered breathing. Thank you for allowing us to participate in your patient's medical care. We'll keep you updated on these investigations.     Electronically signed by    Ravinder Distance Linda Swan in Sleep Medicine  [facility]    6/14/2022

## 2022-06-14 NOTE — Clinical Note
Thank you for the referral.  I will keep you informed of her progress.   [address],  [place]

## 2022-06-14 NOTE — PATIENT INSTRUCTIONS
[address] [facility] [place]. [place], [address]  Tel.  [phone]  Fax. 100 [facility] 60  [place], [address] [facility]  Tel.  [phone]  Fax. [phone] [address]  Tel.  [phone]  Fax. [phone]     Sleep Apnea: After Your Visit  Your Care Instructions  Sleep apnea occurs when you frequently stop breathing for 10 seconds or longer during sleep. It can be mild to severe, based on the number of times per hour that you stop breathing or have slowed breathing. Blocked or narrowed airways in your nose, mouth, or throat can cause sleep apnea. Your airway can become blocked when your throat muscles and tongue relax during sleep. Sleep apnea is common, occurring in 1 out of 20 individuals. Individuals having any of the following characteristics should be evaluated and treated right away due to high risk and detrimental consequences from untreated sleep apnea:  1. Obesity  2. Congestive Heart failure  3. Atrial Fibrillation  4. Uncontrolled Hypertension  5. Type II Diabetes  6. Night-time Arrhythmias  7. Stroke  8. Pulmonary Hypertension  9. High-risk Driving Populations (pilots, truck drivers, etc.)  10. Patients Considering Weight-loss Surgery    How do you know you have sleep apnea? You probably have sleep apnea if you answer 'yes' to 3 or more of the following questions:  S - Have you been told that you Snore? T - Are you often Tired during the day? O - Has anyone Observed you stop breathing while sleeping? P- Do you have (or are being treated for) high blood Pressure? B - Are you obese (Body Mass Index > 35)? A - Is your Age 48years old or older? N - Is your Neck size greater than 16 inches? G - Are you male Gender? A sleep physician can prescribe a breathing device that prevents tissues in the throat from blocking your airway.  Or your doctor may recommend using a dental device (oral breathing device) to help keep your airway open. In some cases, surgery may be needed to remove enlarged tissues in the throat. Follow-up care is a key part of your treatment and safety. Be sure to make and go to all appointments, and call your doctor if you are having problems. It's also a good idea to know your test results and keep a list of the medicines you take. How can you care for yourself at home? · Lose weight, if needed. It may reduce the number of times you stop breathing or have slowed breathing. · Go to bed at the same time every night. · Sleep on your side. It may stop mild apnea. If you tend to roll onto your back, sew a pocket in the back of your pajama top. Put a tennis ball into the pocket, and stitch the pocket shut. This will help keep you from sleeping on your back. · Avoid alcohol and medicines such as sleeping pills and sedatives before bed. · Do not smoke. Smoking can make sleep apnea worse. If you need help quitting, talk to your doctor about stop-smoking programs and medicines. These can increase your chances of quitting for good. · Prop up the head of your bed 4 to 6 inches by putting bricks under the legs of the bed. · Treat breathing problems, such as a stuffy nose, caused by a cold or allergies. · Use a continuous positive airway pressure (CPAP) breathing machine if lifestyle changes do not help your apnea and your doctor recommends it. The machine keeps your airway from closing when you sleep. · If CPAP does not help you, ask your doctor whether you should try other breathing machines. A bilevel positive airway pressure machine has two types of air pressureâone for breathing in and one for breathing out. Another device raises or lowers air pressure as needed while you breathe. · If your nose feels dry or bleeds when using one of these machines, talk with your doctor about increasing moisture in the air. A humidifier may help.   · If your nose is runny or stuffy from using a breathing machine, talk with your doctor about using decongestants or a corticosteroid nasal spray. When should you call for help? Watch closely for changes in your health, and be sure to contact your doctor if:  · You still have sleep apnea even though you have made lifestyle changes. · You are thinking of trying a device such as CPAP. · You are having problems using a CPAP or similar machine. Where can you learn more? Go to [employer]. Enter Y935 in the search box to learn more about \"Sleep Apnea: After Your Visit. \"   © 9554-6814 Healthwise, [employer]. Care instructions adapted under license by Ricardo Centeno (which disclaims liability or warranty for this information). This care instruction is for use with your licensed healthcare professional. If you have questions about a medical condition or this instruction, always ask your healthcare professional. Immanuel Mendezams any warranty or liability for your use of this information. PROPER SLEEP HYGIENE    What to avoid  · Do not have drinks with caffeine, such as coffee or black tea, for 8 hours before bed. · Do not smoke or use other types of tobacco near bedtime. Nicotine is a stimulant and can keep you awake. · Avoid drinking alcohol late in the evening, because it can cause you to wake in the middle of the night. · Do not eat a big meal close to bedtime. If you are hungry, eat a light snack. · Do not drink a lot of water close to bedtime, because the need to urinate may wake you up during the night. · Do not read or watch TV in bed. Use the bed only for sleeping and sexual activity. What to try  · Go to bed at the same time every night, and wake up at the same time every morning. Do not take naps during the day. · Keep your bedroom quiet, dark, and cool. · Get regular exercise, but not within 3 to 4 hours of your bedtime. .  · Sleep on a comfortable pillow and mattress.   · If watching the clock makes you anxious, turn it facing away from you so you cannot see the time. · If you worry when you lie down, start a worry book. Well before bedtime, write down your worries, and then set the book and your concerns aside. · Try meditation or other relaxation techniques before you go to bed. · If you cannot fall asleep, get up and go to another room until you feel sleepy. Do something relaxing. Repeat your bedtime routine before you go to bed again. · Make your house quiet and calm about an hour before bedtime. Turn down the lights, turn off the TV, log off the computer, and turn down the volume on music. This can help you relax after a busy day. Drowsy Driving  The [address] [place] [address] Road Traffic Safety Administration cites drowsiness as a causing factor in more than 224,568 police reported crashes annually, resulting in 76,000 injuries and 1,500 deaths. Other surveys suggest 55% of people polled have driven while drowsy in the past year, 23% had fallen asleep but not crashed, 3% crashed, and 2% had and accident due to drowsy driving. Who is at risk? Young Drivers: One study of drowsy driving accidents states that 55% of the drivers were under 25 years. Of those, 75% were male. Shift Workers and Travelers: People who work overnight or travel across time zones frequently are at higher risk of experiencing Circadian Rhythm Disorders. They are trying to work and function when their body is programed to sleep. Sleep Deprived: Lack of sleep has a serious impact on your ability to pay attention or focus on a task. Consistently getting less than the average of 8 hours your body needs creates partial or cumulative sleep deprivation. Untreated Sleep Disorders: Sleep Apnea, Narcolepsy, R.L.S., and other sleep disorders (untreated) prevent a person from getting enough restful sleep. This leads to excessive daytime sleepiness and increases the risk for drowsy driving accidents by up to 7 times.   Medications / Alcohol: Even over the counter medications can cause drowsiness. Medications that impair a drivers attention should have a warning label. Alcohol naturally makes you sleepy and on its own can cause accidents. Combined with excessive drowsiness its effects are amplified. Signs of Drowsy Driving:   * You don't remember driving the last few miles   * You may drift out of your bipin   * You are unable to focus and your thoughts wander   * You may yawn more often than normal   * You have difficulty keeping your eyes open / nodding off   * Missing traffic signs, speeding, or tailgating  Prevention-   Good sleep hygiene, lifestyle and behavioral choices have the most impact on drowsy driving. There is no substitute for sleep and the average person requires 8 hours nightly. If you find yourself driving drowsy, stop and sleep. Consider the sleep hygiene tips provided during your visit as well. Medication Refill Policy: Refills for all medications require 1 week advance notice. Please have your pharmacy fax a refill request. We are unable to fax, or call in \"controled substance\" medications and you will need to pick these prescriptions up from our office. [employer] Activation    Thank you for requesting access to [employer]. Please follow the instructions below to securely access and download your online medical record. [employer] allows you to send messages to your doctor, view your test results, renew your prescriptions, schedule appointments, and more. How Do I Sign Up? 1. In your internet browser, go to [email or web address] [email or web address]. 2. Click on the First Time User? Click Here link in the Sign In box. You will see the New Member Sign Up page. 3. Enter your [employer] Access Code exactly as it appears below. You will not need to use this code after youve completed the sign-up process. If you do not sign up before the expiration date, you must request a new code.     [employer] Access Code: [email or web address]  Expires: 7/29/2022  9:10 AM (This is the date your [facility] access code will )    4. Enter the last four digits of your Social Security Number (xxxx) and Date of Birth (mm/dd/yyyy) as indicated and click Submit. You will be taken to the next sign-up page. 5. Create a [employer] ID. This will be your Open [facility] login ID and cannot be changed, so think of one that is secure and easy to remember. 6. Create a [employer] [facility] password. You can change your password at any time. 7. Enter your Password Reset Question and Answer. This can be used at a later time if you forget your password. 8. Enter your e-mail address. You will receive e-mail notification when new information is available in [address] 9. Click Sign Up. You can now view and download portions of your medical record. 10. Click the Download Summary menu link to download a portable copy of your medical information. Additional Information    If you have questions, please call [phone]. Remember, [employer] [facility] is NOT to be used for urgent needs. For medical emergencies, dial 911.

## 2022-06-21 ENCOUNTER — DOCUMENTATION ONLY (OUTPATIENT)
Dept: SLEEP MEDICINE | Age: 77
End: 2022-06-21

## 2022-06-21 ENCOUNTER — HOSPITAL ENCOUNTER (OUTPATIENT)
Dept: SLEEP MEDICINE | Age: 77
Discharge: HOME | End: 2022-06-21
Payer: MEDICARE

## 2022-06-21 ENCOUNTER — TELEPHONE (OUTPATIENT)
Dept: SLEEP MEDICINE | Age: 77
End: 2022-06-21

## 2022-06-21 VITALS
HEIGHT: 66 IN | OXYGEN SATURATION: 99 % | HEART RATE: 70 BPM | DIASTOLIC BLOOD PRESSURE: 81 MMHG | BODY MASS INDEX: 27 KG/M2 | TEMPERATURE: 97.8 F | WEIGHT: 168 LBS | SYSTOLIC BLOOD PRESSURE: 163 MMHG

## 2022-06-21 DIAGNOSIS — G47.33 OBSTRUCTIVE SLEEP APNEA (ADULT) (PEDIATRIC): ICD-10-CM

## 2022-06-21 DIAGNOSIS — G47.52 REM SLEEP BEHAVIOR DISORDER: ICD-10-CM

## 2022-06-21 PROCEDURE — 95810 POLYSOM 6/> YRS 4/> PARAM: CPT | Performed by: INTERNAL MEDICINE

## 2022-06-22 NOTE — PROGRESS NOTES
[address] [facility] [address] [place]. [place], [address]  Tel.  [phone]  Fax. 100 [facility] 60  [address] [facility], [address]  Tel.  [phone]  Fax. [phone] [address] [facility] El[place]  Tel.  [phone]  Fax. [phone]     Sleep Study Technical Notes        PRE-Test:  · Moncho Potter (: 1945) arrived on time. Vitals Taken: temperature (97.8 )  BP (163/81) SpO2 (99%) HR (70). She was shown directly to her room. Paperwork completed. She is here for a PSG study. Procedure was explained, questions were answered and she expressed understanding. Electrodes were applied without incident. two extra pillows were provided. Once settled in bed, the study was started. Acquisition Notes:  · Lights off: 9:55pm  · Sleep onset: 10:39pm  · Respiratory events: hypopnea  · ECG:  NSR, PVCs  · Other comments:   · The patient has an adjustable bed frame at home and sleeps elevated. · In Epoch 841 while in REM, she was growling, laughing and talking in her sleep. · Two bathroom breaks. · When getting up for the second bathroom break after REM she recalled a dream playing with her grandson and growling/laughing with him. She says she talks in her sleep about three times a week. POST Test:  Patient awakened. Electrodes were removed. Post Sleep Questionnaire completed. Patient stated that she was alert and ok to drive. Equipment and room cleaned per infection control policy.

## 2022-06-22 NOTE — TELEPHONE ENCOUNTER
Results of sleep study in [facility]  Lead tech to convey results to patient      PSG was negative for significant apnea. AHI was 0.4/hour, lowest oxygen saturation was 94%. Therefore, PAP therapy is not indicated at this time. mild snoring was noted. she fell asleep in 44 minutes. she appeared to be somewhat restless. Sleep efficiency was 70%. All stages of sleep seen including deep and REM were seen. She did have an episode of sleep talking in REM sleep but no unusual movements. She should ensure a safe-sleeping environment, keeping the floor and side tables clear. Sleeping in the middle of the bed or bedrails to prevent her from getting out of the bed. I encourage her to avoid watching tv before bed (she should especially avoid stressful or violent type of tv)     she should ensure  a regular sleep wake cycle. she  should avoid looking at the clock during the night. A regular exercise schedule, at least 3 hours before bedtime, would be beneficial to improving sleep quality  Watching TV, using laptops, tablets and smartphones in the evening is discouraged. she  should  keep the bedroom cool and dark.

## 2022-06-23 ENCOUNTER — DOCUMENTATION ONLY (OUTPATIENT)
Dept: SLEEP MEDICINE | Age: 77
End: 2022-06-23

## 2022-06-29 RX ORDER — FUROSEMIDE 20 MG/1
TABLET ORAL
Qty: 90 TABLET | Refills: 3 | Status: SHIPPED | OUTPATIENT
Start: 2022-06-29

## 2022-06-29 NOTE — TELEPHONE ENCOUNTER
Patient states she needs refill done thru [facility]//[facility] on file//indicated. Please call if any questions.  Thank you

## 2022-06-29 NOTE — TELEPHONE ENCOUNTER
PCP: Kathryn Adorno MD    Last appt: 4/8/2022  Future Appointments   Date Time Provider Alvina Dixon   8/2/2022  9:45 AM Kathryn Adorno MD [facility] BS AMB       Requested Prescriptions     Pending Prescriptions Disp Refills    furosemide (LASIX) 20 mg tablet 90 Tablet 3     Sig: TAKE 1 TABLET BY MOUTH DAILY       Prior labs and Blood pressures:  BP Readings from Last 3 Encounters:   06/21/22 (!) 163/81   06/14/22 (!) 147/75   04/18/22 125/70     Lab Results   Component Value Date/Time    Sodium 139 07/22/2021 02:18 PM    Potassium 3.9 07/22/2021 02:18 PM    Chloride 105 07/22/2021 02:18 PM    CO2 27 07/22/2021 02:18 PM    Anion gap 7 07/22/2021 02:18 PM    Glucose 98 07/22/2021 02:18 PM    BUN 19 07/22/2021 02:18 PM    Creatinine 1.33 (H) 07/22/2021 02:18 PM    BUN/Creatinine ratio 14 07/22/2021 02:18 PM    GFR est AA 47 (L) 07/22/2021 02:18 PM    GFR est non-AA 39 (L) 07/22/2021 02:18 PM    Calcium 9.8 07/22/2021 02:18 PM     No results found for: HBA1C, GIQ6PDIP, GUL8UBDE  Lab Results   Component Value Date/Time    Cholesterol, total 213 (H) 07/22/2021 02:18 PM    HDL Cholesterol 84 07/22/2021 02:18 PM    LDL, calculated 105.8 (H) 07/22/2021 02:18 PM    VLDL, calculated 23.2 07/22/2021 02:18 PM    Triglyceride 116 07/22/2021 02:18 PM    CHOL/HDL Ratio 2.5 07/22/2021 02:18 PM     Lab Results   Component Value Date/Time    VITAMIN D, 25-HYDROXY 36.6 10/08/2019 10:23 AM       Lab Results   Component Value Date/Time    TSH 2.98 07/22/2021 02:18 PM

## 2022-07-13 ENCOUNTER — TELEPHONE (OUTPATIENT)
Dept: INTERNAL MEDICINE CLINIC | Age: 77
End: 2022-07-13

## 2022-07-13 NOTE — TELEPHONE ENCOUNTER
----- Message from Renetta Perkins sent at 7/13/2022 11:15 AM EDT -----  Subject: Appointment Request    Reason for Call: Established Patient Appointment needed: Routine Medicare   AWV    QUESTIONS    Reason for appointment request? No appointments available during search     Additional Information for Provider? Pt is calling in to reschedule her   AWV on 08/02/22. No other appointments available.  Please advise.   ---------------------------------------------------------------------------  --------------  Mathieu MEZA  [phone]; OK to leave message on voicemail  ---------------------------------------------------------------------------  --------------  SCRIPT ANSWERS  COVID Screen: Michael Quintana

## 2022-07-22 RX ORDER — NIFEDIPINE 60 MG/1
60 TABLET, EXTENDED RELEASE ORAL DAILY
Qty: 90 TABLET | Refills: 1 | Status: SHIPPED | OUTPATIENT
Start: 2022-07-22 | End: 2022-09-28

## 2022-07-22 NOTE — TELEPHONE ENCOUNTER
PCP: Keyon Sutherland MD    Last appt: 4/8/2022  Future Appointments   Date Time Provider Alvina Dixon   8/2/2022  9:45 AM Keyon Sutherland MD [facility] BS AMB       Requested Prescriptions     Pending Prescriptions Disp Refills    NIFEdipine ER (ADALAT CC) 60 mg ER tablet 90 Tablet 1     Sig: Take 1 Tablet by mouth in the morning.

## 2022-08-01 ENCOUNTER — TELEPHONE (OUTPATIENT)
Dept: INTERNAL MEDICINE CLINIC | Age: 77
End: 2022-08-01

## 2022-08-01 NOTE — TELEPHONE ENCOUNTER
----- Message from Nancy Varma sent at 8/1/2022  3:14 PM EDT -----  Subject: Appointment Request    Reason for Call: Established Patient Appointment needed: Routine Medicare   AWV    QUESTIONS    Reason for appointment request? No appointments available during search     Additional Information for Provider? patient wanted to reschedule AWV on   08/02/22 and could not find another appointment  ---------------------------------------------------------------------------  --------------  [address] [email or web address]  [phone]; OK to leave message on voicemail  ---------------------------------------------------------------------------  --------------  SCRIPT ANSWERS  COVID Screen: Laura Pinzon

## 2022-08-02 NOTE — TELEPHONE ENCOUNTER
Patient has been rescheduled for the next available appointment on 12/29/22. Patient has also been added to wait list in [address] [place] [address].

## 2022-08-02 NOTE — TELEPHONE ENCOUNTER
----- Message from Carlylechrystal Dylon sent at 8/2/2022 10:46 AM EDT -----  Subject: Appointment Request    Reason for Call: Established Patient Appointment needed: Routine Medicare   AWV    QUESTIONS    Reason for appointment request? No appointments available during search     Additional Information for Provider? Patient called back in to check to   see if she was schedule for her awv however there are no appointments   available . Please reach out to patient in this regards .   ---------------------------------------------------------------------------  --------------  Madhu Majano INFO  [phone]; OK to leave message on voicemail  ---------------------------------------------------------------------------  --------------  SCRIPT ANSWERS  COVID Screen: Arbutus Jordan

## 2022-09-28 RX ORDER — NIFEDIPINE 60 MG/1
60 TABLET, EXTENDED RELEASE ORAL DAILY
Qty: 90 TABLET | Refills: 1 | Status: SHIPPED | OUTPATIENT
Start: 2022-09-28

## 2022-11-22 ENCOUNTER — TELEPHONE (OUTPATIENT)
Dept: INTERNAL MEDICINE CLINIC | Age: 77
End: 2022-11-22

## 2022-11-22 RX ORDER — DOXYCYCLINE 100 MG/1
100 TABLET ORAL 2 TIMES DAILY
Qty: 14 TABLET | Refills: 0 | Status: SHIPPED | OUTPATIENT
Start: 2022-11-22

## 2022-11-22 NOTE — TELEPHONE ENCOUNTER
#[phone]  pt states that for two days she has had cold symptoms. She has sweats, cough with clear phlegm, body aches and a lot of mucus. Pt states that Tussin is not working. Pt just feels really bad she states. Pt can not do a VV as she doesn't know how to operate the cell without help. Pt is asking for an antibiotic to be called in. I did offer VV.

## 2022-12-08 ENCOUNTER — HOSPITAL ENCOUNTER (EMERGENCY)
Age: 77
Discharge: HOME | End: 2022-12-08
Attending: EMERGENCY MEDICINE
Payer: MEDICARE

## 2022-12-08 ENCOUNTER — APPOINTMENT (OUTPATIENT)
Dept: GENERAL RADIOLOGY | Age: 77
End: 2022-12-08
Attending: PHYSICIAN ASSISTANT
Payer: MEDICARE

## 2022-12-08 ENCOUNTER — TELEPHONE (OUTPATIENT)
Dept: INTERNAL MEDICINE CLINIC | Age: 77
End: 2022-12-08

## 2022-12-08 VITALS
TEMPERATURE: 98.2 F | HEIGHT: 67 IN | DIASTOLIC BLOOD PRESSURE: 80 MMHG | HEART RATE: 81 BPM | WEIGHT: 170 LBS | SYSTOLIC BLOOD PRESSURE: 129 MMHG | RESPIRATION RATE: 14 BRPM | OXYGEN SATURATION: 100 % | BODY MASS INDEX: 26.68 KG/M2

## 2022-12-08 DIAGNOSIS — I49.3 PVC (PREMATURE VENTRICULAR CONTRACTION): ICD-10-CM

## 2022-12-08 DIAGNOSIS — J20.8 ACUTE VIRAL BRONCHITIS: Primary | ICD-10-CM

## 2022-12-08 DIAGNOSIS — Z87.09 HISTORY OF ASTHMA: ICD-10-CM

## 2022-12-08 LAB
FLUAV AG NPH QL IA: NEGATIVE
FLUBV AG NOSE QL IA: NEGATIVE

## 2022-12-08 PROCEDURE — 93005 ELECTROCARDIOGRAM TRACING: CPT

## 2022-12-08 PROCEDURE — 87804 INFLUENZA ASSAY W/OPTIC: CPT

## 2022-12-08 PROCEDURE — 71046 X-RAY EXAM CHEST 2 VIEWS: CPT

## 2022-12-08 PROCEDURE — 99285 EMERGENCY DEPT VISIT HI MDM: CPT

## 2022-12-08 RX ORDER — PREDNISONE 10 MG/1
TABLET ORAL
Qty: 21 TABLET | Refills: 0 | Status: SHIPPED | OUTPATIENT
Start: 2022-12-08

## 2022-12-08 RX ORDER — DEXTROMETHORPHAN HYDROBROMIDE, GUAIFENESIN 20; 400 MG/20ML; MG/20ML
20 SOLUTION ORAL
Qty: 118 ML | Refills: 0 | Status: SHIPPED | OUTPATIENT
Start: 2022-12-08

## 2022-12-08 RX ORDER — ALBUTEROL SULFATE 90 UG/1
2 AEROSOL, METERED RESPIRATORY (INHALATION)
Qty: 1 EACH | Refills: 0 | Status: SHIPPED | OUTPATIENT
Start: 2022-12-08

## 2022-12-08 NOTE — TELEPHONE ENCOUNTER
Triage call answered. Patient states that she has been feeling dizzy and faint today, she has drainage and cold symptoms that she has seen PCP for and is on medications. Patient states that the dizziness and faint feeling has not improved, and now feels like shes going to pass out when she stands up. Informed patient per ANDREA smith and Dr. Danika Mason patient should go to ER due to symptoms of feeling off passing out and dizziness.

## 2022-12-08 NOTE — ED PROVIDER NOTES
EMERGENCY DEPARTMENT HISTORY AND PHYSICAL EXAM      Please note that this dictation was completed with [employer], the computer voice recognition software. Quite often unanticipated grammatical, syntax, homophones, and other interpretive errors are inadvertently transcribed by the computer software. Please disregard these errors. Please excuse any errors that have escaped final proofreading. Date: 12/8/2022  Patient Name: Silas Rizo    History of Presenting Illness     Chief Complaint   Patient presents with    Dizziness     Pt has been having cold like symptoms since 11/20, she went to her pcp and was given doxycycline. She states she finished that and felt a bit better but then this morning she developed a nasal drip and is occasionally having some dizziness. Denies n/v/d chest pain or sob       History Provided By: Patient    HPI: Silas Rizo, 65 y.o. female with past medical history of asthma, HTN, HLD, and GERD presents ambulatory with daughter to the ED with cc of constant and moderate productive cough for 3 weeks. She finished a course of doxycycline last week as prescribed by her PCP and felt better, however, this morning her cough and congestion resumed and she felt dizzy following a coughing-fit. She was concerned due to a distant history of a syncopal episode and wanted to be evaluated. She is vaccinated against COVID, but not influenza. As me she had a negative home COVID test.  She has no recent sick contacts. She has not had lasting relief with tylenol or OTC cough suppressants. She has had some relief using her beclomethasone inhaler. She denies fevers, chills, ear pain, eye pain, or sore throat. Denies chest pain, shortness of breath, abdominal pain, nausea, vomiting, or diarrhea. There are no other complaints, changes, or physical findings at this time.     PCP: John Jerez MD    Current Outpatient Medications   Medication Sig Dispense Refill    predniSONE (STERAPRED DS) 10 mg dose pack Per Dose Pack instructions 21 Tablet 0    albuterol (PROVENTIL HFA, VENTOLIN HFA, PROAIR HFA) 90 mcg/actuation inhaler Take 2 Puffs by inhalation every four (4) hours as needed for Wheezing. 1 Each 0    dextromethorphan-guaiFENesin (Robitussin Cough-Chest Jas DM) 5-100 mg/5 mL liqd Take 20 mL by mouth four (4) times daily as needed for Cough or Congestion. 118 mL 0    doxycycline (ADOXA) 100 mg tablet Take 1 Tablet by mouth two (2) times a day. 14 Tablet 0    NIFEdipine ER (ADALAT CC) 60 mg ER tablet TAKE 1 TABLET BY MOUTH IN THE MORNING 90 Tablet 1    furosemide (LASIX) 20 mg tablet TAKE 1 TABLET BY MOUTH DAILY 90 Tablet 3    rosuvastatin (CRESTOR) 5 mg tablet TAKE 1 TABLET BY MOUTH DAILY 90 Tablet 3    beclomethasone (Qvar) 80 mcg/actuation aero INHALE 1 PUFF BY MOUTH ONCE DAILY AS NEEDED 1 Inhaler 5    potassium chloride SR (KLOR-CON 10) 10 mEq tablet TAKE 1 TABLET BY MOUTH TWICE DAILY 60 Tab 6    Biotin 2,500 mcg cap Take  by mouth. ubidecarenone/vitamin E mixed (COQ10  PO) Take  by mouth daily. cholecalciferol (VITAMIN D3) 1,000 unit tablet Take  by mouth daily. acetaminophen (TYLENOL) 500 mg tablet Take 500 mg by mouth every six (6) hours as needed for Pain. aspirin delayed-release 81 mg tablet Take 81 mg by mouth daily.  Indications: PT states stopping 7/22-7/26 for Eye Surgery       Past History     Past Medical History:  Past Medical History:   Diagnosis Date    Arthritis     hands    Asthma     Essential hypertension     GERD (gastroesophageal reflux disease)     High cholesterol     HTN (hypertension)     Ill-defined condition     hyperlipidemia    Left ventricular hypertrophy due to hypertensive disease     Murmur     Renal artery stenosis ([address]     Shingles 08/11/2018    Syncope        Past Surgical History:  Past Surgical History:   Procedure Laterality Date    HX BREAST BIOPSY Left 20 years ago    negative    HX CATARACT REMOVAL  07/2018    right eye    HX GI open inquinal hernia repair    HX GI      6/29/18:  pt reports mulitple abdominal surgeries but unable to recall exact types    HX GYN      tubal ligation    HX HEART CATHETERIZATION  2000    normal    HX UROLOGICAL      Bilateral Renal stents. Family History:  Family History   Problem Relation Age of Onset    Cancer Mother     Heart Disease Mother     Heart Disease Father     Hypertension Father     Lung Disease Father     Hypertension Brother     Diabetes Brother     Heart Disease Brother     Kidney Disease Brother     Hypertension Brother        Social History:  Social History     Tobacco Use    Smoking status: Never    Smokeless tobacco: Never   Substance Use Topics    Alcohol use: No     Comment: Drinks Lite Beer    Drug use: Yes     Types: Prescription, OTC     Comment: perocet       Allergies: Allergies   Allergen Reactions    Amlodipine Other (comments)     Itching,  Muscle cramps    Clonidine Swelling    Ibuprofen Swelling    Levaquin [Levofloxacin] Unknown (comments)    Lisinopril Angioedema    Other Medication Rash and Itching     Patient reports she is allergic to the PPD test for TB    Pcn [Penicillins] Swelling    Pravastatin Myalgia    Sulfa (Sulfonamide Antibiotics) Hives     Review of Systems   Review of Systems   Constitutional:  Negative for chills and fever. HENT:  Positive for congestion and rhinorrhea. Negative for ear pain and sore throat. Eyes:  Negative for pain. Respiratory:  Positive for cough. Negative for shortness of breath. Cardiovascular:  Negative for chest pain. Gastrointestinal:  Negative for abdominal pain, diarrhea, nausea and vomiting. Genitourinary:  Negative for flank pain. Musculoskeletal:  Negative for myalgias. Skin:  Negative for rash. Neurological:  Positive for dizziness. Negative for weakness and headaches. Physical Exam   Physical Exam  Vitals and nursing note reviewed. Constitutional:       Appearance: Normal appearance.    HENT: Head: Normocephalic and atraumatic. Right Ear: Tympanic membrane normal.      Left Ear: Tympanic membrane normal.      Nose: Congestion and rhinorrhea present. Mouth/Throat:      Mouth: Mucous membranes are moist.      Pharynx: Oropharynx is clear. No oropharyngeal exudate or posterior oropharyngeal erythema. Eyes:      Conjunctiva/sclera: Conjunctivae normal.      Pupils: Pupils are equal, round, and reactive to light. Cardiovascular:      Rate and Rhythm: Normal rate and regular rhythm. Comments: Bilateral trace pitting edema of ankles   Pulmonary:      Effort: Pulmonary effort is normal.      Breath sounds: Normal breath sounds. Abdominal:      Palpations: Abdomen is soft. Tenderness: There is no abdominal tenderness. Musculoskeletal:         General: No tenderness. Normal range of motion. Cervical back: Normal range of motion and neck supple. No tenderness. Skin:     General: Skin is dry. Capillary Refill: Capillary refill takes less than 2 seconds. Neurological:      Mental Status: She is alert and oriented to person, place, and time.      Diagnostic Study Results     Labs -     Recent Results (from the past 12 hour(s))   EKG, 12 LEAD, INITIAL    Collection Time: 12/08/22  5:06 PM   Result Value Ref Range    Ventricular Rate 66 BPM    Atrial Rate 66 BPM    P-R Interval 202 ms    QRS Duration 72 ms    Q-T Interval 430 ms    QTC Calculation (Bezet) 450 ms    Calculated P Axis 71 degrees    Calculated R Axis 20 degrees    Calculated T Axis 49 degrees    Diagnosis       Sinus rhythm with occasional premature ventricular complexes  When compared with ECG of 11-AUG-2018 09:02,  premature ventricular complexes are now present     INFLUENZA A+B VIRAL AGS    Collection Time: 12/08/22  5:10 PM   Result Value Ref Range    Influenza A Antigen Negative NEG      Influenza B Antigen Negative NEG         Radiologic Studies -   XR CHEST PA LAT   Final Result   No acute cardiopulmonary disease. CT Results  (Last 48 hours)      None          CXR Results  (Last 48 hours)                 12/08/22 1705  XR CHEST PA LAT Final result    Impression:  No acute cardiopulmonary disease. Narrative: Indication:  productive cough; negative COVID        Exam: PA and lateral views of the chest.       Direct comparison is made to prior CXR dated 8/2018       Findings: Cardiomediastinal silhouette is within normal limits. Lungs are clear   bilaterally. Pleural spaces are normal. Osseous structures are intact. Medical Decision Making   I am the first provider for this patient. I reviewed the vital signs, available nursing notes, past medical history, past surgical history, family history and social history. Vital Signs-Reviewed the patient's vital signs. Patient Vitals for the past 12 hrs:   Temp Pulse Resp BP SpO2   12/08/22 1447 98.2 °F (36.8 °C) 81 14 129/80 100 %       Pulse Oximetry Analysis - 100% on RA    Records Reviewed: Nursing Notes and Old Medical Records    Provider Notes (Medical Decision Making):   DDx: Influenza, COVID, pneumonia, bronchitis, URI     Afebrile and well-appearing. Breathing is unlabored and lungs are clear to auscultation throughout. Testing for influenza is negative. ECG is nonischemic and reflects PVCs for which she has a history and is followed by cardiology. Case discussed with Dr. Alyce Hopkins. Additional testing deferred. Mentation is most consistent with a viral bronchitis. Given her history of asthma, believe reasonable to place on a course of oral steroids and offer albuterol. Will offer Robitussin for cough symptoms. Refer to primary care. Return precautions for worsening symptoms or any concerns. ED Course:   Initial assessment performed. The patients presenting problems have been discussed, and they are in agreement with the care plan formulated and outlined with them.   I have encouraged them to ask questions as they arise throughout their visit. Disposition:  Discharge    PLAN:  1. Discharge Medication List as of 12/8/2022  5:55 PM        START taking these medications    Details   predniSONE (STERAPRED DS) 10 mg dose pack Per Dose Pack instructions, Normal, Disp-21 Tablet, R-0      albuterol (PROVENTIL HFA, VENTOLIN HFA, PROAIR HFA) 90 mcg/actuation inhaler Take 2 Puffs by inhalation every four (4) hours as needed for Wheezing., Normal, Disp-1 Each, R-0      dextromethorphan-guaiFENesin (Robitussin Cough-Chest Jas DM) 5-100 mg/5 mL liqd Take 20 mL by mouth four (4) times daily as needed for Cough or Congestion. , Normal, Disp-118 mL, R-0           CONTINUE these medications which have NOT CHANGED    Details   doxycycline (ADOXA) 100 mg tablet Take 1 Tablet by mouth two (2) times a day., Normal, Disp-14 Tablet, R-0      NIFEdipine ER (ADALAT CC) 60 mg ER tablet TAKE 1 TABLET BY MOUTH IN THE MORNING, Normal, Disp-90 Tablet, R-1      furosemide (LASIX) 20 mg tablet TAKE 1 TABLET BY MOUTH DAILY, Normal, Disp-90 Tablet, R-3      rosuvastatin (CRESTOR) 5 mg tablet TAKE 1 TABLET BY MOUTH DAILY, Normal, Disp-90 Tablet, R-3      beclomethasone (Qvar) 80 mcg/actuation aero INHALE 1 PUFF BY MOUTH ONCE DAILY AS NEEDED, Normal, Disp-1 Inhaler, R-5      potassium chloride SR (KLOR-CON 10) 10 mEq tablet TAKE 1 TABLET BY MOUTH TWICE DAILY, Normal, Disp-60 Tab,R-6      Biotin 2,500 mcg cap Take  by mouth., Historical Med      ubidecarenone/vitamin E mixed (COQ10  PO) Take  by mouth daily. , Historical Med      cholecalciferol (VITAMIN D3) 1,000 unit tablet Take  by mouth daily. , Historical Med      acetaminophen (TYLENOL) 500 mg tablet Take 500 mg by mouth every six (6) hours as needed for Pain., Historical Med      aspirin delayed-release 81 mg tablet Take 81 mg by mouth daily. Indications: PT states stopping 7/22-7/26 for Eye Surgery, Historical Med           2.    Follow-up Information       Follow up With Specialties Details Why Contact Info    Elham Cruz MD Internal Medicine Physician Call  PRIMARY CARE: call to schedule follow up [id number] [facility]  [phone]            Return to ED if worse     Diagnosis     Clinical Impression:   1. Acute viral bronchitis    2. History of asthma    3.  PVC (premature ventricular contraction)

## 2022-12-09 LAB
ATRIAL RATE: 66 BPM
CALCULATED P AXIS, ECG09: 71 DEGREES
CALCULATED R AXIS, ECG10: 20 DEGREES
CALCULATED T AXIS, ECG11: 49 DEGREES
DIAGNOSIS, 93000: NORMAL
P-R INTERVAL, ECG05: 202 MS
Q-T INTERVAL, ECG07: 430 MS
QRS DURATION, ECG06: 72 MS
QTC CALCULATION (BEZET), ECG08: 450 MS
VENTRICULAR RATE, ECG03: 66 BPM

## 2022-12-28 NOTE — PROGRESS NOTES
HISTORY OF PRESENT ILLNESS  Bebo Do is a 65 y.o. female.   HPI  F/u HTN , s/p bl renal artery stents for KALPESH HLD postherpetic neuralgia. leg edema ckd 3 low vit d, mild right carotid carotid artery stenosis, osteopenia with low FRAX  hx pagets and medicare wellness---  Had sleep study-no sig norma  Saw CARD M D-pt deferred ablation and PM for PVC unable to use AVN blocking medications  Saw Rheum Dr Jose Blanco for osteoporosis and pagets--Cr 1.1 on labs in June-no medication was started -due to dentures she was to d/w dentist prior starting any medication since she may need partial dentures  Has ckd 3-Dr Villarreal--borderline labs 4-5 month ago  Denies any  bone pain  C/o cough x 4-5 weeks productive but improving--covid neg per pt at home  No cp or sob   Last OV  Had declinesd fosamax last yaer --osteoprorosis of forearms, normal spine , osteopejia of hip t-1.0 on last dexa---has ckd 3- Dr Lis Andrade  Denies bone pain but has hand OA pain  Lost  last December, lost Sisster in law recently too  Has a lot of family to check on her and managing ok    Patient Active Problem List    Diagnosis Date Noted    Essential hypertension, malignant 04/26/2015    Early satiety 08/13/2019    CKD (chronic kidney disease) stage 3, GFR 30-59 ml/min ([facility] [address] 08/13/2019    Osteopenia of hip 04/22/2019    Vitamin D deficiency 04/12/2019    Syncope and collapse 09/30/2018    Syncope due to orthostatic hypotension 09/30/2018    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    Carotid bruit present 12/05/2017    Bilateral carotid artery stenosis 12/05/2017    History of tubal ligation 09/14/2015    Bilateral renal artery stenosis (HCC)--s/p PTA/stenting 7/10/15 [facility] 08/09/2015    Stress at home 06/13/2015    H/O gastroesophageal reflux (GERD) 03/12/2015    Obesity 09/04/2013    Edema of both legs--chronic venous insufficiency,amlodipine 09/04/2013    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 1 Each 0    dextromethorphan-guaiFENesin (Robitussin Cough-Chest Jas DM) 5-100 mg/5 mL liqd Take 20 mL by mouth four (4) times daily as needed for Cough or Congestion. 118 mL 0    doxycycline (ADOXA) 100 mg tablet Take 1 Tablet by mouth two (2) times a day. 14 Tablet 0    NIFEdipine ER (ADALAT CC) 60 mg ER tablet TAKE 1 TABLET BY MOUTH IN THE MORNING 90 Tablet 1    furosemide (LASIX) 20 mg tablet TAKE 1 TABLET BY MOUTH DAILY 90 Tablet 3    rosuvastatin (CRESTOR) 5 mg tablet TAKE 1 TABLET BY MOUTH DAILY 90 Tablet 3    beclomethasone (Qvar) 80 mcg/actuation aero INHALE 1 PUFF BY MOUTH ONCE DAILY AS NEEDED 1 Inhaler 5    potassium chloride SR (KLOR-CON 10) 10 mEq tablet TAKE 1 TABLET BY MOUTH TWICE DAILY 60 Tab 6    Biotin 2,500 mcg cap Take  by mouth. ubidecarenone/vitamin E mixed (COQ10  PO) Take  by mouth daily. cholecalciferol (VITAMIN D3) 1,000 unit tablet Take  by mouth daily. acetaminophen (TYLENOL) 500 mg tablet Take 500 mg by mouth every six (6) hours as needed for Pain. aspirin delayed-release 81 mg tablet Take 81 mg by mouth daily.  Indications: PT states stopping 7/22-7/26 for Eye Surgery       Allergies   Allergen Reactions    Amlodipine Other (comments)     Itching,  Muscle cramps    Clonidine Swelling    Ibuprofen Swelling    Levaquin [Levofloxacin] Unknown (comments)    Lisinopril Angioedema    Other Medication Rash and Itching     Patient reports she is allergic to the PPD test for TB    Pcn [Penicillins] Swelling    Pravastatin Myalgia    Sulfa (Sulfonamide Antibiotics) Hives      Lab Results   Component Value Date/Time    WBC 5.8 07/22/2021 02:18 PM    HGB 13.1 07/22/2021 02:18 PM    HCT 39.6 07/22/2021 02:18 PM    PLATELET 583 95/32/3041 02:18 PM    MCV 93.8 07/22/2021 02:18 PM     Lab Results   Component Value Date/Time    Glucose 98 07/22/2021 02:18 PM    Glucose (POC) 96 04/27/2015 09:57 PM    Microalb/Creat ratio (ug/mg creat.) 62.5 (H) 04/02/2015 09:06 AM    LDL, calculated 105.8 (H) 07/22/2021 02:18 PM    Creatinine (POC) 1.1 10/09/2019 09:56 AM    Creatinine 1.33 (H) 07/22/2021 02:18 PM      Lab Results   Component Value Date/Time    Cholesterol, total 213 (H) 07/22/2021 02:18 PM    HDL Cholesterol 84 07/22/2021 02:18 PM    LDL, calculated 105.8 (H) 07/22/2021 02:18 PM    Triglyceride 116 07/22/2021 02:18 PM    CHOL/HDL Ratio 2.5 07/22/2021 02:18 PM     Lab Results   Component Value Date/Time    GFR est non-AA 39 (L) 07/22/2021 02:18 PM    GFRNA, POC 48 (L) 10/09/2019 09:56 AM    GFR est AA 47 (L) 07/22/2021 02:18 PM    GFRAA, POC 59 (L) 10/09/2019 09:56 AM    Creatinine 1.33 (H) 07/22/2021 02:18 PM    Creatinine (POC) 1.1 10/09/2019 09:56 AM    BUN 19 07/22/2021 02:18 PM    Sodium 139 07/22/2021 02:18 PM    Potassium 3.9 07/22/2021 02:18 PM    Chloride 105 07/22/2021 02:18 PM    CO2 27 07/22/2021 02:18 PM    Magnesium 1.7 02/07/2010 03:30 AM     Lab Results   Component Value Date/Time    TSH 2.98 07/22/2021 02:18 PM         ROS    Physical Exam  Vitals and nursing note reviewed. Constitutional:       Appearance: Normal appearance. She is well-developed. Comments: Appears stated age   Neck:      Vascular: Carotid bruit present. Comments: Right carotid bruit  Cardiovascular:      Rate and Rhythm: Normal rate and regular rhythm. Heart sounds: Murmur heard. No friction rub. No gallop. Pulmonary:      Effort: Pulmonary effort is normal. No respiratory distress. Breath sounds: Normal breath sounds. No wheezing. Abdominal:      General: Bowel sounds are normal.      Palpations: Abdomen is soft. Musculoskeletal:      Cervical back: Normal range of motion. Skin:     General: Skin is warm. Neurological:      Mental Status: She is alert. ASSESSMENT and PLAN    ICD-10-CM ICD-9-CM    1. Hypertension, unspecified type  A85 371.0 METABOLIC PANEL, BASIC  controlled      2. Hyperlipidemia, unspecified hyperlipidemia type  E78.5 272.4 LIPID PANEL  On statin      3. Stage 3 chronic kidney disease, unspecified whether stage 3a or 3b CKD (HCC)  R09.08 102.2 METABOLIC PANEL, BASIC  Fu Dr Nguyen Nurse      4. Osteopenia, unspecified location  M85.80 733.90 Fu RHeum -pt will also d/w dentist about starting medication      5. Paget's bone disease  Z25.8 139.4 METABOLIC PANEL, Whitney Barnes MD      6. Cough, unspecified type  R05.9 786.2 beclomethasone dipropionate (Qvar RediHaler) 80 mcg/actuation HFAb inhaler      7. Heart murmur  R01.1 785. 2 Fu CARD      8        Carotid stenosis--mild-duplex next year   This is the Subsequent Medicare Annual Wellness Exam, performed 12 months or more after the Initial AWV or the last Subsequent AWV    I have reviewed the patient's medical history in detail and updated the computerized patient record. Assessment/Plan   Education and counseling provided:  Are appropriate based on today's review and evaluation  End-of-Life planning (with patient's consent)  Flu shot recommended    1. Hypertension, unspecified type  -     METABOLIC PANEL, BASIC; Future  2. Hyperlipidemia, unspecified hyperlipidemia type  -     LIPID PANEL; Future  3. Stage 3 chronic kidney disease, unspecified whether stage 3a or 3b CKD (HCC)  -     METABOLIC PANEL, BASIC; Future  4. Osteopenia, unspecified location  5. Paget's bone disease  -     METABOLIC PANEL, BASIC; Future  6.  Cough, unspecified type  The following orders have not been finalized:  -     beclomethasone dipropionate (Qvar RediHaler) 80 mcg/actuation HFAb inhaler       Depression Risk Factor Screening     3 most recent PHQ Screens 12/29/2022 Little interest or pleasure in doing things Not at all   Feeling down, depressed, irritable, or hopeless Not at all   Total Score PHQ 2 0       Alcohol & Drug Abuse Risk Screen    Do you average more than 1 drink per night or more than 7 drinks a week:  No    On any one occasion in the past three months have you have had more than 3 drinks containing alcohol:  No          Functional Ability and Level of Safety    Hearing: Hearing is good. Activities of Daily Living: The home contains: handrails and grab bars  Patient does total self care      Ambulation: with no difficulty     Fall Risk:  Fall Risk Assessment, last 12 mths 12/29/2022   Able to walk? Yes   Fall in past 12 months? 1   Do you feel unsteady? 1   Are you worried about falling 0   Is the gait abnormal? -   Number of falls in past 12 months 1   Fall with injury?  0      Abuse Screen:  Patient is not abused       Cognitive Screening    Has your family/caregiver stated any concerns about your memory: no     Cognitive Screening: Normal - serial 3    Health Maintenance Due     Health Maintenance Due   Topic Date Due    Lipid Screen  07/22/2022    Medicare Yearly Exam  07/23/2022    Flu Vaccine (1) 08/01/2022       Patient Care Team   Patient Care Team:  Heidy Washington MD as PCP - General (Internal Medicine Physician)  Heidy Washington MD as PCP - [facility] Empaneled Provider  Marquis Nava, ANP as Nurse Practitioner (Nurse Practitioner)  Jovanni White MD (Ophthalmology)  Marilynn Jordan NP as Physician (Nurse Practitioner)    History     Patient Active Problem List   Diagnosis Code    High cholesterol E78.00    Left ventricular hypertrophy due to hypertensive disease I11.9    Obesity E66.9    Edema of both legs--chronic venous insufficiency,amlodipine R60.0    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 R06.83    H/O gastroesophageal reflux (GERD) Z87.19    Essential hypertension, malignant I10    Stress at home F43.9    Bilateral renal artery stenosis ([facility])--s/p PTA/stenting 7/10/15 [facility] I70.1    History of tubal ligation Z98.51    Carotid bruit present R09.89    Bilateral carotid artery stenosis I65.23    Paget's bone disease M88.9    Syncope and collapse R55    Syncope due to orthostatic hypotension I95.1    Vitamin D deficiency E55.9    Osteopenia of hip M85.859    Early satiety R68.81    CKD (chronic kidney disease) stage 3, GFR 30-59 ml/min ([facility]) N18.30     Past Medical History:   Diagnosis Date    Arthritis     hands    Asthma     Essential hypertension     GERD (gastroesophageal reflux disease)     High cholesterol     HTN (hypertension)     Ill-defined condition     hyperlipidemia    Left ventricular hypertrophy due to hypertensive disease     Murmur     Renal artery stenosis ([address]     Shingles 08/11/2018    Syncope       Past Surgical History:   Procedure Laterality Date    HX BREAST BIOPSY Left 20 years ago    negative    HX CATARACT REMOVAL  07/2018    right eye    HX GI      open inquinal hernia repair    HX GI      6/29/18:  pt reports mulitple abdominal surgeries but unable to recall exact types    HX GYN      tubal ligation    HX HEART CATHETERIZATION  2000    normal    HX UROLOGICAL      Bilateral Renal stents. Current Outpatient Medications   Medication Sig Dispense Refill    predniSONE (STERAPRED DS) 10 mg dose pack Per Dose Pack instructions 21 Tablet 0    dextromethorphan-guaiFENesin (Robitussin Cough-Chest Jas DM) 5-100 mg/5 mL liqd Take 20 mL by mouth four (4) times daily as needed for Cough or Congestion.  118 mL 0    NIFEdipine ER (ADALAT CC) 60 mg ER tablet TAKE 1 TABLET BY MOUTH IN THE MORNING 90 Tablet 1    furosemide (LASIX) 20 mg tablet TAKE 1 TABLET BY MOUTH DAILY 90 Tablet 3    rosuvastatin (CRESTOR) 5 mg tablet TAKE 1 TABLET BY MOUTH DAILY 90 Tablet 3    beclomethasone (Qvar) 80 mcg/actuation aero INHALE 1 PUFF BY MOUTH ONCE DAILY AS NEEDED 1 Inhaler 5    potassium chloride SR (KLOR-CON 10) 10 mEq tablet TAKE 1 TABLET BY MOUTH TWICE DAILY 60 Tab 6    Biotin 2,500 mcg cap Take  by mouth. ubidecarenone/vitamin E mixed (COQ10  PO) Take  by mouth daily. cholecalciferol (VITAMIN D3) (1000 Units /25 mcg) tablet Take  by mouth daily. acetaminophen (TYLENOL) 500 mg tablet Take 500 mg by mouth every six (6) hours as needed for Pain. aspirin delayed-release 81 mg tablet Take 81 mg by mouth daily.  Indications: PT states stopping 7/22-7/26 for Eye Surgery       Allergies   Allergen Reactions    Amlodipine Other (comments)     Itching,  Muscle cramps    Clonidine Swelling    Ibuprofen Swelling    Levaquin [Levofloxacin] Unknown (comments)    Lisinopril Angioedema    Other Medication Rash and Itching     Patient reports she is allergic to the PPD test for TB    Pcn [Penicillins] Swelling    Pravastatin Myalgia    Sulfa (Sulfonamide Antibiotics) Hives       Family History   Problem Relation Age of Onset    Cancer Mother     Heart Disease Mother     Heart Disease Father     Hypertension Father     Lung Disease Father     Hypertension Brother     Diabetes Brother     Heart Disease Brother     Kidney Disease Brother     Hypertension Brother      Social History     Tobacco Use    Smoking status: Never    Smokeless tobacco: Never   Substance Use Topics    Alcohol use: No     Comment: Drinks Jamaal Borja MD

## 2022-12-29 ENCOUNTER — OFFICE VISIT (OUTPATIENT)
Dept: INTERNAL MEDICINE CLINIC | Age: 77
End: 2022-12-29
Payer: MEDICARE

## 2022-12-29 VITALS
WEIGHT: 172.4 LBS | BODY MASS INDEX: 27.06 KG/M2 | SYSTOLIC BLOOD PRESSURE: 132 MMHG | HEIGHT: 67 IN | RESPIRATION RATE: 16 BRPM | DIASTOLIC BLOOD PRESSURE: 70 MMHG | OXYGEN SATURATION: 99 % | HEART RATE: 73 BPM | TEMPERATURE: 97.2 F

## 2022-12-29 DIAGNOSIS — R01.1 HEART MURMUR: ICD-10-CM

## 2022-12-29 DIAGNOSIS — M85.80 OSTEOPENIA, UNSPECIFIED LOCATION: ICD-10-CM

## 2022-12-29 DIAGNOSIS — M88.9 PAGET'S BONE DISEASE: ICD-10-CM

## 2022-12-29 DIAGNOSIS — N18.30 STAGE 3 CHRONIC KIDNEY DISEASE, UNSPECIFIED WHETHER STAGE 3A OR 3B CKD (HCC): ICD-10-CM

## 2022-12-29 DIAGNOSIS — E78.5 HYPERLIPIDEMIA, UNSPECIFIED HYPERLIPIDEMIA TYPE: ICD-10-CM

## 2022-12-29 DIAGNOSIS — R05.9 COUGH, UNSPECIFIED TYPE: ICD-10-CM

## 2022-12-29 DIAGNOSIS — I10 HYPERTENSION, UNSPECIFIED TYPE: Primary | ICD-10-CM

## 2022-12-29 LAB
ANION GAP SERPL CALC-SCNC: 5 MMOL/L (ref 5–15)
BUN SERPL-MCNC: 20 MG/DL (ref 6–20)
BUN/CREAT SERPL: 16 (ref 12–20)
CALCIUM SERPL-MCNC: 9.5 MG/DL (ref 8.5–10.1)
CHLORIDE SERPL-SCNC: 106 MMOL/L (ref 97–108)
CHOLEST SERPL-MCNC: 202 MG/DL
CO2 SERPL-SCNC: 28 MMOL/L (ref 21–32)
CREAT SERPL-MCNC: 1.25 MG/DL (ref 0.55–1.02)
GLUCOSE SERPL-MCNC: 104 MG/DL (ref 65–100)
HDLC SERPL-MCNC: 76 MG/DL
HDLC SERPL: 2.7 {RATIO} (ref 0–5)
LDLC SERPL CALC-MCNC: 97.4 MG/DL (ref 0–100)
POTASSIUM SERPL-SCNC: 4.3 MMOL/L (ref 3.5–5.1)
SODIUM SERPL-SCNC: 139 MMOL/L (ref 136–145)
TRIGL SERPL-MCNC: 143 MG/DL (ref ?–150)
VLDLC SERPL CALC-MCNC: 28.6 MG/DL

## 2022-12-29 PROCEDURE — G8399 PT W/DXA RESULTS DOCUMENT: HCPCS | Performed by: INTERNAL MEDICINE

## 2022-12-29 PROCEDURE — G8417 CALC BMI ABV UP PARAM F/U: HCPCS | Performed by: INTERNAL MEDICINE

## 2022-12-29 PROCEDURE — G8427 DOCREV CUR MEDS BY ELIG CLIN: HCPCS | Performed by: INTERNAL MEDICINE

## 2022-12-29 PROCEDURE — G8536 NO DOC ELDER MAL SCRN: HCPCS | Performed by: INTERNAL MEDICINE

## 2022-12-29 PROCEDURE — G0439 PPPS, SUBSEQ VISIT: HCPCS | Performed by: INTERNAL MEDICINE

## 2022-12-29 PROCEDURE — 1101F PT FALLS ASSESS-DOCD LE1/YR: CPT | Performed by: INTERNAL MEDICINE

## 2022-12-29 PROCEDURE — G8510 SCR DEP NEG, NO PLAN REQD: HCPCS | Performed by: INTERNAL MEDICINE

## 2022-12-29 RX ORDER — BECLOMETHASONE DIPROPIONATE HFA 80 UG/1
1 AEROSOL, METERED RESPIRATORY (INHALATION) DAILY
Qty: 10.6 G | Refills: 5 | Status: SHIPPED | OUTPATIENT
Start: 2022-12-29

## 2022-12-29 NOTE — PATIENT INSTRUCTIONS
Medicare Wellness Visit, Female     The best way to live healthy is to have a lifestyle where you eat a well-balanced diet, exercise regularly, limit alcohol use, and quit all forms of tobacco/nicotine, if applicable. Regular preventive services are another way to keep healthy. Preventive services (vaccines, screening tests, monitoring & exams) can help personalize your care plan, which helps you manage your own care. Screening tests can find health problems at the earliest stages, when they are easiest to treat. [facility] follows the current, evidence-based guidelines published by the [facility] ([facility]) when recommending preventive services for our patients. Because we follow these guidelines, sometimes recommendations change over time as research supports it. (For example, mammograms used to be recommended annually. Even though Medicare will still pay for an annual mammogram, the newer guidelines recommend a mammogram every two years for women of average risk). Of course, you and your doctor may decide to screen more often for some diseases, based on your risk and your co-morbidities (chronic disease you are already diagnosed with). Preventive services for you include:  - Medicare offers their members a free annual wellness visit, which is time for you and your primary care provider to discuss and plan for your preventive service needs.  Take advantage of this benefit every year!    -Over the age of 72 should receive the recommended pneumonia vaccines.    -All adults should have a flu vaccine yearly.  -All adults should have a tetanus vaccine every 10 years.   -Over the age 48 should receive the shingles vaccines.        -All adults should be screened once for Hepatitis C.  -All adults age 38-68 who are overweight should have a diabetes screening test once every three years.   -Other screening tests and preventive services for persons with diabetes include: an eye exam to screen for diabetic retinopathy, a kidney function test, a foot exam, and stricter control over your cholesterol.   -Cardiovascular screening for adults with routine risk involves an electrocardiogram (ECG) at intervals determined by your doctor.     -Colorectal cancer screenings should be done for adults age 39-70 with no increased risk factors for colorectal cancer. There are a number of acceptable methods of screening for this type of cancer. Each test has its own benefits and drawbacks. Discuss with your doctor what is most appropriate for you during your annual wellness visit. The different tests include: colonoscopy (considered the best screening method), a fecal occult blood test, a fecal DNA test, and sigmoidoscopy.    -Lung cancer screening is recommended annually with a low dose CT scan for adults between age 54 and 68, who have smoked at least 30 pack years (equivalent of 1 pack per day for 30 days), and who is a current smoker or quit less than 15 years ago.    -A bone mass density test is recommended when a woman turns 65 to screen for osteoporosis. This test is only recommended one time, as a screening. Some providers will use this same test as a disease monitoring tool if you already have osteoporosis. -Breast cancer screenings are recommended every other year for women of normal risk, age 54-69.    -Cervical cancer screenings for women over age 72 are only recommended with certain risk factors.      Here is a list of your current Health Maintenance items (your personalized list of preventive services) with a due date:  Health Maintenance Due   Topic Date Due    Cholesterol Test   07/22/2022    Annual Well Visit  07/23/2022    Yearly Flu Vaccine (1) 08/01/2022

## 2022-12-29 NOTE — PROGRESS NOTES
1. Have you been to the ER, urgent care clinic since your last visit? Hospitalized since your last visit? Seen at [facility] on 12/8/2022 for bronchitis     2. Have you seen or consulted any other health care providers outside of the [address] [place] [address] since your last visit? Include any pap smears or colon screening.  No

## 2023-03-27 RX ORDER — NIFEDIPINE 60 MG/1
60 TABLET, EXTENDED RELEASE ORAL DAILY
Qty: 90 TABLET | Refills: 3 | Status: SHIPPED | OUTPATIENT
Start: 2023-03-27

## 2023-03-27 NOTE — TELEPHONE ENCOUNTER
PCP: Sy Trent MD    Last appt: 12/29/2022  Future Appointments   Date Time Provider Alvina Dixon   6/30/2023 10:45 AM Sy Trent MD [facility] BS AMB       Requested Prescriptions     Pending Prescriptions Disp Refills    NIFEdipine ER (ADALAT CC) 60 mg ER tablet 90 Tablet 3     Sig: Take 1 Tablet by mouth daily.

## 2023-06-30 ENCOUNTER — OFFICE VISIT (OUTPATIENT)
Age: 78
End: 2023-06-30
Payer: MEDICARE

## 2023-06-30 VITALS
HEART RATE: 67 BPM | SYSTOLIC BLOOD PRESSURE: 118 MMHG | DIASTOLIC BLOOD PRESSURE: 60 MMHG | OXYGEN SATURATION: 99 % | BODY MASS INDEX: 26.59 KG/M2 | HEIGHT: 67 IN | TEMPERATURE: 97.1 F | WEIGHT: 169.4 LBS | RESPIRATION RATE: 16 BRPM

## 2023-06-30 DIAGNOSIS — E78.5 HYPERLIPIDEMIA, UNSPECIFIED HYPERLIPIDEMIA TYPE: ICD-10-CM

## 2023-06-30 DIAGNOSIS — M81.0 AGE-RELATED OSTEOPOROSIS WITHOUT CURRENT PATHOLOGICAL FRACTURE: ICD-10-CM

## 2023-06-30 DIAGNOSIS — Z12.39 BREAST SCREENING: ICD-10-CM

## 2023-06-30 DIAGNOSIS — I70.1 ATHEROSCLEROSIS OF RENAL ARTERY (HCC): ICD-10-CM

## 2023-06-30 DIAGNOSIS — I65.23 BILATERAL CAROTID ARTERY STENOSIS: ICD-10-CM

## 2023-06-30 DIAGNOSIS — M88.9 PAGET'S BONE DISEASE: Primary | ICD-10-CM

## 2023-06-30 DIAGNOSIS — N18.30 STAGE 3 CHRONIC KIDNEY DISEASE, UNSPECIFIED WHETHER STAGE 3A OR 3B CKD (HCC): ICD-10-CM

## 2023-06-30 LAB
ANION GAP SERPL CALC-SCNC: 8 MMOL/L (ref 5–15)
BUN SERPL-MCNC: 19 MG/DL (ref 6–20)
BUN/CREAT SERPL: 15 (ref 12–20)
CALCIUM SERPL-MCNC: 9.8 MG/DL (ref 8.5–10.1)
CHLORIDE SERPL-SCNC: 106 MMOL/L (ref 97–108)
CO2 SERPL-SCNC: 26 MMOL/L (ref 21–32)
CREAT SERPL-MCNC: 1.28 MG/DL (ref 0.55–1.02)
ERYTHROCYTE [DISTWIDTH] IN BLOOD BY AUTOMATED COUNT: 12.3 % (ref 11.5–14.5)
GLUCOSE SERPL-MCNC: 106 MG/DL (ref 65–100)
HCT VFR BLD AUTO: 37.3 % (ref 35–47)
HGB BLD-MCNC: 11.7 G/DL (ref 11.5–16)
MCH RBC QN AUTO: 30 PG (ref 26–34)
MCHC RBC AUTO-ENTMCNC: 31.4 G/DL (ref 30–36.5)
MCV RBC AUTO: 95.6 FL (ref 80–99)
NRBC # BLD: 0 K/UL (ref 0–0.01)
NRBC BLD-RTO: 0 PER 100 WBC
PLATELET # BLD AUTO: 294 K/UL (ref 150–400)
PMV BLD AUTO: 11.1 FL (ref 8.9–12.9)
POTASSIUM SERPL-SCNC: 4 MMOL/L (ref 3.5–5.1)
RBC # BLD AUTO: 3.9 M/UL (ref 3.8–5.2)
SODIUM SERPL-SCNC: 140 MMOL/L (ref 136–145)
TSH SERPL DL<=0.05 MIU/L-ACNC: 3.08 UIU/ML (ref 0.36–3.74)
WBC # BLD AUTO: 6.6 K/UL (ref 3.6–11)

## 2023-06-30 PROCEDURE — 3074F SYST BP LT 130 MM HG: CPT | Performed by: INTERNAL MEDICINE

## 2023-06-30 PROCEDURE — 3078F DIAST BP <80 MM HG: CPT | Performed by: INTERNAL MEDICINE

## 2023-06-30 PROCEDURE — 1090F PRES/ABSN URINE INCON ASSESS: CPT | Performed by: INTERNAL MEDICINE

## 2023-06-30 PROCEDURE — G8399 PT W/DXA RESULTS DOCUMENT: HCPCS | Performed by: INTERNAL MEDICINE

## 2023-06-30 PROCEDURE — 4004F PT TOBACCO SCREEN RCVD TLK: CPT | Performed by: INTERNAL MEDICINE

## 2023-06-30 PROCEDURE — G8419 CALC BMI OUT NRM PARAM NOF/U: HCPCS | Performed by: INTERNAL MEDICINE

## 2023-06-30 PROCEDURE — 99213 OFFICE O/P EST LOW 20 MIN: CPT | Performed by: INTERNAL MEDICINE

## 2023-06-30 PROCEDURE — G8427 DOCREV CUR MEDS BY ELIG CLIN: HCPCS | Performed by: INTERNAL MEDICINE

## 2023-06-30 PROCEDURE — 1123F ACP DISCUSS/DSCN MKR DOCD: CPT | Performed by: INTERNAL MEDICINE

## 2023-06-30 RX ORDER — ASCORBIC ACID 1000 MG
TABLET ORAL DAILY
COMMUNITY

## 2023-06-30 SDOH — ECONOMIC STABILITY: FOOD INSECURITY: WITHIN THE PAST 12 MONTHS, YOU WORRIED THAT YOUR FOOD WOULD RUN OUT BEFORE YOU GOT MONEY TO BUY MORE.: NEVER TRUE

## 2023-06-30 SDOH — ECONOMIC STABILITY: INCOME INSECURITY: HOW HARD IS IT FOR YOU TO PAY FOR THE VERY BASICS LIKE FOOD, HOUSING, MEDICAL CARE, AND HEATING?: NOT HARD AT ALL

## 2023-06-30 SDOH — ECONOMIC STABILITY: FOOD INSECURITY: WITHIN THE PAST 12 MONTHS, THE FOOD YOU BOUGHT JUST DIDN'T LAST AND YOU DIDN'T HAVE MONEY TO GET MORE.: NEVER TRUE

## 2023-06-30 SDOH — ECONOMIC STABILITY: HOUSING INSECURITY
IN THE LAST 12 MONTHS, WAS THERE A TIME WHEN YOU DID NOT HAVE A STEADY PLACE TO SLEEP OR SLEPT IN A SHELTER (INCLUDING NOW)?: NO

## 2023-06-30 ASSESSMENT — PATIENT HEALTH QUESTIONNAIRE - PHQ9
SUM OF ALL RESPONSES TO PHQ QUESTIONS 1-9: 2
1. LITTLE INTEREST OR PLEASURE IN DOING THINGS: 1
SUM OF ALL RESPONSES TO PHQ9 QUESTIONS 1 & 2: 2
SUM OF ALL RESPONSES TO PHQ QUESTIONS 1-9: 2
SUM OF ALL RESPONSES TO PHQ QUESTIONS 1-9: 2
2. FEELING DOWN, DEPRESSED OR HOPELESS: 1
SUM OF ALL RESPONSES TO PHQ QUESTIONS 1-9: 2

## 2023-07-07 RX ORDER — FUROSEMIDE 20 MG/1
20 TABLET ORAL DAILY
Qty: 90 TABLET | Refills: 3 | Status: SHIPPED | OUTPATIENT
Start: 2023-07-07

## 2023-07-07 NOTE — TELEPHONE ENCOUNTER
Patient states she needs refill done Asap as she took last pill today & refill Never received from [address] on file. Patient needs refill done Asap for her furosemide (LASIX) 20 mg tablet. Please call if any questions or to advise when done for .  Thank you

## 2023-07-24 ENCOUNTER — HOSPITAL ENCOUNTER (OUTPATIENT)
Facility: HOSPITAL | Age: 78
Discharge: HOME | End: 2023-07-27
Attending: INTERNAL MEDICINE
Payer: MEDICARE

## 2023-07-24 ENCOUNTER — HOSPITAL ENCOUNTER (OUTPATIENT)
Facility: HOSPITAL | Age: 78
Discharge: HOME | End: 2023-07-26
Attending: INTERNAL MEDICINE
Payer: MEDICARE

## 2023-07-24 DIAGNOSIS — I65.23 BILATERAL CAROTID ARTERY STENOSIS: ICD-10-CM

## 2023-07-24 DIAGNOSIS — Z12.31 VISIT FOR SCREENING MAMMOGRAM: ICD-10-CM

## 2023-07-24 PROCEDURE — 77067 SCR MAMMO BI INCL CAD: CPT

## 2023-07-24 PROCEDURE — 93880 EXTRACRANIAL BILAT STUDY: CPT

## 2023-07-26 LAB
VAS LEFT CCA DIST EDV: 14.1 CM/S
VAS LEFT CCA DIST PSV: 69.8 CM/S
VAS LEFT CCA PROX EDV: 17.6 CM/S
VAS LEFT CCA PROX PSV: 90.3 CM/S
VAS LEFT ECA EDV: 7.63 CM/S
VAS LEFT ECA PSV: 86.6 CM/S
VAS LEFT ICA DIST EDV: 24.1 CM/S
VAS LEFT ICA DIST PSV: 96.5 CM/S
VAS LEFT ICA MID EDV: 21 CM/S
VAS LEFT ICA MID PSV: 68.9 CM/S
VAS LEFT ICA PROX EDV: 10.6 CM/S
VAS LEFT ICA PROX PSV: 39.1 CM/S
VAS LEFT ICA/CCA PSV: 1.38 NO UNITS
VAS LEFT SUBCLAVIAN PROX EDV: 0 CM/S
VAS LEFT SUBCLAVIAN PROX PSV: 155.4 CM/S
VAS LEFT VERTEBRAL EDV: 16.71 CM/S
VAS LEFT VERTEBRAL PSV: 42 CM/S
VAS RIGHT CCA DIST EDV: 10.6 CM/S
VAS RIGHT CCA DIST PSV: 71.4 CM/S
VAS RIGHT CCA PROX EDV: 10.6 CM/S
VAS RIGHT CCA PROX PSV: 48.1 CM/S
VAS RIGHT ECA EDV: 5.95 CM/S
VAS RIGHT ECA PSV: 77.1 CM/S
VAS RIGHT ICA DIST EDV: 12.1 CM/S
VAS RIGHT ICA DIST PSV: 43.7 CM/S
VAS RIGHT ICA MID EDV: 13.2 CM/S
VAS RIGHT ICA MID PSV: 67.6 CM/S
VAS RIGHT ICA PROX EDV: 13.2 CM/S
VAS RIGHT ICA PROX PSV: 70.1 CM/S
VAS RIGHT ICA/CCA PSV: 1 NO UNITS
VAS RIGHT SUBCLAVIAN PROX EDV: 0 CM/S
VAS RIGHT SUBCLAVIAN PROX PSV: 116.6 CM/S
VAS RIGHT VERTEBRAL EDV: 12.13 CM/S
VAS RIGHT VERTEBRAL PSV: 33.9 CM/S

## 2023-08-18 NOTE — TELEPHONE ENCOUNTER
Patient ear irrigated. One small ant flushed from right ear. Patient states that she feels better. Provider made aware.                          Nicol Briones  08/18/23 [id number] Was prescribed Gabapentin by PCP for Shingles -  Is that okay to take-          Thanks-

## 2024-01-09 ENCOUNTER — OFFICE VISIT (OUTPATIENT)
Age: 79
End: 2024-01-09
Payer: MEDICARE

## 2024-01-09 VITALS
TEMPERATURE: 97.2 F | RESPIRATION RATE: 18 BRPM | SYSTOLIC BLOOD PRESSURE: 162 MMHG | BODY MASS INDEX: 27.09 KG/M2 | HEART RATE: 67 BPM | WEIGHT: 172.6 LBS | OXYGEN SATURATION: 99 % | DIASTOLIC BLOOD PRESSURE: 76 MMHG | HEIGHT: 67 IN

## 2024-01-09 DIAGNOSIS — Z23 ENCOUNTER FOR IMMUNIZATION: ICD-10-CM

## 2024-01-09 DIAGNOSIS — M88.9 PAGET'S BONE DISEASE: ICD-10-CM

## 2024-01-09 DIAGNOSIS — H26.9 CATARACT OF LEFT EYE, UNSPECIFIED CATARACT TYPE: ICD-10-CM

## 2024-01-09 DIAGNOSIS — Z00.00 MEDICARE ANNUAL WELLNESS VISIT, SUBSEQUENT: ICD-10-CM

## 2024-01-09 DIAGNOSIS — E78.5 HYPERLIPIDEMIA, UNSPECIFIED HYPERLIPIDEMIA TYPE: ICD-10-CM

## 2024-01-09 DIAGNOSIS — I70.1 ATHEROSCLEROSIS OF RENAL ARTERY (HCC): ICD-10-CM

## 2024-01-09 DIAGNOSIS — I10 HYPERTENSION, UNSPECIFIED TYPE: Primary | ICD-10-CM

## 2024-01-09 DIAGNOSIS — N18.30 STAGE 3 CHRONIC KIDNEY DISEASE, UNSPECIFIED WHETHER STAGE 3A OR 3B CKD (HCC): ICD-10-CM

## 2024-01-09 LAB
ALT SERPL-CCNC: 18 U/L (ref 12–78)
ANION GAP SERPL CALC-SCNC: 6 MMOL/L (ref 5–15)
AST SERPL-CCNC: 19 U/L (ref 15–37)
BUN SERPL-MCNC: 25 MG/DL (ref 6–20)
BUN/CREAT SERPL: 15 (ref 12–20)
CALCIUM SERPL-MCNC: 9 MG/DL (ref 8.5–10.1)
CHLORIDE SERPL-SCNC: 102 MMOL/L (ref 97–108)
CHOLEST SERPL-MCNC: 202 MG/DL
CO2 SERPL-SCNC: 26 MMOL/L (ref 21–32)
COMMENT:: NORMAL
CREAT SERPL-MCNC: 1.67 MG/DL (ref 0.55–1.02)
GLUCOSE SERPL-MCNC: 104 MG/DL (ref 65–100)
HDLC SERPL-MCNC: 83 MG/DL
HDLC SERPL: 2.4 (ref 0–5)
LDLC SERPL CALC-MCNC: 97.2 MG/DL (ref 0–100)
POTASSIUM SERPL-SCNC: 4.1 MMOL/L (ref 3.5–5.1)
SODIUM SERPL-SCNC: 134 MMOL/L (ref 136–145)
SPECIMEN HOLD: NORMAL
TRIGL SERPL-MCNC: 109 MG/DL
VLDLC SERPL CALC-MCNC: 21.8 MG/DL

## 2024-01-09 PROCEDURE — G8427 DOCREV CUR MEDS BY ELIG CLIN: HCPCS | Performed by: INTERNAL MEDICINE

## 2024-01-09 PROCEDURE — G8419 CALC BMI OUT NRM PARAM NOF/U: HCPCS | Performed by: INTERNAL MEDICINE

## 2024-01-09 PROCEDURE — G8484 FLU IMMUNIZE NO ADMIN: HCPCS | Performed by: INTERNAL MEDICINE

## 2024-01-09 PROCEDURE — G0009 ADMIN PNEUMOCOCCAL VACCINE: HCPCS | Performed by: INTERNAL MEDICINE

## 2024-01-09 PROCEDURE — PBSHW PNEUMOCOCCAL, PCV20, PREVNAR 20, (AGE 6W+), IM, PF: Performed by: INTERNAL MEDICINE

## 2024-01-09 PROCEDURE — 1123F ACP DISCUSS/DSCN MKR DOCD: CPT | Performed by: INTERNAL MEDICINE

## 2024-01-09 PROCEDURE — 3078F DIAST BP <80 MM HG: CPT | Performed by: INTERNAL MEDICINE

## 2024-01-09 PROCEDURE — G8399 PT W/DXA RESULTS DOCUMENT: HCPCS | Performed by: INTERNAL MEDICINE

## 2024-01-09 PROCEDURE — 3077F SYST BP >= 140 MM HG: CPT | Performed by: INTERNAL MEDICINE

## 2024-01-09 PROCEDURE — PBSHW INFLUENZA, FLUAD, (AGE 65 Y+), IM, PF, 0.5 ML: Performed by: INTERNAL MEDICINE

## 2024-01-09 PROCEDURE — 99214 OFFICE O/P EST MOD 30 MIN: CPT | Performed by: INTERNAL MEDICINE

## 2024-01-09 PROCEDURE — G0439 PPPS, SUBSEQ VISIT: HCPCS | Performed by: INTERNAL MEDICINE

## 2024-01-09 PROCEDURE — 1036F TOBACCO NON-USER: CPT | Performed by: INTERNAL MEDICINE

## 2024-01-09 PROCEDURE — 1090F PRES/ABSN URINE INCON ASSESS: CPT | Performed by: INTERNAL MEDICINE

## 2024-01-09 PROCEDURE — 90694 VACC AIIV4 NO PRSRV 0.5ML IM: CPT | Performed by: INTERNAL MEDICINE

## 2024-01-09 ASSESSMENT — LIFESTYLE VARIABLES
HOW OFTEN DO YOU HAVE A DRINK CONTAINING ALCOHOL: 2-4 TIMES A MONTH
HOW MANY STANDARD DRINKS CONTAINING ALCOHOL DO YOU HAVE ON A TYPICAL DAY: 1 OR 2

## 2024-01-09 ASSESSMENT — PATIENT HEALTH QUESTIONNAIRE - PHQ9
1. LITTLE INTEREST OR PLEASURE IN DOING THINGS: 1
SUM OF ALL RESPONSES TO PHQ QUESTIONS 1-9: 1
2. FEELING DOWN, DEPRESSED OR HOPELESS: 0
SUM OF ALL RESPONSES TO PHQ QUESTIONS 1-9: 1
SUM OF ALL RESPONSES TO PHQ9 QUESTIONS 1 & 2: 1

## 2024-01-09 NOTE — PROGRESS NOTES
1. \"Have you been to the ER, urgent care clinic since your last visit?  Hospitalized since your last visit?\" No    2. \"Have you seen or consulted any other health care providers outside of the [facility] since your last visit?\" No     3. For patients aged 45-75: Has the patient had a colonoscopy / FIT/ Cologuard? No      If the patient is female:    4. For patients aged 40-74: Has the patient had a mammogram within the past 2 years?  2023 [facility]      5. For patients aged 21-65: Has the patient had a pap smear?  No    
HISTORY OF PRESENT ILLNESS   Faye Johnson   is a 78 y.o.  female.  F/u HTN , s/p bl renal artery stents for YARELI HLD postherpetic neuralgia. leg edema ckd 3 low vit d, mild right carotid carotid artery stenosis, osteopenia with low FRAX  hx pagets and medicare wellness--  Nephro Dr Vanegas-had OV last year. Has FU appt  Rheum Dr Canchola-pt declined fosamax-has fu in March  Mild carotid stenosis on last Duplex b/l  Home BP -pt reports family illness and stress.130/80 last month  Last OV     Dr Vanegas -CKD 3--missed her appt this year  Dr Diego-charlests dz-advised fu after dental procedure for alendronate and forearm osteopoross  Has dentures , no dental procedures     Home BP  Last LDL 97  CR 1.25  Patient Active Problem List    Diagnosis Date Noted   • Essential hypertension, malignant 04/26/2015   • CKD (chronic kidney disease) stage 3, GFR 30-59 ml/min ([facility]) 08/13/2019   • Early satiety 08/13/2019   • Osteopenia of hip 04/22/2019   • Vitamin D deficiency 04/12/2019   • Syncope and collapse 09/30/2018   • Syncope due to orthostatic hypotension 09/30/2018   • 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   • Bilateral carotid artery stenosis 12/05/2017   • Carotid bruit present 12/05/2017   • History of tubal ligation 09/14/2015   • Bilateral renal artery stenosis (HCC) 08/09/2015   • Stress at home 06/13/2015   • High cholesterol    • Left ventricular hypertrophy due to hypertensive disease    • H/O gastroesophageal reflux (GERD) 03/12/2015   • Snores 09/04/2013   • Obesity 09/04/2013   • Edema of both legs 09/04/2013     Current Outpatient Medications   Medication Sig Dispense Refill   • furosemide (LASIX) 20 MG tablet Take 1 tablet by mouth daily 90 tablet 3   • Coenzyme Q10 (CO Q 10) 10 MG CAPS Take by mouth daily     • rosuvastatin (CRESTOR) 5 MG tablet Take 1 tablet by mouth daily 90 tablet 3   • acetaminophen (TYLENOL) 500 MG tablet Take 1 tablet by mouth every 6 hours as needed     • aspirin 81 MG EC tablet Take 1 
What Type Of Note Output Would You Prefer (Optional)?: Standard Output
How Severe Is Your Rash?: moderate
Is This A New Presentation, Or A Follow-Up?: Rash

## 2024-01-09 NOTE — PATIENT INSTRUCTIONS
care proxy, health care surrogate). The form is also called a durable power of  for health care.  If you do not have an advance directive, decisions about your medical care may be made by a family member, or by a doctor or a  who doesn't know you.  It may help to think of an advance directive as a gift to the people who care for you. If you have one, they won't have to make tough decisions by themselves.  For more information, including forms for your state, see the [employer] website (www.caringinfo.org/planning/advance-directives/).  Follow-up care is a key part of your treatment and safety. Be sure to make and go to all appointments, and call your doctor if you are having problems. It's also a good idea to know your test results and keep a list of the medicines you take.  What should you include in an advance directive?  Many states have a unique advance directive form. (It may ask you to address specific issues.) Or you might use a universal form that's approved by many states.  If your form doesn't tell you what to address, it may be hard to know what to include in your advance directive. Use the questions below to help you get started.  Who do you want to make decisions about your medical care if you are not able to?  What life-support measures do you want if you have a serious illness that gets worse over time or can't be cured?  What are you most afraid of that might happen? (Maybe you're afraid of having pain, losing your independence, or being kept alive by machines.)  Where would you prefer to die? (Your home? A hospital? A nursing home?)  Do you want to donate your organs when you die?  Do you want certain Mandaen practices performed before you die?  When should you call for help?  Be sure to contact your doctor if you have any questions.  Where can you learn more?  Go to https://www.healthwise.net/patientEd and enter R264 to learn more about \"Advance Directives: Care

## 2024-03-20 NOTE — TELEPHONE ENCOUNTER
Received faxed refill request from pharmacy      PCP: Pérez Colindres MD    Last appt: 1/9/2024  Future Appointments   Date Time Provider Department Center   7/11/2024 10:45 AM Pérez Colindres MD [facility] BS AMB       Requested Prescriptions     Pending Prescriptions Disp Refills    NIFEdipine (ADALAT CC) 60 MG extended release tablet 90 tablet 3     Sig: Take 1 tablet by mouth daily

## 2024-04-08 NOTE — PROGRESS NOTES
Chief Complaint   Patient presents with    New Patient     Referred by Vesna Pelaez DNP for PVC's-     Shortness of Breath     with extreme exertion      1. Have you been to the ER, urgent care clinic since your last visit? Hospitalized since your last visit? No    2. Have you seen or consulted any other health care providers outside of the [address] [place] [address] since your last visit? Include any pap smears or colon screening.   No  \ Detail Level: Detailed Quality 431: Preventive Care And Screening: Unhealthy Alcohol Use - Screening: Patient not identified as an unhealthy alcohol user when screened for unhealthy alcohol use using a systematic screening method Quality 226: Preventive Care And Screening: Tobacco Use: Screening And Cessation Intervention: Patient screened for tobacco use and is an ex/non-smoker

## 2024-06-25 RX ORDER — ROSUVASTATIN CALCIUM 5 MG/1
5 TABLET, COATED ORAL DAILY
Qty: 90 TABLET | Refills: 2 | Status: SHIPPED | OUTPATIENT
Start: 2024-06-25

## 2024-06-25 NOTE — TELEPHONE ENCOUNTER
PCP: Pérez Colindres MD    Last appt: 1/9/2024   Future Appointments   Date Time Provider Department Center   7/11/2024 10:45 AM Pérez Colindres MD [facility] AMB       Requested Prescriptions     Pending Prescriptions Disp Refills    rosuvastatin (CRESTOR) 5 MG tablet 90 tablet 2     Sig: Take 1 tablet by mouth daily

## 2024-06-25 NOTE — TELEPHONE ENCOUNTER
Caller requests Refill of:  rosuvastatin (CRESTOR) 5 MG tablet       Please send to:  [id number] [address] - P [phone] - F [phone]         Visit / Appointment History:  Future Appointment at [facility]:  7/11/2024   Last Appointment With PCP:  1/9/2024       Caller confirmed instructions and dosages as correct.    Caller was advised that Meds will be refilled as soon as possible, however there can be a 48-72 business hour turn around on refill requests.

## 2024-07-10 NOTE — PROGRESS NOTES
HISTORY OF PRESENT ILLNESS   Faye Johnson   is a 79 y.o.  female.  F/u HTN , s/p bl renal artery stents for YARELI HLD postherpetic neuralgia. leg edema ckd 3 low vit d, mild right carotid carotid artery stenosis, osteopenia with low FRAX  hx pagets dz  Nephro Dr Vanegas--nifedipine dose increased to 90 mg this year. Farxiga was prescribed but she did get the medication-not covered. RPM BP monitoring from Dr Ríos office  VASC annual fu b/l YARELI stents Dr Kowalski  Home BP-done via remote monitoring  She reports her right arm BP is always lower then left arm-no right arm fatigue  Has been worried about her daughter on dialsysis now  Last OV  Nephro Dr Vanegas-had OV last year. Has FU appt  Rheum Dr Canchola-pt declined fosamax-has fu in March  Mild carotid stenosis on last Duplex b/l  Home BP -pt reports family illness and stress.130/80 last month  Patient Active Problem List    Diagnosis Date Noted    Essential hypertension, malignant 04/26/2015    CKD (chronic kidney disease) stage 3, GFR 30-59 ml/min (HCC) 08/13/2019    Early satiety 08/13/2019    Osteopenia of hip 04/22/2019    Vitamin D deficiency 04/12/2019    Syncope and collapse 09/30/2018    Syncope due to orthostatic hypotension 09/30/2018    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    Bilateral carotid artery stenosis 12/05/2017    Carotid bruit present 12/05/2017    History of tubal ligation 09/14/2015    Bilateral renal artery stenosis (HCC) 08/09/2015    Stress at home 06/13/2015    High cholesterol     Left ventricular hypertrophy due to hypertensive disease     H/O gastroesophageal reflux (GERD) 03/12/2015    Snores 09/04/2013    Obesity 09/04/2013    Edema of both legs 09/04/2013     Current Outpatient Medications   Medication Sig Dispense Refill    rosuvastatin (CRESTOR) 5 MG tablet Take 1 tablet by mouth daily 90 tablet 2    NIFEdipine (ADALAT CC) 60 MG extended release tablet Take 1 tablet by mouth daily 90 tablet 3    furosemide (LASIX) 20 MG tablet Take

## 2024-07-11 ENCOUNTER — OFFICE VISIT (OUTPATIENT)
Age: 79
End: 2024-07-11
Payer: MEDICARE

## 2024-07-11 VITALS
HEIGHT: 67 IN | OXYGEN SATURATION: 99 % | BODY MASS INDEX: 25.68 KG/M2 | SYSTOLIC BLOOD PRESSURE: 136 MMHG | HEART RATE: 55 BPM | RESPIRATION RATE: 16 BRPM | TEMPERATURE: 96.9 F | WEIGHT: 163.6 LBS | DIASTOLIC BLOOD PRESSURE: 80 MMHG

## 2024-07-11 DIAGNOSIS — I10 HYPERTENSION, UNSPECIFIED TYPE: ICD-10-CM

## 2024-07-11 DIAGNOSIS — I65.21 STENOSIS OF RIGHT CAROTID ARTERY: ICD-10-CM

## 2024-07-11 DIAGNOSIS — Z12.39 BREAST SCREENING: Primary | ICD-10-CM

## 2024-07-11 DIAGNOSIS — E78.00 PURE HYPERCHOLESTEROLEMIA: ICD-10-CM

## 2024-07-11 DIAGNOSIS — M88.9 PAGET'S BONE DISEASE: ICD-10-CM

## 2024-07-11 DIAGNOSIS — N18.30 STAGE 3 CHRONIC KIDNEY DISEASE, UNSPECIFIED WHETHER STAGE 3A OR 3B CKD (HCC): ICD-10-CM

## 2024-07-11 LAB
ANION GAP SERPL CALC-SCNC: 8 MMOL/L (ref 5–15)
BUN SERPL-MCNC: 31 MG/DL (ref 6–20)
BUN/CREAT SERPL: 16 (ref 12–20)
CALCIUM SERPL-MCNC: 9.4 MG/DL (ref 8.5–10.1)
CHLORIDE SERPL-SCNC: 105 MMOL/L (ref 97–108)
CHOLEST SERPL-MCNC: 183 MG/DL
CO2 SERPL-SCNC: 25 MMOL/L (ref 21–32)
CREAT SERPL-MCNC: 2 MG/DL (ref 0.55–1.02)
ERYTHROCYTE [DISTWIDTH] IN BLOOD BY AUTOMATED COUNT: 12.5 % (ref 11.5–14.5)
GLUCOSE SERPL-MCNC: 98 MG/DL (ref 65–100)
HCT VFR BLD AUTO: 36.7 % (ref 35–47)
HDLC SERPL-MCNC: 76 MG/DL
HDLC SERPL: 2.4 (ref 0–5)
HGB BLD-MCNC: 12.2 G/DL (ref 11.5–16)
LDLC SERPL CALC-MCNC: 84.6 MG/DL (ref 0–100)
MCH RBC QN AUTO: 30.8 PG (ref 26–34)
MCHC RBC AUTO-ENTMCNC: 33.2 G/DL (ref 30–36.5)
MCV RBC AUTO: 92.7 FL (ref 80–99)
NRBC # BLD: 0 K/UL (ref 0–0.01)
NRBC BLD-RTO: 0 PER 100 WBC
PLATELET # BLD AUTO: 281 K/UL (ref 150–400)
PMV BLD AUTO: 11 FL (ref 8.9–12.9)
POTASSIUM SERPL-SCNC: 4.5 MMOL/L (ref 3.5–5.1)
RBC # BLD AUTO: 3.96 M/UL (ref 3.8–5.2)
SODIUM SERPL-SCNC: 138 MMOL/L (ref 136–145)
TRIGL SERPL-MCNC: 112 MG/DL
VLDLC SERPL CALC-MCNC: 22.4 MG/DL
WBC # BLD AUTO: 5.3 K/UL (ref 3.6–11)

## 2024-07-11 PROCEDURE — 99214 OFFICE O/P EST MOD 30 MIN: CPT | Performed by: INTERNAL MEDICINE

## 2024-07-11 PROCEDURE — G8419 CALC BMI OUT NRM PARAM NOF/U: HCPCS | Performed by: INTERNAL MEDICINE

## 2024-07-11 PROCEDURE — 3075F SYST BP GE 130 - 139MM HG: CPT | Performed by: INTERNAL MEDICINE

## 2024-07-11 PROCEDURE — 1123F ACP DISCUSS/DSCN MKR DOCD: CPT | Performed by: INTERNAL MEDICINE

## 2024-07-11 PROCEDURE — 1090F PRES/ABSN URINE INCON ASSESS: CPT | Performed by: INTERNAL MEDICINE

## 2024-07-11 PROCEDURE — G8399 PT W/DXA RESULTS DOCUMENT: HCPCS | Performed by: INTERNAL MEDICINE

## 2024-07-11 PROCEDURE — 1036F TOBACCO NON-USER: CPT | Performed by: INTERNAL MEDICINE

## 2024-07-11 PROCEDURE — G8427 DOCREV CUR MEDS BY ELIG CLIN: HCPCS | Performed by: INTERNAL MEDICINE

## 2024-07-11 PROCEDURE — 3079F DIAST BP 80-89 MM HG: CPT | Performed by: INTERNAL MEDICINE

## 2024-07-11 SDOH — ECONOMIC STABILITY: FOOD INSECURITY: WITHIN THE PAST 12 MONTHS, YOU WORRIED THAT YOUR FOOD WOULD RUN OUT BEFORE YOU GOT MONEY TO BUY MORE.: NEVER TRUE

## 2024-07-11 SDOH — ECONOMIC STABILITY: INCOME INSECURITY: HOW HARD IS IT FOR YOU TO PAY FOR THE VERY BASICS LIKE FOOD, HOUSING, MEDICAL CARE, AND HEATING?: NOT VERY HARD

## 2024-07-11 SDOH — ECONOMIC STABILITY: FOOD INSECURITY: WITHIN THE PAST 12 MONTHS, THE FOOD YOU BOUGHT JUST DIDN'T LAST AND YOU DIDN'T HAVE MONEY TO GET MORE.: NEVER TRUE

## 2024-07-11 NOTE — PROGRESS NOTES
\"Have you been to the ER, urgent care clinic since your last visit?  Hospitalized since your last visit?\"    NO    “Have you seen or consulted any other health care providers outside of [facility] since your last visit?”    Romina            Click Here for Release of Records Request

## 2024-08-08 ENCOUNTER — TRANSCRIBE ORDERS (OUTPATIENT)
Facility: HOSPITAL | Age: 79
End: 2024-08-08

## 2024-08-08 DIAGNOSIS — Z12.31 SCREENING MAMMOGRAM FOR HIGH-RISK PATIENT: Primary | ICD-10-CM

## 2024-08-23 ENCOUNTER — TELEPHONE (OUTPATIENT)
Age: 79
End: 2024-08-23

## 2024-08-23 NOTE — TELEPHONE ENCOUNTER
Ann-Marie called in from [facility] to requesting patients last office note & labs. Fax to # [phone]

## 2024-09-05 ENCOUNTER — HOSPITAL ENCOUNTER (OUTPATIENT)
Facility: HOSPITAL | Age: 79
Discharge: HOME | End: 2024-09-08
Attending: INTERNAL MEDICINE
Payer: MEDICARE

## 2024-09-05 DIAGNOSIS — Z12.31 SCREENING MAMMOGRAM FOR HIGH-RISK PATIENT: ICD-10-CM

## 2024-09-05 PROCEDURE — 77067 SCR MAMMO BI INCL CAD: CPT

## 2024-09-16 RX ORDER — FUROSEMIDE 20 MG
20 TABLET ORAL DAILY
Qty: 90 TABLET | Refills: 1 | Status: SHIPPED | OUTPATIENT
Start: 2024-09-16

## 2024-12-05 ENCOUNTER — HOSPITAL ENCOUNTER (OUTPATIENT)
Facility: HOSPITAL | Age: 79
Discharge: HOME | End: 2024-12-08
Payer: MEDICARE

## 2024-12-05 DIAGNOSIS — I49.5 SICK SINUS SYNDROME (HCC): ICD-10-CM

## 2024-12-05 PROCEDURE — 71046 X-RAY EXAM CHEST 2 VIEWS: CPT

## 2024-12-13 ENCOUNTER — TELEPHONE (OUTPATIENT)
Age: 79
End: 2024-12-13

## 2024-12-13 NOTE — TELEPHONE ENCOUNTER
Pt would like Dr. Colindres to know that she is scheduled for Monday, 12-16-24 @ 8 am for a pacemaker.

## 2024-12-16 ENCOUNTER — APPOINTMENT (OUTPATIENT)
Facility: HOSPITAL | Age: 79
End: 2024-12-16
Attending: INTERNAL MEDICINE
Payer: MEDICARE

## 2024-12-16 ENCOUNTER — ANESTHESIA EVENT (OUTPATIENT)
Facility: HOSPITAL | Age: 79
End: 2024-12-16
Payer: MEDICARE

## 2024-12-16 ENCOUNTER — ANESTHESIA (OUTPATIENT)
Facility: HOSPITAL | Age: 79
End: 2024-12-16
Payer: MEDICARE

## 2024-12-16 ENCOUNTER — HOSPITAL ENCOUNTER (OUTPATIENT)
Facility: HOSPITAL | Age: 79
Discharge: HOME | End: 2024-12-16
Attending: INTERNAL MEDICINE | Admitting: INTERNAL MEDICINE
Payer: MEDICARE

## 2024-12-16 VITALS
SYSTOLIC BLOOD PRESSURE: 133 MMHG | HEART RATE: 81 BPM | HEIGHT: 67 IN | BODY MASS INDEX: 25.9 KG/M2 | DIASTOLIC BLOOD PRESSURE: 78 MMHG | WEIGHT: 165 LBS | RESPIRATION RATE: 16 BRPM | TEMPERATURE: 97.2 F | OXYGEN SATURATION: 98 %

## 2024-12-16 DIAGNOSIS — I49.9 ABNORMAL HEART RHYTHM: ICD-10-CM

## 2024-12-16 DIAGNOSIS — I49.5 SSS (SICK SINUS SYNDROME) (HCC): Primary | ICD-10-CM

## 2024-12-16 PROCEDURE — 6370000000 HC RX 637 (ALT 250 FOR IP): Performed by: INTERNAL MEDICINE

## 2024-12-16 PROCEDURE — C1898 LEAD, PMKR, OTHER THAN TRANS: HCPCS | Performed by: INTERNAL MEDICINE

## 2024-12-16 PROCEDURE — C1894 INTRO/SHEATH, NON-LASER: HCPCS | Performed by: INTERNAL MEDICINE

## 2024-12-16 PROCEDURE — 71045 X-RAY EXAM CHEST 1 VIEW: CPT

## 2024-12-16 PROCEDURE — 6360000002 HC RX W HCPCS: Performed by: INTERNAL MEDICINE

## 2024-12-16 PROCEDURE — 3700000000 HC ANESTHESIA ATTENDED CARE: Performed by: INTERNAL MEDICINE

## 2024-12-16 PROCEDURE — C1892 INTRO/SHEATH,FIXED,PEEL-AWAY: HCPCS | Performed by: INTERNAL MEDICINE

## 2024-12-16 PROCEDURE — 33208 INSRT HEART PM ATRIAL & VENT: CPT | Performed by: INTERNAL MEDICINE

## 2024-12-16 PROCEDURE — C1785 PMKR, DUAL, RATE-RESP: HCPCS | Performed by: INTERNAL MEDICINE

## 2024-12-16 PROCEDURE — 2500000003 HC RX 250 WO HCPCS

## 2024-12-16 PROCEDURE — 71046 X-RAY EXAM CHEST 2 VIEWS: CPT

## 2024-12-16 PROCEDURE — 2580000003 HC RX 258

## 2024-12-16 PROCEDURE — 2709999900 HC NON-CHARGEABLE SUPPLY: Performed by: INTERNAL MEDICINE

## 2024-12-16 PROCEDURE — 6360000002 HC RX W HCPCS

## 2024-12-16 PROCEDURE — 3700000001 HC ADD 15 MINUTES (ANESTHESIA): Performed by: INTERNAL MEDICINE

## 2024-12-16 DEVICE — PACE/SENSE LEAD
Type: IMPLANTABLE DEVICE | Status: FUNCTIONAL
Brand: INGEVITY™+

## 2024-12-16 DEVICE — PACEMAKER
Type: IMPLANTABLE DEVICE | Status: FUNCTIONAL
Brand: ACCOLADE™ MRI EL DR

## 2024-12-16 RX ORDER — HYDRALAZINE HYDROCHLORIDE 20 MG/ML
10 INJECTION INTRAMUSCULAR; INTRAVENOUS ONCE
Status: COMPLETED | OUTPATIENT
Start: 2024-12-16 | End: 2024-12-16

## 2024-12-16 RX ORDER — FENTANYL CITRATE 50 UG/ML
INJECTION, SOLUTION INTRAMUSCULAR; INTRAVENOUS
Status: DISCONTINUED | OUTPATIENT
Start: 2024-12-16 | End: 2024-12-16 | Stop reason: SDUPTHER

## 2024-12-16 RX ORDER — HEPARIN SODIUM 10000 [USP'U]/ML
INJECTION, SOLUTION INTRAVENOUS; SUBCUTANEOUS PRN
Status: DISCONTINUED | OUTPATIENT
Start: 2024-12-16 | End: 2024-12-16 | Stop reason: HOSPADM

## 2024-12-16 RX ORDER — SODIUM CHLORIDE 9 MG/ML
INJECTION, SOLUTION INTRAVENOUS PRN
Status: DISCONTINUED | OUTPATIENT
Start: 2024-12-16 | End: 2024-12-16 | Stop reason: HOSPADM

## 2024-12-16 RX ORDER — ACETAMINOPHEN 325 MG/1
650 TABLET ORAL EVERY 4 HOURS PRN
Status: DISCONTINUED | OUTPATIENT
Start: 2024-12-16 | End: 2024-12-16 | Stop reason: HOSPADM

## 2024-12-16 RX ORDER — LIDOCAINE HYDROCHLORIDE 10 MG/ML
INJECTION, SOLUTION INFILTRATION; PERINEURAL PRN
Status: DISCONTINUED | OUTPATIENT
Start: 2024-12-16 | End: 2024-12-16 | Stop reason: HOSPADM

## 2024-12-16 RX ORDER — HYDRALAZINE HYDROCHLORIDE 20 MG/ML
5 INJECTION INTRAMUSCULAR; INTRAVENOUS ONCE
Status: COMPLETED | OUTPATIENT
Start: 2024-12-16 | End: 2024-12-16

## 2024-12-16 RX ORDER — ONDANSETRON 2 MG/ML
INJECTION INTRAMUSCULAR; INTRAVENOUS
Status: DISCONTINUED | OUTPATIENT
Start: 2024-12-16 | End: 2024-12-16 | Stop reason: SDUPTHER

## 2024-12-16 RX ORDER — 0.9 % SODIUM CHLORIDE 0.9 %
INTRAVENOUS SOLUTION INTRAVENOUS
Status: DISCONTINUED | OUTPATIENT
Start: 2024-12-16 | End: 2024-12-16 | Stop reason: SDUPTHER

## 2024-12-16 RX ORDER — SODIUM CHLORIDE 0.9 % (FLUSH) 0.9 %
5-40 SYRINGE (ML) INJECTION EVERY 12 HOURS SCHEDULED
Status: DISCONTINUED | OUTPATIENT
Start: 2024-12-16 | End: 2024-12-16 | Stop reason: HOSPADM

## 2024-12-16 RX ORDER — DEXMEDETOMIDINE HYDROCHLORIDE 100 UG/ML
INJECTION, SOLUTION INTRAVENOUS
Status: DISCONTINUED | OUTPATIENT
Start: 2024-12-16 | End: 2024-12-16 | Stop reason: SDUPTHER

## 2024-12-16 RX ORDER — SODIUM CHLORIDE 0.9 % (FLUSH) 0.9 %
5-40 SYRINGE (ML) INJECTION PRN
Status: DISCONTINUED | OUTPATIENT
Start: 2024-12-16 | End: 2024-12-16 | Stop reason: HOSPADM

## 2024-12-16 RX ORDER — LIDOCAINE HYDROCHLORIDE 20 MG/ML
INJECTION, SOLUTION EPIDURAL; INFILTRATION; INTRACAUDAL; PERINEURAL
Status: DISCONTINUED | OUTPATIENT
Start: 2024-12-16 | End: 2024-12-16 | Stop reason: SDUPTHER

## 2024-12-16 RX ADMIN — PROPOFOL 125 MCG/KG/MIN: 10 INJECTION, EMULSION INTRAVENOUS at 11:16

## 2024-12-16 RX ADMIN — HYDRALAZINE HYDROCHLORIDE 5 MG: 20 INJECTION INTRAMUSCULAR; INTRAVENOUS at 12:39

## 2024-12-16 RX ADMIN — ONDANSETRON HYDROCHLORIDE 4 MG: 2 INJECTION, SOLUTION INTRAMUSCULAR; INTRAVENOUS at 11:13

## 2024-12-16 RX ADMIN — FENTANYL CITRATE 25 MCG: 50 INJECTION, SOLUTION INTRAMUSCULAR; INTRAVENOUS at 11:35

## 2024-12-16 RX ADMIN — PROPOFOL 30 MG: 10 INJECTION, EMULSION INTRAVENOUS at 11:15

## 2024-12-16 RX ADMIN — SODIUM CHLORIDE 100 ML/HR: 9 INJECTION, SOLUTION INTRAVENOUS at 11:12

## 2024-12-16 RX ADMIN — LIDOCAINE HYDROCHLORIDE 100 MG: 20 INJECTION, SOLUTION EPIDURAL; INFILTRATION; INTRACAUDAL; PERINEURAL at 11:15

## 2024-12-16 RX ADMIN — ACETAMINOPHEN 650 MG: 325 TABLET ORAL at 15:20

## 2024-12-16 RX ADMIN — FENTANYL CITRATE 25 MCG: 50 INJECTION, SOLUTION INTRAMUSCULAR; INTRAVENOUS at 11:27

## 2024-12-16 RX ADMIN — DEXMEDETOMIDINE HYDROCHLORIDE 4 MCG: 100 INJECTION, SOLUTION, CONCENTRATE INTRAVENOUS at 11:14

## 2024-12-16 RX ADMIN — FENTANYL CITRATE 25 MCG: 50 INJECTION, SOLUTION INTRAMUSCULAR; INTRAVENOUS at 11:15

## 2024-12-16 RX ADMIN — HYDRALAZINE HYDROCHLORIDE 10 MG: 20 INJECTION INTRAMUSCULAR; INTRAVENOUS at 14:03

## 2024-12-16 RX ADMIN — VANCOMYCIN HYDROCHLORIDE 1000 MG: 1 INJECTION, POWDER, LYOPHILIZED, FOR SOLUTION INTRAVENOUS at 11:13

## 2024-12-16 ASSESSMENT — PAIN - FUNCTIONAL ASSESSMENT: PAIN_FUNCTIONAL_ASSESSMENT: NONE - DENIES PAIN

## 2024-12-16 ASSESSMENT — PAIN DESCRIPTION - LOCATION: LOCATION: CHEST

## 2024-12-16 ASSESSMENT — PAIN DESCRIPTION - DESCRIPTORS: DESCRIPTORS: ACHING

## 2024-12-16 ASSESSMENT — PAIN DESCRIPTION - ORIENTATION: ORIENTATION: LEFT;UPPER

## 2024-12-16 ASSESSMENT — PAIN SCALES - GENERAL: PAINLEVEL_OUTOF10: 8

## 2024-12-16 NOTE — PROGRESS NOTES
Pt walked 6ft from bed and back and reported feeling lightheaded; pt assisted back to bed and BP to cycle; will give pt an extra 10-15 mins of resting prior to trying to ambulate again

## 2024-12-16 NOTE — ANESTHESIA POSTPROCEDURE EVALUATION
Department of Anesthesiology  Postprocedure Note    Patient: Faye Johnson  MRN: [id number]  YOB: 1945  Date of evaluation: 12/16/2024    Procedure Summary       Date: 12/16/24 Room / Location: [facility] EP LAB / [facility] CARDIAC CATH LAB    Anesthesia Start: 1112 Anesthesia Stop: 1210    Procedure: Insert PPM dual Diagnosis: SSS (sick sinus syndrome) ([facility])    Providers: Sarath Perez MD Responsible Provider: Eusebio Hopper MD    Anesthesia Type: MAC ASA Status: 3            Anesthesia Type: MAC    Helena Phase I: Helena Score: 10    Helena Phase II:      Anesthesia Post Evaluation    Patient location during evaluation: PACU  Patient participation: complete - patient participated  Level of consciousness: responsive to verbal stimuli and sleepy but conscious  Pain score: 2  Airway patency: patent  Cardiovascular status: blood pressure returned to baseline  Respiratory status: acceptable  Hydration status: stable  Comments: +Post-Anesthesia Evaluation and Assessment    Patient: Faye Johnson MRN: [id number]  SSN: [id number]   YOB: 1945  Age: 79 y.o.  Sex: female          Cardiovascular Function/Vital Signs    BP (!) 169/100   Pulse 72   Temp 97.2 °F (36.2 °C) (Axillary)   Resp 16   Ht 1.702 m (5' 7\")   Wt 74.8 kg (165 lb)   SpO2 97%   BMI 25.84 kg/m²     Patient is status post Procedure(s):  Insert PPM dual.    Nausea/Vomiting: Controlled.    Postoperative hydration reviewed and adequate.    Pain:      Managed.    Neurological Status:       At baseline.    Mental Status and Level of Consciousness: Arousable.    Pulmonary Status:       Adequate oxygenation and airway patent.    Complications related to anesthesia: None    Post-anesthesia assessment completed. No concerns.    I have evaluated the patient and the patient is stable and ready to be discharged from PACU .    Signed By: Eusebio Hopper MD    12/16/2024    Multimodal analgesia pain management approach  Pain management:

## 2024-12-16 NOTE — PROGRESS NOTES
Cardiac Cath Lab Recovery Arrival Note:      Faye Johnson arrived to Cardiac Cath Lab, Recovery Area. Staff introduced to patient. Patient identifiers verified with NAME and DATE OF BIRTH. Procedure verified with patient. Consent forms reviewed and signed by patient or authorized representative and verified. Allergies verified.     Patient and family oriented to department. Patient and family informed of procedure and plan of care.     Questions answered with review. Patient prepped for procedure, per orders from physician, prior to arrival.    Patient on cardiac monitor, non-invasive blood pressure, SPO2 monitor. On RA. Patient is A&Ox 4. Patient reports no pain.     Patient in stretcher, in low position, with side rails up, call bell within reach, patient instructed to call if assistance as needed.    Patient prep in: [facility] Recovery Area, [place] [address].     Family in: Manuel III.   Prep by: Homar

## 2024-12-16 NOTE — PROGRESS NOTES
Portable cxr read as small ptx    I repeated PA/LAT cxr in radiology - no PTX noted    Ok for dc    Thank you for allowing me to participate in this patients care.    Sarath Perez MD, FACC, RS

## 2024-12-16 NOTE — ANESTHESIA PRE PROCEDURE
Department of Anesthesiology  Preprocedure Note       Name:  Faye Johnson   Age:  79 y.o.  :  1945                                          MRN:  [id number]         Date:  2024      Surgeon: Surgeon(s):  Sarath Perez MD    Procedure: Procedure(s):  Insert PPM dual    Medications prior to admission:   Prior to Admission medications    Medication Sig Start Date End Date Taking? Authorizing Provider   furosemide (LASIX) 20 MG tablet Take 1 tablet by mouth daily 24  Yes Pérez Colindres MD   rosuvastatin (CRESTOR) 5 MG tablet Take 1 tablet by mouth daily 24  Yes Pérez Colindres MD   NIFEdipine (ADALAT CC) 60 MG extended release tablet Take 1 tablet by mouth daily 3/20/24  Yes Pérez Colindres MD   Coenzyme Q10 (CO Q 10) 10 MG CAPS Take by mouth daily   Yes Provider, MD Shanel   acetaminophen (TYLENOL) 500 MG tablet Take 1 tablet by mouth every 6 hours as needed   Yes Automatic Reconciliation, Ar   aspirin 81 MG EC tablet Take 1 tablet by mouth daily   Yes Automatic Reconciliation, Ar   beclomethasone (QVAR REDIHALER) 80 MCG/ACT AERB inhaler Inhale 1 puff into the lungs daily 22  Yes Automatic Reconciliation, Ar   Biotin 2.5 MG CAPS Take by mouth   Yes Automatic Reconciliation, Ar   vitamin D (CHOLECALCIFEROL) 25 MCG (1000 UT) TABS tablet Take by mouth daily   Yes Automatic Reconciliation, Ar   potassium chloride (KLOR-CON) 10 MEQ extended release tablet TAKE 1 TABLET BY MOUTH TWICE DAILY 8/3/20  Yes Automatic Reconciliation, Ar       Current medications:    No current facility-administered medications for this encounter.       Allergies:    Allergies   Allergen Reactions   • Amlodipine Other (See Comments)     Itching,  Muscle cramps   • Clonidine Swelling   • Ibuprofen Swelling   • Levofloxacin      Other reaction(s): Unknown (comments)   • Lisinopril Angioedema   • Penicillins Swelling   • Pravastatin Myalgia   • Sulfa Antibiotics Hives       Problem List:    Patient Active

## 2024-12-16 NOTE — DISCHARGE INSTRUCTIONS
[facility]  [address] [place] [address]  [phone]  [email or web address]     NEW PACEMAKER IMPLANT DISCHARGE INSTRUCTIONS    Patient ID:  Faye Johnson  [id number]  79 y.o.  1945    Admit Date: 12/16/2024    Discharge Date: 12/16/2024     Admitting Physician: [unfilled]     Discharge Physician: [unfilled]    Admission Diagnoses:   Abnormal heart rhythm [I49.9]    Discharge Diagnoses:   [unfilled]    Discharge Condition: Good    Cardiology Procedures this Admission:  Pacemaker insertion.     Disposition: home    Reference discharge instructions provided by nursing for diet and activity.    Follow-up with device clinic in three weeks. Call [phone] to make an appointment.    Signed:  aSrath Perez MD  12/16/2024  12:02 PM      DISCHARGE INSTRUCTIONS FOR PATIENTS WITH PACEMAKERS    1. Remember to call for an appointment for 3 weeks [phone] to check healing and implant programming.  2. Medic Alert Bracelets are available from your pharmacist to wear at all times if you choose to wear one.  3. Carry your ID card for pacemaker with you at all times.  This card will be given to you in the hospital or mailed to you.  4. The pacemaker will bulge slightly under your skin.  The bulge will decrease in size over the next few weeks.  Please notify the doctor's office if you notice any of the following around your site:   A.  A bruise that does not go away.   B.  Soreness or yellow, green, or brown drainage from the site.   C.  Any swelling from the site.   D.  If you have a fever of 100 degrees or higher that lasts for a few days.    INCISION CARE       1.  Leave the dressing over your site until your follow up visit.  2.  You may not shower until after follow up visit.   3.  For comfort, wear loose fitting clothing.  4.  Ice pack to affected shoulder for first 24 hours, wear your sling for 2 days.  5.  Report any signs of infection, fever, pain, swelling,

## 2024-12-19 LAB — ECHO BSA: 1.88 M2

## 2025-01-30 ENCOUNTER — OFFICE VISIT (OUTPATIENT)
Age: 80
End: 2025-01-30
Payer: MEDICARE

## 2025-01-30 VITALS
DIASTOLIC BLOOD PRESSURE: 78 MMHG | BODY MASS INDEX: 24.96 KG/M2 | HEART RATE: 72 BPM | WEIGHT: 159 LBS | RESPIRATION RATE: 18 BRPM | HEIGHT: 67 IN | OXYGEN SATURATION: 98 % | SYSTOLIC BLOOD PRESSURE: 148 MMHG | TEMPERATURE: 98.8 F

## 2025-01-30 DIAGNOSIS — N18.30 STAGE 3 CHRONIC KIDNEY DISEASE, UNSPECIFIED WHETHER STAGE 3A OR 3B CKD (HCC): ICD-10-CM

## 2025-01-30 DIAGNOSIS — I70.1 RAS (RENAL ARTERY STENOSIS) (HCC): ICD-10-CM

## 2025-01-30 DIAGNOSIS — I10 HYPERTENSION, UNSPECIFIED TYPE: Primary | ICD-10-CM

## 2025-01-30 DIAGNOSIS — Z95.0 S/P PLACEMENT OF CARDIAC PACEMAKER: ICD-10-CM

## 2025-01-30 DIAGNOSIS — R63.4 WEIGHT LOSS: ICD-10-CM

## 2025-01-30 DIAGNOSIS — Z23 ENCOUNTER FOR IMMUNIZATION: ICD-10-CM

## 2025-01-30 DIAGNOSIS — E78.5 HYPERLIPIDEMIA, UNSPECIFIED HYPERLIPIDEMIA TYPE: ICD-10-CM

## 2025-01-30 DIAGNOSIS — I65.23 ASYMPTOMATIC BILATERAL CAROTID ARTERY STENOSIS: ICD-10-CM

## 2025-01-30 DIAGNOSIS — I49.5 SSS (SICK SINUS SYNDROME) (HCC): ICD-10-CM

## 2025-01-30 DIAGNOSIS — Z23 NEEDS FLU SHOT: ICD-10-CM

## 2025-01-30 DIAGNOSIS — Z78.0 MENOPAUSE: ICD-10-CM

## 2025-01-30 DIAGNOSIS — Z00.00 MEDICARE ANNUAL WELLNESS VISIT, SUBSEQUENT: ICD-10-CM

## 2025-01-30 PROCEDURE — 90653 IIV ADJUVANT VACCINE IM: CPT | Performed by: INTERNAL MEDICINE

## 2025-01-30 PROCEDURE — 99214 OFFICE O/P EST MOD 30 MIN: CPT | Performed by: INTERNAL MEDICINE

## 2025-01-30 RX ORDER — FUROSEMIDE 20 MG/1
20 TABLET ORAL DAILY
Qty: 90 TABLET | Refills: 3 | Status: SHIPPED | OUTPATIENT
Start: 2025-01-30

## 2025-01-30 RX ORDER — ROSUVASTATIN CALCIUM 5 MG/1
5 TABLET, COATED ORAL DAILY
Qty: 90 TABLET | Refills: 3 | Status: SHIPPED | OUTPATIENT
Start: 2025-01-30

## 2025-01-30 SDOH — ECONOMIC STABILITY: FOOD INSECURITY: WITHIN THE PAST 12 MONTHS, YOU WORRIED THAT YOUR FOOD WOULD RUN OUT BEFORE YOU GOT MONEY TO BUY MORE.: NEVER TRUE

## 2025-01-30 SDOH — ECONOMIC STABILITY: FOOD INSECURITY: WITHIN THE PAST 12 MONTHS, THE FOOD YOU BOUGHT JUST DIDN'T LAST AND YOU DIDN'T HAVE MONEY TO GET MORE.: NEVER TRUE

## 2025-01-30 ASSESSMENT — PATIENT HEALTH QUESTIONNAIRE - PHQ9
SUM OF ALL RESPONSES TO PHQ QUESTIONS 1-9: 0
SUM OF ALL RESPONSES TO PHQ9 QUESTIONS 1 & 2: 0
1. LITTLE INTEREST OR PLEASURE IN DOING THINGS: NOT AT ALL
2. FEELING DOWN, DEPRESSED OR HOPELESS: NOT AT ALL

## 2025-01-30 ASSESSMENT — LIFESTYLE VARIABLES
HOW MANY STANDARD DRINKS CONTAINING ALCOHOL DO YOU HAVE ON A TYPICAL DAY: 1 OR 2
HOW OFTEN DO YOU HAVE A DRINK CONTAINING ALCOHOL: MONTHLY OR LESS

## 2025-01-30 NOTE — PROGRESS NOTES
\"Have you been to the ER, urgent care clinic since your last visit?  Hospitalized since your last visit?\"    NO    “Have you seen or consulted any other health care providers outside our system since your last visit?”    NO           
stenosis   Loud bruit on right--repeat duplex ordered  Menopause  -     DEXA BONE DENSITY AXIAL SKELETON; Future  Needs flu shot  -     Influenza, FLUAD Trivalent, (age 65 y+), IM, Preservative Free, 0.5mL  Weight loss  -     encouraged adequate caloric intake    Encounter for immunization  -     Fluad    Fluad today  Recommended rsv and covid shots  Dexa ordered  AMD forms    RTC 6 months fu     No follow-ups on file.     Subjective       Patient's complete Health Risk Assessment and screening values have been reviewed and are found in Flowsheets. The following problems were reviewed today and where indicated follow up appointments were made and/or referrals ordered.    Positive Risk Factor Screenings with Interventions:    Fall Risk:  Do you feel unsteady or are you worried about falling? : no  2 or more falls in past year?: (!) yes  Fall with injury in past year?: no     Interventions:    Reviewed medications, home hazards, visual acuity, and co-morbidities that can increase risk for falls       Drug Use:   Substance and Sexual Activity   Drug Use Yes    Types: OTC, Prescription       Interventions:  Patient declined any further intervention or treatment          Poor Eating Habits/Diet:  Do you eat balanced/healthy meals regularly?: (!) No    Interventions:  Encouraged balanced diet        Safety:  Do you always fasten your seatbelt when you are in a car?: (!) No    Interventions:  Advised seat belt     Advanced Directives:  Do you have a Living Will?: (!) No    Intervention:  has NO advanced directive - information provided                     Objective   Vitals:    01/30/25 1042   BP: (!) 148/78   Site: Right Upper Arm   Position: Sitting   Cuff Size: Medium Adult   Pulse: 72   Resp: 18   Temp: 98.8 °F (37.1 °C)   TempSrc: Oral   SpO2: 98%   Weight: 72.1 kg (159 lb)   Height: 1.702 m (5' 7\")      Body mass index is 24.9 kg/m².                    Allergies   Allergen Reactions    Amlodipine Other (See Comments)

## 2025-02-12 ENCOUNTER — HOSPITAL ENCOUNTER (OUTPATIENT)
Facility: HOSPITAL | Age: 80
Discharge: HOME | End: 2025-02-14
Attending: INTERNAL MEDICINE
Payer: MEDICARE

## 2025-02-12 ENCOUNTER — HOSPITAL ENCOUNTER (OUTPATIENT)
Facility: HOSPITAL | Age: 80
Discharge: HOME | End: 2025-02-15
Attending: INTERNAL MEDICINE
Payer: MEDICARE

## 2025-02-12 DIAGNOSIS — I77.1 SUBCLAVIAN ARTERY STENOSIS, RIGHT (HCC): Primary | ICD-10-CM

## 2025-02-12 DIAGNOSIS — Z78.0 MENOPAUSE: ICD-10-CM

## 2025-02-12 DIAGNOSIS — I65.23 ASYMPTOMATIC BILATERAL CAROTID ARTERY STENOSIS: ICD-10-CM

## 2025-02-12 LAB
VAS LEFT CCA DIST EDV: 21.4 CM/S
VAS LEFT CCA DIST PSV: 62.9 CM/S
VAS LEFT CCA PROX EDV: 25.1 CM/S
VAS LEFT CCA PROX PSV: 78 CM/S
VAS LEFT ECA EDV: 12.7 CM/S
VAS LEFT ECA PSV: 64.2 CM/S
VAS LEFT ICA DIST EDV: 21.2 CM/S
VAS LEFT ICA DIST PSV: 57.5 CM/S
VAS LEFT ICA MID EDV: 24 CM/S
VAS LEFT ICA MID PSV: 59.1 CM/S
VAS LEFT ICA PROX EDV: 22.3 CM/S
VAS LEFT ICA PROX PSV: 55.4 CM/S
VAS LEFT ICA/CCA PSV: 0.9 NO UNITS
VAS LEFT SUBCLAVIAN PROX EDV: 0 CM/S
VAS LEFT SUBCLAVIAN PROX PSV: 146.8 CM/S
VAS LEFT VERTEBRAL EDV: 52.2 CM/S
VAS LEFT VERTEBRAL PSV: 116.1 CM/S
VAS RIGHT CCA DIST EDV: 9.4 CM/S
VAS RIGHT CCA DIST PSV: 65.1 CM/S
VAS RIGHT CCA PROX EDV: 14.9 CM/S
VAS RIGHT CCA PROX PSV: 73.2 CM/S
VAS RIGHT ECA EDV: 14.2 CM/S
VAS RIGHT ECA PSV: 90.4 CM/S
VAS RIGHT ICA DIST EDV: 25.7 CM/S
VAS RIGHT ICA DIST PSV: 78.5 CM/S
VAS RIGHT ICA MID EDV: 15.3 CM/S
VAS RIGHT ICA MID PSV: 49.3 CM/S
VAS RIGHT ICA PROX EDV: 24.8 CM/S
VAS RIGHT ICA PROX PSV: 74.3 CM/S
VAS RIGHT ICA/CCA PSV: 1.2 NO UNITS
VAS RIGHT SUBCLAVIAN PROX EDV: 17.4 CM/S
VAS RIGHT SUBCLAVIAN PROX PSV: 481.1 CM/S
VAS RIGHT VERTEBRAL EDV: 14.7 CM/S
VAS RIGHT VERTEBRAL PSV: 43.8 CM/S

## 2025-02-12 PROCEDURE — 93880 EXTRACRANIAL BILAT STUDY: CPT

## 2025-02-12 PROCEDURE — 77080 DXA BONE DENSITY AXIAL: CPT

## 2025-03-28 NOTE — TELEPHONE ENCOUNTER
PCP: Pérez Colindres MD    Last appt: 1/30/2025   Future Appointments   Date Time Provider Department Center   7/31/2025 10:30 AM Pérez Colindres MD [facility] DEP       Requested Prescriptions     Pending Prescriptions Disp Refills    NIFEdipine (ADALAT CC) 60 MG extended release tablet 90 tablet 3     Sig: Take 1 tablet by mouth daily       Prior labs and Blood pressures:  BP Readings from Last 3 Encounters:   01/30/25 (!) 148/78   12/16/24 133/78   07/11/24 136/80     Lab Results   Component Value Date/Time     07/11/2024 10:39 AM    K 4.5 07/11/2024 10:39 AM     07/11/2024 10:39 AM    CO2 25 07/11/2024 10:39 AM    BUN 31 07/11/2024 10:39 AM    GFRAA 47 07/22/2021 02:18 PM     No results found for: \"HBA1C\", \"[id number]\"  Lab Results   Component Value Date/Time    CHOL 183 07/11/2024 10:39 AM    HDL 76 07/11/2024 10:39 AM    LDL 84.6 07/11/2024 10:39 AM    LDL 97.2 01/09/2024 11:21 AM    VLDL 22.4 07/11/2024 10:39 AM     No results found for: \"VITD3\"    Lab Results   Component Value Date/Time    TSH 3.08 06/30/2023 11:09 AM

## 2025-04-11 LAB
ANION GAP SERPL CALC-SCNC: 7 MMOL/L (ref 2–12)
BUN SERPL-MCNC: 36 MG/DL (ref 6–20)
BUN/CREAT SERPL: 15 (ref 12–20)
CALCIUM SERPL-MCNC: 9.6 MG/DL (ref 8.5–10.1)
CHLORIDE SERPL-SCNC: 104 MMOL/L (ref 97–108)
CO2 SERPL-SCNC: 25 MMOL/L (ref 21–32)
CREAT SERPL-MCNC: 2.42 MG/DL (ref 0.55–1.02)
GLUCOSE SERPL-MCNC: 100 MG/DL (ref 65–100)
POTASSIUM SERPL-SCNC: 4.2 MMOL/L (ref 3.5–5.1)
SODIUM SERPL-SCNC: 136 MMOL/L (ref 136–145)

## 2025-04-14 LAB
KAPPA LC FREE SER-MCNC: 110.6 MG/L (ref 3.3–19.4)
KAPPA LC FREE/LAMBDA FREE SER: 2.23 (ref 0.26–1.65)
LAMBDA LC FREE SERPL-MCNC: 49.5 MG/L (ref 5.7–26.3)

## 2025-04-25 NOTE — TELEPHONE ENCOUNTER
PCP: Pérez Colindres MD    Last appt: 1/30/2025   Future Appointments   Date Time Provider Department Center   7/31/2025 10:30 AM Pérez Colindres MD [facility]       Requested Prescriptions     Pending Prescriptions Disp Refills    beclomethasone (QVAR REDIHALER) 80 MCG/ACT AERB inhaler 3 each 0     Sig: Inhale 1 puff into the lungs daily As needed

## 2025-04-25 NOTE — TELEPHONE ENCOUNTER
beclomethasone (QVAR REDIHALER) 80 MCG/ACT AERB inhaler    Pt is having wheezing and allergies and really needs this inhaler today if possible.     Refill to  [facility] #[phone]

## 2025-04-30 RX ORDER — FLUTICASONE FUROATE 100 UG/1
1 POWDER RESPIRATORY (INHALATION) DAILY
Qty: 30 EACH | Refills: 11 | Status: SHIPPED | OUTPATIENT
Start: 2025-04-30

## 2025-05-02 ENCOUNTER — TELEPHONE (OUTPATIENT)
Age: 80
End: 2025-05-02

## 2025-05-02 NOTE — TELEPHONE ENCOUNTER
Attempted to reach patient. Vm full. Unable to leave message.    Insurance company no longer covers Qvar inhaler. Dr. Colindres prescribed alternative Arunity Ellipta for wheezing.

## 2025-05-02 NOTE — TELEPHONE ENCOUNTER
Beclomethasone (QVAR REDIHALER)     Pt had requested this inhaler and something else was sent.  Why?    Pt is having wheezing and needs this inhaler today.  She did not  the Ellipta.    Please send to [facility] #[phone]

## 2025-05-12 ENCOUNTER — TELEPHONE (OUTPATIENT)
Age: 80
End: 2025-05-12

## 2025-05-12 NOTE — TELEPHONE ENCOUNTER
[facility] pharmacy calling about   beclomethasone (QVAR REDIHALER) 80 MCG/ACT AERB inhaler [[id number]]  DISCONTINUED       Patient is at pharmacy and states needs the prescription to be filled but pharmacy is stating the pcp closed prescription on 4/25/25.  Call transferred to nurse

## 2025-05-12 NOTE — TELEPHONE ENCOUNTER
Spoke with [facility] pharmacy.   See telephone encounter 5/2/2025.  Insurance no longer cover Qvar. Alternative sent to rx.  Pharmacy rep will relay message to pt.

## 2025-05-14 ENCOUNTER — APPOINTMENT (OUTPATIENT)
Facility: HOSPITAL | Age: 80
End: 2025-05-14
Payer: MEDICARE

## 2025-05-14 ENCOUNTER — HOSPITAL ENCOUNTER (EMERGENCY)
Facility: HOSPITAL | Age: 80
Discharge: HOME | End: 2025-05-14
Attending: STUDENT IN AN ORGANIZED HEALTH CARE EDUCATION/TRAINING PROGRAM
Payer: MEDICARE

## 2025-05-14 ENCOUNTER — OFFICE VISIT (OUTPATIENT)
Age: 80
End: 2025-05-14
Payer: MEDICARE

## 2025-05-14 ENCOUNTER — TELEPHONE (OUTPATIENT)
Age: 80
End: 2025-05-14

## 2025-05-14 VITALS
WEIGHT: 161.38 LBS | OXYGEN SATURATION: 98 % | DIASTOLIC BLOOD PRESSURE: 106 MMHG | BODY MASS INDEX: 25.33 KG/M2 | HEIGHT: 67 IN | RESPIRATION RATE: 20 BRPM | TEMPERATURE: 97.7 F | HEART RATE: 86 BPM | SYSTOLIC BLOOD PRESSURE: 222 MMHG

## 2025-05-14 VITALS
TEMPERATURE: 97.8 F | RESPIRATION RATE: 20 BRPM | WEIGHT: 162.4 LBS | OXYGEN SATURATION: 98 % | SYSTOLIC BLOOD PRESSURE: 158 MMHG | DIASTOLIC BLOOD PRESSURE: 76 MMHG | HEIGHT: 67 IN | BODY MASS INDEX: 25.49 KG/M2 | HEART RATE: 73 BPM

## 2025-05-14 DIAGNOSIS — R06.09 DOE (DYSPNEA ON EXERTION): Primary | ICD-10-CM

## 2025-05-14 DIAGNOSIS — R06.00 DYSPNEA, UNSPECIFIED TYPE: Primary | ICD-10-CM

## 2025-05-14 DIAGNOSIS — R06.01 ORTHOPNEA: ICD-10-CM

## 2025-05-14 DIAGNOSIS — Z95.0 S/P PLACEMENT OF CARDIAC PACEMAKER: ICD-10-CM

## 2025-05-14 DIAGNOSIS — I50.9 ACUTE ON CHRONIC CONGESTIVE HEART FAILURE, UNSPECIFIED HEART FAILURE TYPE (HCC): ICD-10-CM

## 2025-05-14 LAB
ANION GAP SERPL CALC-SCNC: 8 MMOL/L (ref 2–12)
BASOPHILS # BLD: 0.03 K/UL (ref 0–0.1)
BASOPHILS NFR BLD: 0.5 % (ref 0–1)
BUN SERPL-MCNC: 25 MG/DL (ref 6–20)
BUN/CREAT SERPL: 13 (ref 12–20)
CALCIUM SERPL-MCNC: 9 MG/DL (ref 8.5–10.1)
CHLORIDE SERPL-SCNC: 104 MMOL/L (ref 97–108)
CO2 SERPL-SCNC: 22 MMOL/L (ref 21–32)
COMMENT:: NORMAL
CREAT SERPL-MCNC: 1.88 MG/DL (ref 0.55–1.02)
DIFFERENTIAL METHOD BLD: ABNORMAL
EKG ATRIAL RATE: 88 BPM
EKG DIAGNOSIS: NORMAL
EKG P AXIS: 73 DEGREES
EKG P-R INTERVAL: 216 MS
EKG Q-T INTERVAL: 430 MS
EKG QRS DURATION: 124 MS
EKG QTC CALCULATION (BAZETT): 520 MS
EKG R AXIS: 4 DEGREES
EKG T AXIS: 222 DEGREES
EKG VENTRICULAR RATE: 88 BPM
EOSINOPHIL # BLD: 0.04 K/UL (ref 0–0.4)
EOSINOPHIL NFR BLD: 0.6 % (ref 0–7)
ERYTHROCYTE [DISTWIDTH] IN BLOOD BY AUTOMATED COUNT: 13.2 % (ref 11.5–14.5)
FLUAV RNA SPEC QL NAA+PROBE: NOT DETECTED
FLUBV RNA SPEC QL NAA+PROBE: NOT DETECTED
GLUCOSE SERPL-MCNC: 118 MG/DL (ref 65–100)
HCT VFR BLD AUTO: 31.1 % (ref 35–47)
HGB BLD-MCNC: 10 G/DL (ref 11.5–16)
IMM GRANULOCYTES # BLD AUTO: 0.03 K/UL (ref 0–0.04)
IMM GRANULOCYTES NFR BLD AUTO: 0.5 % (ref 0–0.5)
LYMPHOCYTES # BLD: 1.32 K/UL (ref 0.8–3.5)
LYMPHOCYTES NFR BLD: 20.3 % (ref 12–49)
MCH RBC QN AUTO: 30.2 PG (ref 26–34)
MCHC RBC AUTO-ENTMCNC: 32.2 G/DL (ref 30–36.5)
MCV RBC AUTO: 94 FL (ref 80–99)
MONOCYTES # BLD: 0.49 K/UL (ref 0–1)
MONOCYTES NFR BLD: 7.6 % (ref 5–13)
NEUTS SEG # BLD: 4.59 K/UL (ref 1.8–8)
NEUTS SEG NFR BLD: 70.5 % (ref 32–75)
NRBC # BLD: 0 K/UL (ref 0–0.01)
NRBC BLD-RTO: 0 PER 100 WBC
NT PRO BNP: ABNORMAL PG/ML
PLATELET # BLD AUTO: 238 K/UL (ref 150–400)
POTASSIUM SERPL-SCNC: 4.2 MMOL/L (ref 3.5–5.1)
RBC # BLD AUTO: 3.31 M/UL (ref 3.8–5.2)
RBC MORPH BLD: ABNORMAL
SARS-COV-2 RNA RESP QL NAA+PROBE: NOT DETECTED
SODIUM SERPL-SCNC: 134 MMOL/L (ref 136–145)
SOURCE: NORMAL
SPECIMEN HOLD: NORMAL
TROPONIN I SERPL HS-MCNC: 34 NG/L (ref 0–51)
TROPONIN I SERPL HS-MCNC: 39 NG/L (ref 0–51)
WBC # BLD AUTO: 6.5 K/UL (ref 3.6–11)

## 2025-05-14 PROCEDURE — 71045 X-RAY EXAM CHEST 1 VIEW: CPT

## 2025-05-14 PROCEDURE — 85025 COMPLETE CBC W/AUTO DIFF WBC: CPT

## 2025-05-14 PROCEDURE — 93000 ELECTROCARDIOGRAM COMPLETE: CPT | Performed by: INTERNAL MEDICINE

## 2025-05-14 PROCEDURE — 83880 ASSAY OF NATRIURETIC PEPTIDE: CPT

## 2025-05-14 PROCEDURE — 74018 RADEX ABDOMEN 1 VIEW: CPT

## 2025-05-14 PROCEDURE — 6360000002 HC RX W HCPCS: Performed by: STUDENT IN AN ORGANIZED HEALTH CARE EDUCATION/TRAINING PROGRAM

## 2025-05-14 PROCEDURE — 93005 ELECTROCARDIOGRAM TRACING: CPT

## 2025-05-14 PROCEDURE — 87636 SARSCOV2 & INF A&B AMP PRB: CPT

## 2025-05-14 PROCEDURE — 6370000000 HC RX 637 (ALT 250 FOR IP): Performed by: STUDENT IN AN ORGANIZED HEALTH CARE EDUCATION/TRAINING PROGRAM

## 2025-05-14 PROCEDURE — 84484 ASSAY OF TROPONIN QUANT: CPT

## 2025-05-14 PROCEDURE — 36415 COLL VENOUS BLD VENIPUNCTURE: CPT

## 2025-05-14 PROCEDURE — 96374 THER/PROPH/DIAG INJ IV PUSH: CPT

## 2025-05-14 PROCEDURE — 99285 EMERGENCY DEPT VISIT HI MDM: CPT

## 2025-05-14 PROCEDURE — 80048 BASIC METABOLIC PNL TOTAL CA: CPT

## 2025-05-14 RX ORDER — HYDRALAZINE HYDROCHLORIDE 50 MG/1
25 TABLET, FILM COATED ORAL
Status: COMPLETED | OUTPATIENT
Start: 2025-05-14 | End: 2025-05-14

## 2025-05-14 RX ORDER — NIFEDIPINE 30 MG
30 TABLET, EXTENDED RELEASE ORAL DAILY
COMMUNITY
Start: 2025-04-24

## 2025-05-14 RX ORDER — FUROSEMIDE 10 MG/ML
60 INJECTION INTRAMUSCULAR; INTRAVENOUS
Status: COMPLETED | OUTPATIENT
Start: 2025-05-14 | End: 2025-05-14

## 2025-05-14 RX ORDER — NIFEDIPINE 30 MG/1
30 TABLET, EXTENDED RELEASE ORAL
Status: COMPLETED | OUTPATIENT
Start: 2025-05-14 | End: 2025-05-14

## 2025-05-14 RX ADMIN — FUROSEMIDE 60 MG: 10 INJECTION, SOLUTION INTRAMUSCULAR; INTRAVENOUS at 14:58

## 2025-05-14 RX ADMIN — HYDRALAZINE HYDROCHLORIDE 25 MG: 50 TABLET ORAL at 19:17

## 2025-05-14 RX ADMIN — NIFEDIPINE 30 MG: 30 TABLET, FILM COATED, EXTENDED RELEASE ORAL at 15:24

## 2025-05-14 NOTE — TELEPHONE ENCOUNTER
Pt is having wheezing yet after the inhaler.    Pt is not feeling herself/feeling good.     Pt would like to get in sooner than 6-2-25 as that was the first.  Pt wants to see Dr. Colindres      Please call to schedule. Thanks.

## 2025-05-14 NOTE — ED PROVIDER NOTES
[facility] EMERGENCY DEPT  EMERGENCY DEPARTMENT HISTORY AND PHYSICAL EXAM      Date of evaluation: 5/14/2025  Patient Name: Faye Johnson  Birthdate 1945  MRN: [id number]  ED Provider: Philip Mendze MD   Note Started: 2:17 PM EDT 5/14/25    HISTORY OF PRESENT ILLNESS     Chief Complaint   Patient presents with    Shortness of Breath     Pt ambulatory to triage with a cane for c/o SOB, reports she has not been able to get an inhaler that she needs. Pts PCP sent pt to the ER this morning.        History Provided By: Patient    HPI: Faye Johnson is a 80 y.o. female PMH as below including HTN, patient unclear if she has a history of heart failure reports she does take Lasix every day, previous echo reviewed from 2021 shows EF 60 to 65%, presenting with worsening shortness of breath over the last several days.  She denies any chest pain or fevers with the symptoms.  She reports recent change in her inhaler, but correction to nursing note, she does have the correct at [facility] at home currently prescription received this week.    PAST MEDICAL HISTORY   Past Medical History:  Past Medical History:   Diagnosis Date    Arthritis     hands    Asthma     CKD (chronic kidney disease) stage 3, GFR 30-59 ml/min ([facility]) 08/13/2019    Essential hypertension     GERD (gastroesophageal reflux disease)     High cholesterol     HTN (hypertension)     Ill-defined condition     hyperlipidemia    Left ventricular hypertrophy due to hypertensive disease     Murmur     Renal artery stenosis     Shingles 08/11/2018    Syncope        Past Surgical History:  Past Surgical History:   Procedure Laterality Date    BREAST BIOPSY Left 20 years ago    negative    CARDIAC CATHETERIZATION  2000    normal    CATARACT REMOVAL  07/2018    right eye    EP DEVICE PROCEDURE N/A 12/16/2024    Insert PPM dual performed by Sarath Perez MD at [facility] CARDIAC CATH LAB    GI      6/29/18:  pt reports mulitple abdominal surgeries but unable to recall exact

## 2025-05-14 NOTE — DISCHARGE INSTRUCTIONS
You have an elevation of the CHF enzyme indicating likely difficulty pumping of the heart and fluid backup.  There is no sign of fluid on the lungs from this which is good news.  You do have a unrelated trace pleural effusion which is very small.  You are given Lasix in the ER please continue to take your Lasix at home recheck your blood pressure at home and follow-up with your PCP.  Blood pressure is often higher in the ER than baseline        Thank you for choosing our Emergency Department for your care.  It is our privilege to care for you in your time of need.  In the next several days, you may receive a survey via email or mailed to your home about your experience with our team.  We would greatly appreciate you taking a few minutes to complete the survey, as we use this information to learn what we have done well and what we could be doing better. Thank you for trusting us with your care!    Below you will find a list of your tests from today's visit.   Labs and Radiology Studies  Recent Results (from the past 12 hours)   EKG 12 Lead (SOB)    Collection Time: 05/14/25 12:29 PM   Result Value Ref Range    Ventricular Rate 88 BPM    Atrial Rate 88 BPM    P-R Interval 216 ms    QRS Duration 124 ms    Q-T Interval 430 ms    QTc Calculation (Bazett) 520 ms    P Axis 73 degrees    R Axis 4 degrees    T Axis 222 degrees    Diagnosis       Sinus rhythm with marked sinus arrhythmia with 1st degree AV block with   frequent ventricular-paced complexes  Nonspecific intraventricular conduction delay  ST & T wave abnormality, consider inferior ischemia  ST & T wave abnormality, consider anterolateral ischemia  When compared with ECG of 08-DEC-2022 17:06,  Electronic ventricular pacemaker has replaced Sinus rhythm  New TWI   Confirmed by Philip Mendez MD ([id number]) on 5/14/2025 1:38:34 PM     BMP    Collection Time: 05/14/25 12:45 PM   Result Value Ref Range    Sodium 134 (L) 136 - 145 mmol/L    Potassium 4.2 3.5 - 5.1

## 2025-05-14 NOTE — PROGRESS NOTES
Have you been to the ER, urgent care clinic since your last visit?  Hospitalized since your last visit?   NO    Have you seen or consulted any other health care providers outside our system since your last visit?   Romina lai

## 2025-05-14 NOTE — PROGRESS NOTES
HISTORY OF PRESENT ILLNESS   Faye Johnson   is a 80 y.o.  female.  Hx HTN , s/p bl renal artery stents for YARELI HLD postherpetic neuralgia. leg edema ckd 3 low vit d, mild right carotid carotid artery stenosis, osteopenia with low FRAX  hx pagets dz SSS s/p PM     C/o wheezes at night  for 1 month    Increased WILSON last 1-2 weeks. No cp   Some increased leg edema  Weight up 3 lbs  Having to sit up to get her breath last few days  Unable to lay flat due to sob supine    She reports had echo a few months ago      Last OV  Nephro Dr Vanegas-Dr 1.7-2.0 range. Nifedidine dose lowered to 30 mg qd. . Remote bp monitoring with Dr Vanegas. Labs q 4 months  VASC Dr Kowalski--has yearly  fu stents  EP- Dr Tobar sp PPM 12/16/2024 for PVC , SSS , Sinus pause--feels better now  Home BP/RPM via Dr Ríos office        Not eating much, appetite decreased. No pain or nausea  Weight down gradually  Never a smoker  Lives alone but children and grandchildren visit  Patient Active Problem List    Diagnosis Date Noted    SSS (sick sinus syndrome) ([facility]) 12/16/2024    Essential hypertension, malignant 04/26/2015    CKD (chronic kidney disease) stage 3, GFR 30-59 ml/min ([facility]) 08/13/2019    Early satiety 08/13/2019    Osteopenia of hip 04/22/2019    Vitamin D deficiency 04/12/2019    Syncope and collapse 09/30/2018    Syncope due to orthostatic hypotension 09/30/2018    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    Bilateral carotid artery stenosis 12/05/2017    Carotid bruit present 12/05/2017    History of tubal ligation 09/14/2015    Bilateral renal artery stenosis 08/09/2015    Stress at home 06/13/2015    High cholesterol     Left ventricular hypertrophy due to hypertensive disease     H/O gastroesophageal reflux (GERD) 03/12/2015    Snores 09/04/2013    Obesity 09/04/2013    Edema of both legs 09/04/2013     Current Outpatient Medications   Medication Sig Dispense Refill    NIFEdipine (ADALAT CC) 30 MG extended release tablet Take 1 tablet by

## 2025-05-15 DIAGNOSIS — R06.09 DOE (DYSPNEA ON EXERTION): ICD-10-CM

## 2025-05-15 DIAGNOSIS — R94.31 ABNORMAL EKG: Primary | ICD-10-CM

## 2025-05-15 NOTE — ED NOTES
Patient stable GCS15  Blood pressure is high but no chest pain   Medication given   IV cannula removed   Discharged summary given and understood

## 2025-06-14 LAB
ALBUMIN SERPL-MCNC: 3.6 G/DL (ref 3.5–5)
ALBUMIN/GLOB SERPL: 1 (ref 1.1–2.2)
ALP SERPL-CCNC: 368 U/L (ref 45–117)
ALT SERPL-CCNC: 15 U/L (ref 12–78)
ANION GAP SERPL CALC-SCNC: 7 MMOL/L (ref 2–12)
AST SERPL-CCNC: 25 U/L (ref 15–37)
BILIRUB SERPL-MCNC: 0.5 MG/DL (ref 0.2–1)
BUN SERPL-MCNC: 39 MG/DL (ref 6–20)
BUN/CREAT SERPL: 18 (ref 12–20)
CALCIUM SERPL-MCNC: 9.4 MG/DL (ref 8.5–10.1)
CHLORIDE SERPL-SCNC: 103 MMOL/L (ref 97–108)
CO2 SERPL-SCNC: 27 MMOL/L (ref 21–32)
CREAT SERPL-MCNC: 2.19 MG/DL (ref 0.55–1.02)
GLOBULIN SER CALC-MCNC: 3.6 G/DL (ref 2–4)
GLUCOSE SERPL-MCNC: 102 MG/DL (ref 65–100)
NT PRO BNP: ABNORMAL PG/ML
POTASSIUM SERPL-SCNC: 4.3 MMOL/L (ref 3.5–5.1)
PROT SERPL-MCNC: 7.2 G/DL (ref 6.4–8.2)
SODIUM SERPL-SCNC: 137 MMOL/L (ref 136–145)

## 2025-07-29 PROBLEM — I50.30 DIASTOLIC CHF (HCC): Status: ACTIVE | Noted: 2025-07-29

## 2025-07-30 NOTE — PROGRESS NOTES
HISTORY OF PRESENT ILLNESS   Faye Johnson   is a 80 y.o.  female.  fu HTN , diastolic CHF s/p bl renal artery stents for YARELI HLD postherpetic neuralgia. leg edema ckd 3 low vit d, mild right carotid carotid artery stenosis, osteopenia with low FRAX  hx pagets dz SSS s/p PM      Nephro Dr Vanegas-slowly progressive ckd 4  VASC Dr Kowalski b/l renal srt stents  EP Dr Perez  Sent to ER last OV for dyspnea and abnl EKG--bnp 23,000 and small right effusion.d/cf from ER with acute on chronic CHF with outp fu    SHIRA Rogel -diastolic chf--lasix increased to 40 mg qd then lowered to 20 mg qd . Lexapro started by CARD for anxiety and depression----she did not take the medication feels ok now--concerned about health issues in the family-worried   Less WILSON now       Last LDL 84  Nephro--last cr 2.19    Last OV  C/o wheezes at night  for 1 month     Increased WILSON last 1-2 weeks. No cp   Some increased leg edema  Weight up 3 lbs  Having to sit up to get her breath last few days  Unable to lay flat due to sob supine     She reports had echo a few months   Patient Active Problem List    Diagnosis Date Noted    SSS (sick sinus syndrome) (Formerly [facility]) 12/16/2024    Essential hypertension, malignant 04/26/2015    CKD (chronic kidney disease) stage 3, GFR 30-59 ml/min ([facility]) 08/13/2019    Early satiety 08/13/2019    Osteopenia of hip 04/22/2019    Vitamin D deficiency 04/12/2019    Syncope and collapse 09/30/2018    Syncope due to orthostatic hypotension 09/30/2018    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    Bilateral carotid artery stenosis 12/05/2017    Carotid bruit present 12/05/2017    History of tubal ligation 09/14/2015    Bilateral renal artery stenosis 08/09/2015    Stress at home 06/13/2015    High cholesterol     Left ventricular hypertrophy due to hypertensive disease     H/O gastroesophageal reflux (GERD) 03/12/2015    Snores 09/04/2013    Obesity 09/04/2013    Edema of both legs 09/04/2013     Current Outpatient Medications

## 2025-07-31 ENCOUNTER — OFFICE VISIT (OUTPATIENT)
Age: 80
End: 2025-07-31
Payer: MEDICARE

## 2025-07-31 VITALS
TEMPERATURE: 97.4 F | WEIGHT: 146.6 LBS | BODY MASS INDEX: 23.01 KG/M2 | HEIGHT: 67 IN | RESPIRATION RATE: 16 BRPM | DIASTOLIC BLOOD PRESSURE: 82 MMHG | HEART RATE: 68 BPM | OXYGEN SATURATION: 100 % | SYSTOLIC BLOOD PRESSURE: 122 MMHG

## 2025-07-31 DIAGNOSIS — I10 HYPERTENSION, UNSPECIFIED TYPE: Primary | ICD-10-CM

## 2025-07-31 DIAGNOSIS — N18.4 CKD (CHRONIC KIDNEY DISEASE) STAGE 4, GFR 15-29 ML/MIN (HCC): ICD-10-CM

## 2025-07-31 DIAGNOSIS — I50.30 DIASTOLIC CONGESTIVE HEART FAILURE, UNSPECIFIED HF CHRONICITY (HCC): ICD-10-CM

## 2025-07-31 DIAGNOSIS — I77.1 SUBCLAVIAN ARTERIAL STENOSIS: ICD-10-CM

## 2025-07-31 DIAGNOSIS — E78.5 HYPERLIPIDEMIA, UNSPECIFIED HYPERLIPIDEMIA TYPE: ICD-10-CM

## 2025-07-31 LAB
CHOLEST SERPL-MCNC: 174 MG/DL (ref 0–200)
HDLC SERPL-MCNC: 73 MG/DL (ref 40–60)
HDLC SERPL: 2.4
LDLC SERPL CALC-MCNC: 88 MG/DL
TRIGL SERPL-MCNC: 68 MG/DL (ref 0–150)
VLDLC SERPL CALC-MCNC: 14 MG/DL

## 2025-07-31 PROCEDURE — 99214 OFFICE O/P EST MOD 30 MIN: CPT | Performed by: INTERNAL MEDICINE

## 2025-07-31 PROCEDURE — G8399 PT W/DXA RESULTS DOCUMENT: HCPCS | Performed by: INTERNAL MEDICINE

## 2025-07-31 PROCEDURE — 1159F MED LIST DOCD IN RCRD: CPT | Performed by: INTERNAL MEDICINE

## 2025-07-31 PROCEDURE — G8420 CALC BMI NORM PARAMETERS: HCPCS | Performed by: INTERNAL MEDICINE

## 2025-07-31 PROCEDURE — 1123F ACP DISCUSS/DSCN MKR DOCD: CPT | Performed by: INTERNAL MEDICINE

## 2025-07-31 PROCEDURE — 1090F PRES/ABSN URINE INCON ASSESS: CPT | Performed by: INTERNAL MEDICINE

## 2025-07-31 PROCEDURE — 3079F DIAST BP 80-89 MM HG: CPT | Performed by: INTERNAL MEDICINE

## 2025-07-31 PROCEDURE — 3074F SYST BP LT 130 MM HG: CPT | Performed by: INTERNAL MEDICINE

## 2025-07-31 PROCEDURE — 1036F TOBACCO NON-USER: CPT | Performed by: INTERNAL MEDICINE

## 2025-07-31 PROCEDURE — G8427 DOCREV CUR MEDS BY ELIG CLIN: HCPCS | Performed by: INTERNAL MEDICINE

## 2025-07-31 RX ORDER — NIFEDIPINE 30 MG
30 TABLET, EXTENDED RELEASE ORAL DAILY
Qty: 90 TABLET | Refills: 3 | Status: SHIPPED | OUTPATIENT
Start: 2025-07-31

## 2025-07-31 SDOH — ECONOMIC STABILITY: FOOD INSECURITY: WITHIN THE PAST 12 MONTHS, THE FOOD YOU BOUGHT JUST DIDN'T LAST AND YOU DIDN'T HAVE MONEY TO GET MORE.: NEVER TRUE

## 2025-07-31 SDOH — ECONOMIC STABILITY: FOOD INSECURITY: WITHIN THE PAST 12 MONTHS, YOU WORRIED THAT YOUR FOOD WOULD RUN OUT BEFORE YOU GOT MONEY TO BUY MORE.: NEVER TRUE

## 2025-07-31 ASSESSMENT — PATIENT HEALTH QUESTIONNAIRE - PHQ9
SUM OF ALL RESPONSES TO PHQ QUESTIONS 1-9: 0
SUM OF ALL RESPONSES TO PHQ QUESTIONS 1-9: 0
2. FEELING DOWN, DEPRESSED OR HOPELESS: NOT AT ALL
SUM OF ALL RESPONSES TO PHQ QUESTIONS 1-9: 0
1. LITTLE INTEREST OR PLEASURE IN DOING THINGS: NOT AT ALL
SUM OF ALL RESPONSES TO PHQ QUESTIONS 1-9: 0

## 2025-07-31 NOTE — PROGRESS NOTES
Have you been to the ER, urgent care clinic since your last visit?  Hospitalized since your last visit?   Yes// 05/14/2025    Have you seen or consulted any other health care providers outside our system since your last visit?   NO

## (undated) DEVICE — SOLUTION IV STRL H2O 500 ML AQUALITE POUR BTL

## (undated) DEVICE — KENDALL DL ECG CABLE AND LEAD WIRE SYSTEM, 3-LEAD, SINGLE PATIENT USE: Brand: KENDALL

## (undated) DEVICE — LIMB HOLDER, WRIST/ANKLE: Brand: DEROYAL

## (undated) DEVICE — KIT INTRO 9FR L13CM DIA0.118IN SPLITTABLE HEMSTAT ROBUST

## (undated) DEVICE — SYRINGE TB 1ML TRNSLUC BRL WHT PLUNG BLK MRK CONVENTIONAL

## (undated) DEVICE — 3M™ IOBAN™ 2 ANTIMICROBIAL INCISE DRAPE 6650EZ: Brand: IOBAN™ 2

## (undated) DEVICE — THE MONARCH® "D" CARTRIDGE IS A SINGLE-USE POLYPROPYLENE CARTRIDGE FOR POSTERIOR CHAMBER IOL DELIVERY: Brand: MONARCH® III

## (undated) DEVICE — SUTURE ABSORBABLE WND CLOSURE 4-0 P-12 12 IN UD V-LOC

## (undated) DEVICE — ELECTRODE PT RET AD L9FT HI MOIST COND ADH HYDRGEL CORDED

## (undated) DEVICE — SOLUTION IV 250ML 0.9% SOD CHL CLR INJ FLX BG CONT PRT CLSR

## (undated) DEVICE — KIT ACCS INTRO 4FR L10CM NDL 21GA L7CM GWIRE L40CM

## (undated) DEVICE — SURGICAL PROCEDURE PACK CATRCT CUST

## (undated) DEVICE — HIGH FLOW RATE EXTENSION SET, LUER LOCK ADAPTERS

## (undated) DEVICE — INTRODUCER SHTH L13CM OD7FR SH ORNG HUB SEAMLESS SAFSHTH

## (undated) DEVICE — STERILE POLYISOPRENE POWDER-FREE SURGICAL GLOVES: Brand: PROTEXIS

## (undated) DEVICE — PACEMAKER PACK: Brand: MEDLINE INDUSTRIES, INC.

## (undated) DEVICE — TRAY,IRRIGATION,PISTON SYRINGE,60ML,STRL: Brand: MEDLINE

## (undated) DEVICE — 3M™ TEGADERM™ TRANSPARENT FILM DRESSING FRAME STYLE, 1624W, 2-3/8 IN X 2-3/4 IN (6 CM X 7 CM), 100/CT 4CT/CASE: Brand: 3M™ TEGADERM™

## (undated) DEVICE — SYR 3ML LL TIP 1/10ML GRAD --

## (undated) DEVICE — SUTURE PERMAHAND SZ 0 L30IN NONABSORBABLE BLK L26MM SH 1/2 K834H

## (undated) DEVICE — SPONGE GZ W4XL4IN COT RADPQ HIGHLY ABSRB STERILE

## (undated) DEVICE — TOWEL SURG W17XL27IN STD BLU COT NONFENESTRATED PREWASHED

## (undated) DEVICE — NDL FLTR TIP 5 MIC 18GX1.5IN --

## (undated) DEVICE — SUTURE V-LOC 180 SZ 2-0 L12IN ABSRB VLT GS-21 L37MM 1/2 CIR VLOCM0315

## (undated) DEVICE — MEDI-TRACE CADENCE ADULT, DEFIBRILLATION ELECTRODE -RTS  (10 PR/PK) - PHYSIO-CONTROL: Brand: MEDI-TRACE CADENCE